# Patient Record
Sex: MALE | Race: WHITE | Employment: OTHER | URBAN - METROPOLITAN AREA
[De-identification: names, ages, dates, MRNs, and addresses within clinical notes are randomized per-mention and may not be internally consistent; named-entity substitution may affect disease eponyms.]

---

## 2017-06-23 ENCOUNTER — HOSPITAL ENCOUNTER (EMERGENCY)
Facility: CLINIC | Age: 77
Discharge: HOME | End: 2017-06-24
Attending: EMERGENCY MEDICINE | Admitting: EMERGENCY MEDICINE
Payer: MEDICARE

## 2017-06-23 DIAGNOSIS — R33.9 URINARY RETENTION: ICD-10-CM

## 2017-06-23 LAB
ALBUMIN UR-MCNC: 30 MG/DL
APPEARANCE UR: ABNORMAL
BACTERIA #/AREA URNS HPF: ABNORMAL /HPF
BILIRUB UR QL STRIP: NEGATIVE
COLOR UR AUTO: YELLOW
GLUCOSE UR STRIP-MCNC: NEGATIVE MG/DL
HGB UR QL STRIP: ABNORMAL
HYALINE CASTS #/AREA URNS LPF: 1 /LPF (ref 0–2)
KETONES UR STRIP-MCNC: NEGATIVE MG/DL
LEUKOCYTE ESTERASE UR QL STRIP: ABNORMAL
MUCOUS THREADS #/AREA URNS LPF: PRESENT /LPF
NITRATE UR QL: POSITIVE
PH UR STRIP: 5 PH (ref 5–7)
RBC #/AREA URNS AUTO: >182 /HPF (ref 0–2)
SP GR UR STRIP: 1.01 (ref 1–1.03)
URN SPEC COLLECT METH UR: ABNORMAL
UROBILINOGEN UR STRIP-MCNC: NORMAL MG/DL (ref 0–2)
WBC #/AREA URNS AUTO: 32 /HPF (ref 0–2)

## 2017-06-23 PROCEDURE — 87086 URINE CULTURE/COLONY COUNT: CPT | Performed by: EMERGENCY MEDICINE

## 2017-06-23 PROCEDURE — 81001 URINALYSIS AUTO W/SCOPE: CPT | Performed by: EMERGENCY MEDICINE

## 2017-06-23 PROCEDURE — 99283 EMERGENCY DEPT VISIT LOW MDM: CPT | Mod: 25

## 2017-06-23 PROCEDURE — 87088 URINE BACTERIA CULTURE: CPT | Performed by: EMERGENCY MEDICINE

## 2017-06-23 PROCEDURE — 51702 INSERT TEMP BLADDER CATH: CPT

## 2017-06-23 PROCEDURE — 87186 SC STD MICRODIL/AGAR DIL: CPT | Performed by: EMERGENCY MEDICINE

## 2017-06-23 NOTE — ED AVS SNAPSHOT
Emergency Department    [address] [facility] [address]    Phone:  [phone]    Fax:  [phone]                                       Chavez Mccain   MRN: [id number]    Department:   Emergency Department   Date of Visit:  6/23/2017           After Visit Summary Signature Page     I have received my discharge instructions, and my questions have been answered. I have discussed any challenges I see with this plan with the nurse or doctor.    ..........................................................................................................................................  Patient/Patient Representative Signature      ..........................................................................................................................................  Patient Representative Print Name and Relationship to Patient    ..................................................               ................................................  Date                                            Time    ..........................................................................................................................................  Reviewed by Signature/Title    ...................................................              ..............................................  Date                                                            Time

## 2017-06-23 NOTE — ED AVS SNAPSHOT
Emergency Department    [address] [facility] [address]    Phone:  [phone]    Fax:  [phone]                                       Chavez Mccain   MRN: [id number]    Department:   Emergency Department   Date of Visit:  6/23/2017           Patient Information     Date Of Birth          1940        Your diagnoses for this visit were:     Urinary retention        You were seen by Carmine Tsai MD.      Follow-up Information     Follow up with Vasile Brooks MD. Schedule an appointment as soon as possible for a visit in 1 week.    Specialty:  Internal Medicine    Contact information:    [facility]  [address] [place] [address] [phone]          Follow up with  Emergency Department.    Specialty:  EMERGENCY MEDICINE    Why:  If symptoms worsen    Contact information:    [address] [facility] [id number] [phone]        Discharge Instructions         Sequeira Catheter Care    A Sequeira catheter is a rubber tube that is placed through the urethra (opening where urine comes out) and into the bladder. This helps drain urine from the bladder. There is a small balloon on the end of the tube that is inflated after insertion. This keeps the catheter from sliding out of the bladder.  A Sequeira catheter is used to treat urinary retention (unable to pass urine). It is also used when there is incontinence (loss of bladder control).  Home care    Finish taking any prescribed antibiotic even if you are feeling better before then.    It is important to keep bacteria from getting into the collection bag. Do not disconnect the catheter from the collection bag.    Use a leg band to secure the drainage tube, so it does not pull on the catheter. Drain the collection bag when it becomes full using the drain spout at the bottom of the bag.    Do not try to pull or remove your catheter. This will injure your urethra. It must be removed by  your healthcare provider or nurse.  Follow-up care  Follow up with your healthcare provider as advised for repeat urine testing and catheter removal or replacement.  When to seek medical advice  Call your healthcare provider right away if any of these occur:    Fever of 100.4 F (38 C) or higher, or as directed by your healthcare provider    Bladder pain or fullness    Abdominal swelling, nausea or vomiting, or back pain    Blood or urine leakage around the catheter    Bloody urine coming from the catheter (if a new symptom)    Catheter falls out    Catheter stops draining for 6 hours    Weakness, dizziness, or fainting  Date Last Reviewed: 10/1/2016    8056-4698 [employer]. [address] [place] [address]. All rights reserved. This information is not intended as a substitute for professional medical care. Always follow your healthcare professional's instructions.          Urinary Retention (Male)  Urinary retention is the medical term for difficulty or inability to pass urine, even though your bladder is full.  Causes  The most common cause of urinary retention in men is the bladder outlet being blocked. This can be due to an enlarged prostate gland or a bladder infection. Certain medicines can also cause this problem. This condition is more likely to occur as men get older.    This condition is treated by insertion of a catheter into the bladder to drain the urine. This provides immediate relief. The catheter may need to remain in place for a few days to prevent a recurrence. The catheter has a balloon on the tip which was inflated after insertion. This prevents the catheter from falling out.  Symptoms  Common symptoms of urinary retention include:    Pain (not experienced by everyone)    Frequent urination    Feeling that the bladder is still full after urinating    Incontinence (not being able to control the release of urine)    Swollen abdomen  Treatment  This condition is treated by  inserting a tube (catheter) into the bladder to drain the urine. This provides immediate relief. The catheter may need to stay in place for a few days. The catheter has a balloon on the tip, which is inflated after insertion. This prevents the catheter from falling out.  Home care    If you were given antibiotics, take them until they are used up, or your healthcare provider tells you to stop. It is important to finish the antibiotics even though you feel better. This is to make sure your infection has cleared.    If a catheter was left in place, it is important to keep bacteria from getting into the collection bag. Do not disconnect the catheter from the collection bag.    Use a leg band to secure the drainage tube, so it does not pull on the catheter. Drain the collection bag when it becomes full using the drain spout at the bottom of the bag.    Do not pull on or try to remove your catheter. This will injure your urethra. The catheter must be removed by a healthcare provider.  Follow-up care  Follow up with your healthcare provider, or as advised.  If a catheter was left in place, it can usually be removed within 3 to 7 days. Some conditions require the catheter to stay in longer. Your healthcare provider will tell you when to return to have the catheter removed.  When to seek medical advice  Call your healthcare provider right away if any of these occur:    Fever of 100.4 F (38 C) or higher, or as directed by your healthcare provider    Bladder or lower-abdominal pain or fullness    Abdominal swelling, nausea, vomiting, or back pain    Blood or urine leakage around the catheter    Bloody urine coming from the catheter (if a new symptom)    Weakness, dizziness, or fainting    Confusion or change in usual level of alertness    If a catheter was left in place, return if:    Catheter falls out    Catheter stops draining for 6 hours  Date Last Reviewed: 7/26/2015 2000-2017 [employer]. [address] [place] [address]  [place] [address]. All rights reserved. This information is not intended as a substitute for professional medical care. Always follow your healthcare professional's instructions.          24 Hour Appointment Hotline       To make an appointment at any [facility], call [phone] ([phone]). If you don't have a family doctor or clinic, we will help you find one. [facility] clinics are conveniently located to serve the needs of you and your family.             Review of your medicines      Our records show that you are taking the medicines listed below. If these are incorrect, please call your family doctor or clinic.        Dose / Directions Last dose taken    alfuzosin 10 MG 24 hr tablet   Commonly known as:  UROXATRAL   Dose:  10 mg        Take 10 mg by mouth daily.   Refills:  0        aspirin 81 MG EC tablet   Dose:  81 mg        Take 81 mg by mouth every evening   Refills:  0        atorvastatin 10 MG tablet   Commonly known as:  LIPITOR   Dose:  10 mg        Take 10 mg by mouth At Bedtime.   Refills:  0        cholecalciferol 1000 UNIT tablet   Commonly known as:  vitamin D   Dose:  1000 Units        Take 1,000 Units by mouth every evening   Refills:  0        cyanocobalamin 1000 MCG Tabs   Dose:  1000 mcg        Take 1,000 mcg by mouth every evening   Refills:  0        DONEPEZIL HCL PO   Dose:  10 mg        Take 10 mg by mouth daily   Refills:  0        GLIPIZIDE XL PO   Dose:  10 mg        Take 10 mg by mouth daily (with breakfast)   Refills:  0        insulin aspart 100 UNITS/ML injection   Commonly known as:  NovoLOG   Quantity:  10 mL        Give before meals and before bed: For Pre-Meal Glucose: 140-189 give 1 unit  190-239 give 2 units  240-289 give 3 units  290-339 give 4 units  = or >340 give 5 units   For Bedtime Glucose 200 - 239 give 1 units  240 - 289 give 1.5 units 290 - 339 give 2 units = or >340 give 2.5 units   Refills:  0        irbesartan 300 MG tablet   Commonly  known as:  AVAPRO   Dose:  300 mg        Take 300 mg by mouth daily.   Refills:  0        loperamide 2 MG capsule   Commonly known as:  IMODIUM   Dose:  2 mg        Take 2 mg by mouth 2 times daily   Refills:  0        metolazone 2.5 MG tablet   Commonly known as:  ZAROXOLYN   Dose:  2.5 mg        Take 2.5 mg by mouth daily.   Refills:  0        potassium chloride 10 MEQ CR tablet   Commonly known as:  KLOR-CON   Dose:  10 mEq        Take 10 mEq by mouth 4 times daily   Refills:  0        salsalate 750 MG tablet   Commonly known as:  DISALCID   Dose:  750 mg        Take 750 mg by mouth 3 times daily.   Refills:  0        traZODone 100 MG tablet   Commonly known as:  DESYREL   Dose:  100 mg        Take 100 mg by mouth At Bedtime.   Refills:  0        WELLBUTRIN XL PO   Dose:  300 mg        Take 300 mg by mouth daily   Refills:  0                Procedures and tests performed during your visit     UA with Microscopic      Orders Needing Specimen Collection     None      Pending Results     No orders found for last 3 day(s).            Pending Culture Results     No orders found for last 3 day(s).            Pending Results Instructions     If you had any lab results that were not finalized at the time of your Discharge, you can call the ED Lab Result RN at [phone]. You will be contacted by this team for any positive Lab results or changes in treatment. The nurses are available 7 days a week from 10A to 6:30P.  You can leave a message 24 hours per day and they will return your call.        Test Results From Your Hospital Stay        6/23/2017 11:41 PM      Component Results     Component Value Ref Range & Units Status    Color Urine Yellow  Final    Appearance Urine Slightly Cloudy  Final    Glucose Urine Negative NEG mg/dL Final    Bilirubin Urine Negative NEG Final    Ketones Urine Negative NEG mg/dL Final    Specific Gravity Urine 1.011 1.003 - 1.035 Final    Blood Urine Large (A) NEG Final    pH Urine 5.0 5.0  - 7.0 pH Final    Protein Albumin Urine 30 (A) NEG mg/dL Final    Urobilinogen mg/dL Normal 0.0 - 2.0 mg/dL Final    Nitrite Urine Positive (A) NEG Final    Leukocyte Esterase Urine Large (A) NEG Final    Source Midstream Urine  Final    WBC Urine 32 (H) 0 - 2 /HPF Final    RBC Urine >182 (H) 0 - 2 /HPF Final    Bacteria Urine Many (A) NEG /HPF Final    Mucous Urine Present (A) NEG /LPF Final    Hyaline Casts 1 0 - 2 /LPF Final                Clinical Quality Measure: Blood Pressure Screening     Your blood pressure was checked while you were in the emergency department today. The last reading we obtained was  BP: 164/81 . Please read the guidelines below about what these numbers mean and what you should do about them.  If your systolic blood pressure (the top number) is less than 120 and your diastolic blood pressure (the bottom number) is less than 80, then your blood pressure is normal. There is nothing more that you need to do about it.  If your systolic blood pressure (the top number) is 120-139 or your diastolic blood pressure (the bottom number) is 80-89, your blood pressure may be higher than it should be. You should have your blood pressure rechecked within a year by a primary care provider.  If your systolic blood pressure (the top number) is 140 or greater or your diastolic blood pressure (the bottom number) is 90 or greater, you may have high blood pressure. High blood pressure is treatable, but if left untreated over time it can put you at risk for heart attack, stroke, or kidney failure. You should have your blood pressure rechecked by a primary care provider within the next 4 weeks.  If your provider in the emergency department today gave you specific instructions to follow-up with your doctor or provider even sooner than that, you should follow that instruction and not wait for up to 4 weeks for your follow-up visit.        Thank you for choosing [facility]       Thank you for choosing [facility] for  "your care. Our goal is always to provide you with excellent care. Hearing back from our patients is one way we can continue to improve our services. Please take a few minutes to complete the written survey that you may receive in the mail after you visit with us. Thank you!        [employer] Information     [employer] lets you send messages to your doctor, view your test results, renew your prescriptions, schedule appointments and more. To sign up, go to [email or web address] . Click on \"Log in\" on the left side of the screen, which will take you to the Welcome page. Then click on \"Sign up Now\" on the right side of the page.     You will be asked to enter the access code listed below, as well as some personal information. Please follow the directions to create your username and password.     Your access code is: [email or web address]  Expires: 2017 12:17 AM     Your access code will  in 90 days. If you need help or a new code, please call your [facility] clinic or [phone].        Care EveryWhere ID     This is your Care EveryWhere ID. This could be used by other organizations to access your [facility] medical records  [id number]        Equal Access to Services     CINDY DURAN AH: Bulmaro Velasco, beth duarte, david solano, elio guerrero. So [facility] [phone].    ATENCIÓN: Si habla español, tiene a cool disposición servicios gratuitos de asistencia lingüística. Llame al [phone].    We comply with applicable federal civil rights laws and [place] laws. We do not discriminate on the basis of race, color, national origin, age, disability sex, sexual orientation or gender identity.            After Visit Summary       This is your record. Keep this with you and show to your community pharmacist(s) and doctor(s) at your next visit.                  "

## 2017-06-24 VITALS
BODY MASS INDEX: 40.18 KG/M2 | HEIGHT: 66 IN | WEIGHT: 250 LBS | RESPIRATION RATE: 18 BRPM | HEART RATE: 101 BPM | DIASTOLIC BLOOD PRESSURE: 75 MMHG | TEMPERATURE: 97.9 F | OXYGEN SATURATION: 98 % | SYSTOLIC BLOOD PRESSURE: 148 MMHG

## 2017-06-24 ASSESSMENT — ENCOUNTER SYMPTOMS
ABDOMINAL PAIN: 0
DIFFICULTY URINATING: 0
DYSURIA: 0

## 2017-06-24 NOTE — DISCHARGE INSTRUCTIONS
Sequeira Catheter Care    A Sequeira catheter is a rubber tube that is placed through the urethra (opening where urine comes out) and into the bladder. This helps drain urine from the bladder. There is a small balloon on the end of the tube that is inflated after insertion. This keeps the catheter from sliding out of the bladder.  A Sequeira catheter is used to treat urinary retention (unable to pass urine). It is also used when there is incontinence (loss of bladder control).  Home care    Finish taking any prescribed antibiotic even if you are feeling better before then.    It is important to keep bacteria from getting into the collection bag. Do not disconnect the catheter from the collection bag.    Use a leg band to secure the drainage tube, so it does not pull on the catheter. Drain the collection bag when it becomes full using the drain spout at the bottom of the bag.    Do not try to pull or remove your catheter. This will injure your urethra. It must be removed by your healthcare provider or nurse.  Follow-up care  Follow up with your healthcare provider as advised for repeat urine testing and catheter removal or replacement.  When to seek medical advice  Call your healthcare provider right away if any of these occur:    Fever of 100.4 F (38 C) or higher, or as directed by your healthcare provider    Bladder pain or fullness    Abdominal swelling, nausea or vomiting, or back pain    Blood or urine leakage around the catheter    Bloody urine coming from the catheter (if a new symptom)    Catheter falls out    Catheter stops draining for 6 hours    Weakness, dizziness, or fainting  Date Last Reviewed: 10/1/2016    0746-6789 [employer]. [address] [place] [address], [place] [address]. All rights reserved. This information is not intended as a substitute for professional medical care. Always follow your healthcare professional's instructions.          Urinary Retention (Male)  Urinary retention is the medical  term for difficulty or inability to pass urine, even though your bladder is full.  Causes  The most common cause of urinary retention in men is the bladder outlet being blocked. This can be due to an enlarged prostate gland or a bladder infection. Certain medicines can also cause this problem. This condition is more likely to occur as men get older.    This condition is treated by insertion of a catheter into the bladder to drain the urine. This provides immediate relief. The catheter may need to remain in place for a few days to prevent a recurrence. The catheter has a balloon on the tip which was inflated after insertion. This prevents the catheter from falling out.  Symptoms  Common symptoms of urinary retention include:    Pain (not experienced by everyone)    Frequent urination    Feeling that the bladder is still full after urinating    Incontinence (not being able to control the release of urine)    Swollen abdomen  Treatment  This condition is treated by inserting a tube (catheter) into the bladder to drain the urine. This provides immediate relief. The catheter may need to stay in place for a few days. The catheter has a balloon on the tip, which is inflated after insertion. This prevents the catheter from falling out.  Home care    If you were given antibiotics, take them until they are used up, or your healthcare provider tells you to stop. It is important to finish the antibiotics even though you feel better. This is to make sure your infection has cleared.    If a catheter was left in place, it is important to keep bacteria from getting into the collection bag. Do not disconnect the catheter from the collection bag.    Use a leg band to secure the drainage tube, so it does not pull on the catheter. Drain the collection bag when it becomes full using the drain spout at the bottom of the bag.    Do not pull on or try to remove your catheter. This will injure your urethra. The catheter must be removed by a  healthcare provider.  Follow-up care  Follow up with your healthcare provider, or as advised.  If a catheter was left in place, it can usually be removed within 3 to 7 days. Some conditions require the catheter to stay in longer. Your healthcare provider will tell you when to return to have the catheter removed.  When to seek medical advice  Call your healthcare provider right away if any of these occur:    Fever of 100.4 F (38 C) or higher, or as directed by your healthcare provider    Bladder or lower-abdominal pain or fullness    Abdominal swelling, nausea, vomiting, or back pain    Blood or urine leakage around the catheter    Bloody urine coming from the catheter (if a new symptom)    Weakness, dizziness, or fainting    Confusion or change in usual level of alertness    If a catheter was left in place, return if:    Catheter falls out    Catheter stops draining for 6 hours  Date Last Reviewed: 7/26/2015 2000-2017 [employer]. [address] [place] [address], [place] [address]. All rights reserved. This information is not intended as a substitute for professional medical care. Always follow your healthcare professional's instructions.

## 2017-06-24 NOTE — ED PROVIDER NOTES
"  History     Chief Complaint:  Catheter Problem    HPI   Chavez Mccain is a 76 year old male with a history of UTI's and Chronic Kidney Disease who presents to the emergency department today for evaluation of a catheter problem. The patient has an indwelling catheter and today when an in home nurse was going to change it she saw blood clots so did not replace it at this time. The patient had his catheter replaced here in the ED and states that he is feeling much better. The patient states that he has had several visits to the ED for problems with his Catheter. Additionally he is on a low dose Antibiotic to prevent infection. The patient denies any urinary symptoms, abdominal pain, or flank pain.     Allergies:  No Known Drug Allergies     Medications:    Novolog  Donepezil  Imodium  Wellbutrin  Glipizide  Asprin  Cyanocobalamin  Alfuzosin  Lipitor  Avapro  Metolazone  Potassium Chloride  Disalcid  Desyrel     Past Medical History:    Chronic Kidney Disease  Diabetes Mellitus    Past Surgical History:    Back Surgery  ENT Surgery  Hemorrhoid   Knee Surgery  Orthopedic Surgery     Family History:    History reviewed.  No significant family history.     Social History:  The patient was accompanied to the ED by his wife.  Smoking Status: former smoker  Alcohol Use: positive    Marital Status:   [2]     Review of Systems   Gastrointestinal: Negative for abdominal pain.   Genitourinary: Negative for difficulty urinating and dysuria.   All other systems reviewed and are negative.      Physical Exam   First Vitals:  BP: 164/81  Pulse: 101  Temp: 97.9  F (36.6  C)  Resp: 18  Height: 167.6 cm (5' 6\")  Weight: 113.4 kg (250 lb)  SpO2: 95 %    Physical Exam  Constitutional:  Alert, answers questions appropriately.   Neck:    Normal range of motion.   HEENT:  Pupils round, equal  Pulmonary/Chest:  Effort normal.   Abdomen:   No CVA tenderness. Protuberant abdomen. Non tender. No masses.  Neurological:   No facial " asymmetry, gait intact. Trace edema both legs.  :   Sexton catheter draining clear yellow blood tinged urine.   Psychiatric:   The patient has a normal mood and affect.     Emergency Department Course   Laboratory:  2341 UA with Microscopic: Protein Albumin 30 (A), Large Blood (A), Positive Nitrite (A), Large Leukocyte Esterase (A), WBC/HPF 23 (H) RBC/HPF > 182, Many Bacteria (A), Mucous Present (A)    Emergency Department Course:  The patient had a sexton catheter placed by nursing.     The patient provided a urine sample here in the emergency department. This was sent for laboratory testing, findings above.     Nursing notes and vitals reviewed. I performed an exam of the patient as documented above.     Findings and plan explained to the Patient. Patient discharged home with instructions regarding supportive care, medications, and reasons to return. The importance of close follow-up was reviewed.      Impression & Plan    Medical Decision Making:  Chavez Mccain is a 76 year old male who presents complaining of no urine output since 5:00 pm. He had a sexton catheter changed today, but because of some blood clots seen after the catheter was removed his nurse did not replace his catheter.     Here in the ED the catheter was replaced by nursing without difficulty. There is blood tinged urine that is positive for WBC and bacteria. The patient is denying any UTI symptoms. He reports that he is chronically on antibiotics to suppress UTI. I sent a culture but will not treat for UTI at this time.    The patient was observed in the ED and tolerated a PO challenge and had continuing urine output from the cathter. He will be discharged home with general sexton catheter care instructions and he will follow up with urology. The patient left in good condition.     Diagnosis:    ICD-10-CM    1. Urinary retention R33.9        Disposition:  Discharged to home.     Trudy MOSES, am serving as a scribe on 6/24/2017 at  12:07 AM to personally document services performed by Carmine Tsai MD based on my observations and the provider's statements to me.     Trudy Canela  6/23/2017    EMERGENCY DEPARTMENT       Carmine Tsai MD  06/25/17 0801

## 2017-06-26 ENCOUNTER — TELEPHONE (OUTPATIENT)
Dept: EMERGENCY MEDICINE | Facility: CLINIC | Age: 77
End: 2017-06-26

## 2017-06-26 LAB
BACTERIA SPEC CULT: ABNORMAL
Lab: ABNORMAL
MICRO REPORT STATUS: ABNORMAL
MICROORGANISM SPEC CULT: ABNORMAL
SPECIMEN SOURCE: ABNORMAL

## 2017-06-26 NOTE — TELEPHONE ENCOUNTER
[facility] Emergency Department Lab result notification [Adult-Male]    [facility] ED lab result protocol used  Urine Culture Protcol    Reason for call  Notify of lab results, assess symptoms,  review ED providers recommendations/discharge instructions (if necessary) and advise per ED lab result f/u protocol    Lab Result (including Rx patient on, if applicable)  Final Urine Culture Report on 06/26/2017  [place] ED discharge antibiotic: None, on Keflex 250 mg po daily as infection prophylaxis  #1. Bacteria, 50,000 to 100,000 colonies/mL Citrobacter freundii complex,  Is RESISTANT to Prophylacitic antibiotic.   Change in treatment as per [facility] ED Lab result protocol.    Information table from ED Provider visit on 06/23/2017  ED diagnosis: Urinary retention   ED provider Carmine Tsai MD   Symptoms reported at ED visit (Chief complaint, HPI) a 76 year old male with a history of UTI's and Chronic Kidney Disease who presents to the emergency department today for evaluation of a catheter problem. The patient has an indwelling catheter and today when an in home nurse was going to change it she saw blood clots so did not replace it at this time. The patient had his catheter replaced here in the ED and states that he is feeling much better. The patient states that he has had several visits to the ED for problems with his Catheter. Additionally he is on a low dose Antibiotic to prevent infection. The patient denies any urinary symptoms, abdominal pain, or flank pain.    ED providers Impression and Plan (applicable information) a 76 year old male who presents complaining of no urine output since 5:00 pm. He had a sexton catheter changed today, but because of some blood clots seen after the catheter was removed his nurse did not replace his catheter.        Here in the ED the catheter was replaced by nursing without difficulty. There is blood tinged urine that is positive for WBC and bacteria. The patient is  denying any UTI symptoms. He reports that he is chronically on antibiotics to suppress UTI. I sent a culture but will not treat for UTI at this time.        The patient was observed in the ED and tolerated a PO challenge and had continuing urine output from the cathter. He will be discharged home with general sexton catheter care instructions and he will follow up with urology. The patient left in good condition   Significant Medical hx, if applicable Reviewed   Coumadin/Warfarin [Yes or No] no   Creatinine Level (mg/dl) Unknown   Creatinine clearance (ml/min), if applicable Unknown   Allergies NKA   Weight, if applicable 113.4 kg      RN Assessment (Patient s current Symptoms), include time called.  [Insert Left message here if message left]  At 1401 Left voicemail message requesting a call back to [phone] between 10 a.m. and 6:30 p.m., 7 days a week for patient's ED/UC lab results.  May leave a message 24/7, if no one available.       [facility] Department Provider Name & Recommendations (included time consulted)  At 1345 consulted Dr. Rowland ,  ER provider, regarding the keflex being used and if pt should continue it while on bactrim DS and he recommended continue Keflex no need to stop.        Florida Franks, RN  [facility] RN  Lung Nodule and ED Lab Result F/u RN  Epic pool (ED late result f/u RN): [id number]  [facility] INCIDENTAL RADIOLOGY F/U NURSES: [id number]  Ph# [phone]       Copy of Lab result   Exam Information   Exam Date Exam Time Accession # Results    6/23/17 11:29 PM [id number]    Component Results   Component Collected Lab   Specimen Description 06/23/2017 11:29 PM [id number]   Midstream Urine   Special Requests 06/23/2017 11:29 PM 75   Specimen received in preservative   Culture Micro (Abnormal) 06/23/2017 11:29    50,000 to 100,000 colonies/mL Citrobacter freundii complex   Micro Report Status 06/23/2017 11:29    FINAL 06/26/2017   Organism: 06/23/2017 11:29     50,000 to 100,000 colonies/mL Citrobacter freundii complex   Culture & Susceptibility   50,000 ,000 COLONIES/ML CITROBACTER FREUNDII COMPLEX (JYOTSNA)   Antibiotic Sensitivity Unit Status   AMPICILLIN >=32 Resistant ug/mL Final   AMPICILLIN/SULBACTAM >=32 Resistant ug/mL Final   CEFAZOLIN >=64 Resistant  Cefazolin JYOTSNA breakpoints are for the treatment of uncomplicated urinary tract   infections.  For the treatment of systemic infections, please contact the   laboratory for additional testing. ug/mL Final   CEFEPIME <=1 Susceptible ug/mL Final   CEFOXITIN >=64 Resistant ug/mL Final   CEFTAZIDIME >=64 Resistant ug/mL Final   CEFTRIAXONE >=64 Resistant ug/mL Final   CIPROFLOXACIN <=0.25 Susceptible ug/mL Final   GENTAMICIN <=1 Susceptible ug/mL Final   LEVOFLOXACIN <=0.12 Susceptible ug/mL Final   NITROFURANTOIN <=16 Susceptible ug/mL Final   Piperacillin/Tazo 64 Intermediate ug/mL Final   TOBRAMYCIN <=1 Susceptible ug/mL Final   Trimethoprim/Sulfa <=1/19 Susceptible ug/mL Final

## 2017-06-27 RX ORDER — CIPROFLOXACIN 500 MG/1
500 TABLET, FILM COATED ORAL 2 TIMES DAILY
Qty: 14 TABLET | Refills: 0 | Status: SHIPPED | OUTPATIENT
Start: 2017-06-27 | End: 2017-07-04

## 2017-06-27 NOTE — TELEPHONE ENCOUNTER
Pt has no catheter issues to note at this time and catheter is working properly.     Previous note mentions Bactrim DS however pt was to be started on Cipro.  Rx for Cipro sent to pharmacy.      Follow up discussed.    Florida Franks, RN  [facility] RN  Lung Nodule and ED Lab Result F/u RN  Epic pool (ED late result f/u RN): [id number]  [facility] INCIDENTAL RADIOLOGY F/U NURSES: P [id number]  Ph# [phone]

## 2017-08-23 ENCOUNTER — HOSPITAL ENCOUNTER (OUTPATIENT)
Facility: CLINIC | Age: 77
Setting detail: OBSERVATION
Discharge: NURSING, REHAB OR LONG-TERM CARE | End: 2017-08-24
Attending: HOSPITALIST | Admitting: HOSPITALIST
Payer: MEDICARE

## 2017-08-23 DIAGNOSIS — E11.9 TYPE 2 DIABETES MELLITUS WITHOUT COMPLICATION, WITHOUT LONG-TERM CURRENT USE OF INSULIN (H): Primary | ICD-10-CM

## 2017-08-23 PROBLEM — R29.6 FREQUENT FALLS: Status: ACTIVE | Noted: 2017-08-23

## 2017-08-23 LAB
ANION GAP SERPL CALCULATED.3IONS-SCNC: 9 MMOL/L (ref 3–14)
BUN SERPL-MCNC: 16 MG/DL (ref 7–30)
CALCIUM SERPL-MCNC: 8.9 MG/DL (ref 8.5–10.1)
CHLORIDE SERPL-SCNC: 98 MMOL/L (ref 94–109)
CO2 SERPL-SCNC: 28 MMOL/L (ref 20–32)
CREAT SERPL-MCNC: 0.91 MG/DL (ref 0.66–1.25)
GFR SERPL CREATININE-BSD FRML MDRD: 81 ML/MIN/1.7M2
GLUCOSE BLDC GLUCOMTR-MCNC: 333 MG/DL (ref 70–99)
GLUCOSE SERPL-MCNC: 258 MG/DL (ref 70–99)
MAGNESIUM SERPL-MCNC: 1.9 MG/DL (ref 1.6–2.3)
POTASSIUM SERPL-SCNC: 2.9 MMOL/L (ref 3.4–5.3)
SODIUM SERPL-SCNC: 135 MMOL/L (ref 133–144)

## 2017-08-23 PROCEDURE — 00000146 ZZHCL STATISTIC GLUCOSE BY METER IP

## 2017-08-23 PROCEDURE — 96372 THER/PROPH/DIAG INJ SC/IM: CPT

## 2017-08-23 PROCEDURE — 80048 BASIC METABOLIC PNL TOTAL CA: CPT | Performed by: PHYSICIAN ASSISTANT

## 2017-08-23 PROCEDURE — G0378 HOSPITAL OBSERVATION PER HR: HCPCS

## 2017-08-23 PROCEDURE — 83735 ASSAY OF MAGNESIUM: CPT | Performed by: PHYSICIAN ASSISTANT

## 2017-08-23 PROCEDURE — 25000132 ZZH RX MED GY IP 250 OP 250 PS 637: Mod: GY | Performed by: PHYSICIAN ASSISTANT

## 2017-08-23 PROCEDURE — 36415 COLL VENOUS BLD VENIPUNCTURE: CPT | Performed by: PHYSICIAN ASSISTANT

## 2017-08-23 PROCEDURE — A9270 NON-COVERED ITEM OR SERVICE: HCPCS | Mod: GY | Performed by: PHYSICIAN ASSISTANT

## 2017-08-23 RX ORDER — DONEPEZIL HYDROCHLORIDE 10 MG/1
10 TABLET, FILM COATED ORAL DAILY
Status: DISCONTINUED | OUTPATIENT
Start: 2017-08-24 | End: 2017-08-24 | Stop reason: HOSPADM

## 2017-08-23 RX ORDER — MICONAZOLE NITRATE 20 MG/G
CREAM TOPICAL 2 TIMES DAILY
COMMUNITY

## 2017-08-23 RX ORDER — NICOTINE POLACRILEX 4 MG
15-30 LOZENGE BUCCAL
Status: DISCONTINUED | OUTPATIENT
Start: 2017-08-23 | End: 2017-08-24 | Stop reason: HOSPADM

## 2017-08-23 RX ORDER — NALOXONE HYDROCHLORIDE 0.4 MG/ML
.1-.4 INJECTION, SOLUTION INTRAMUSCULAR; INTRAVENOUS; SUBCUTANEOUS
Status: DISCONTINUED | OUTPATIENT
Start: 2017-08-23 | End: 2017-08-24 | Stop reason: HOSPADM

## 2017-08-23 RX ORDER — ONDANSETRON 2 MG/ML
4 INJECTION INTRAMUSCULAR; INTRAVENOUS EVERY 6 HOURS PRN
Status: DISCONTINUED | OUTPATIENT
Start: 2017-08-23 | End: 2017-08-24 | Stop reason: HOSPADM

## 2017-08-23 RX ORDER — NYSTATIN 100000 [USP'U]/G
1 POWDER TOPICAL 2 TIMES DAILY PRN
Status: ON HOLD | COMMUNITY
End: 2022-01-17

## 2017-08-23 RX ORDER — POTASSIUM CHLORIDE 1.5 G/1.58G
20-40 POWDER, FOR SOLUTION ORAL
Status: DISCONTINUED | OUTPATIENT
Start: 2017-08-23 | End: 2017-08-24 | Stop reason: HOSPADM

## 2017-08-23 RX ORDER — POTASSIUM CHLORIDE 29.8 MG/ML
20 INJECTION INTRAVENOUS
Status: DISCONTINUED | OUTPATIENT
Start: 2017-08-23 | End: 2017-08-24 | Stop reason: HOSPADM

## 2017-08-23 RX ORDER — PROCHLORPERAZINE MALEATE 5 MG
5 TABLET ORAL EVERY 6 HOURS PRN
Status: DISCONTINUED | OUTPATIENT
Start: 2017-08-23 | End: 2017-08-24 | Stop reason: HOSPADM

## 2017-08-23 RX ORDER — POLYETHYLENE GLYCOL 3350 17 G/17G
17 POWDER, FOR SOLUTION ORAL DAILY PRN
Status: DISCONTINUED | OUTPATIENT
Start: 2017-08-23 | End: 2017-08-24 | Stop reason: HOSPADM

## 2017-08-23 RX ORDER — AMOXICILLIN 250 MG
1-2 CAPSULE ORAL 2 TIMES DAILY PRN
Status: DISCONTINUED | OUTPATIENT
Start: 2017-08-23 | End: 2017-08-24 | Stop reason: HOSPADM

## 2017-08-23 RX ORDER — CITALOPRAM HYDROBROMIDE 20 MG/1
20 TABLET ORAL DAILY
Status: DISCONTINUED | OUTPATIENT
Start: 2017-08-24 | End: 2017-08-24 | Stop reason: HOSPADM

## 2017-08-23 RX ORDER — GLIPIZIDE 5 MG/1
5 TABLET, FILM COATED, EXTENDED RELEASE ORAL
Status: DISCONTINUED | OUTPATIENT
Start: 2017-08-24 | End: 2017-08-24

## 2017-08-23 RX ORDER — PROCHLORPERAZINE 25 MG
12.5 SUPPOSITORY, RECTAL RECTAL EVERY 12 HOURS PRN
Status: DISCONTINUED | OUTPATIENT
Start: 2017-08-23 | End: 2017-08-24 | Stop reason: HOSPADM

## 2017-08-23 RX ORDER — ACETAMINOPHEN 650 MG/1
650 SUPPOSITORY RECTAL EVERY 4 HOURS PRN
Status: DISCONTINUED | OUTPATIENT
Start: 2017-08-23 | End: 2017-08-24 | Stop reason: HOSPADM

## 2017-08-23 RX ORDER — IRBESARTAN 150 MG/1
300 TABLET ORAL DAILY
Status: DISCONTINUED | OUTPATIENT
Start: 2017-08-24 | End: 2017-08-24 | Stop reason: HOSPADM

## 2017-08-23 RX ORDER — TRAZODONE HYDROCHLORIDE 50 MG/1
100 TABLET, FILM COATED ORAL
Status: DISCONTINUED | OUTPATIENT
Start: 2017-08-23 | End: 2017-08-24 | Stop reason: HOSPADM

## 2017-08-23 RX ORDER — CITALOPRAM HYDROBROMIDE 20 MG/1
20 TABLET ORAL DAILY
Status: ON HOLD | COMMUNITY
End: 2022-01-17

## 2017-08-23 RX ORDER — ONDANSETRON 4 MG/1
4 TABLET, ORALLY DISINTEGRATING ORAL EVERY 6 HOURS PRN
Status: DISCONTINUED | OUTPATIENT
Start: 2017-08-23 | End: 2017-08-24 | Stop reason: HOSPADM

## 2017-08-23 RX ORDER — FINASTERIDE 5 MG/1
5 TABLET, FILM COATED ORAL DAILY
Status: DISCONTINUED | OUTPATIENT
Start: 2017-08-24 | End: 2017-08-24 | Stop reason: HOSPADM

## 2017-08-23 RX ORDER — POTASSIUM CL/LIDO/0.9 % NACL 10MEQ/0.1L
10 INTRAVENOUS SOLUTION, PIGGYBACK (ML) INTRAVENOUS
Status: DISCONTINUED | OUTPATIENT
Start: 2017-08-23 | End: 2017-08-24 | Stop reason: HOSPADM

## 2017-08-23 RX ORDER — LIDOCAINE 40 MG/G
CREAM TOPICAL
Status: DISCONTINUED | OUTPATIENT
Start: 2017-08-23 | End: 2017-08-24 | Stop reason: HOSPADM

## 2017-08-23 RX ORDER — DEXTROSE MONOHYDRATE 25 G/50ML
25-50 INJECTION, SOLUTION INTRAVENOUS
Status: DISCONTINUED | OUTPATIENT
Start: 2017-08-23 | End: 2017-08-24 | Stop reason: HOSPADM

## 2017-08-23 RX ORDER — POTASSIUM CHLORIDE 750 MG/1
10 TABLET, EXTENDED RELEASE ORAL 3 TIMES DAILY
Status: DISCONTINUED | OUTPATIENT
Start: 2017-08-23 | End: 2017-08-24

## 2017-08-23 RX ORDER — POTASSIUM CHLORIDE 7.45 MG/ML
10 INJECTION INTRAVENOUS
Status: DISCONTINUED | OUTPATIENT
Start: 2017-08-23 | End: 2017-08-24 | Stop reason: HOSPADM

## 2017-08-23 RX ORDER — ACETAMINOPHEN 325 MG/1
650 TABLET ORAL EVERY 4 HOURS PRN
Status: DISCONTINUED | OUTPATIENT
Start: 2017-08-23 | End: 2017-08-24 | Stop reason: HOSPADM

## 2017-08-23 RX ORDER — POTASSIUM CHLORIDE 1500 MG/1
20-40 TABLET, EXTENDED RELEASE ORAL
Status: DISCONTINUED | OUTPATIENT
Start: 2017-08-23 | End: 2017-08-24 | Stop reason: HOSPADM

## 2017-08-23 RX ADMIN — POTASSIUM CHLORIDE 40 MEQ: 1500 TABLET, EXTENDED RELEASE ORAL at 22:08

## 2017-08-23 RX ADMIN — POTASSIUM CHLORIDE 10 MEQ: 750 TABLET, EXTENDED RELEASE ORAL at 22:07

## 2017-08-23 NOTE — IP AVS SNAPSHOT
` `     [place] [address] [facility]: [phone]            Medication Administration Report for Chavez Mccain as of 08/24/17 9718   Legend:    Given Hold Not Given Due Canceled Entry Other Actions    Time Time (Time) Time  Time-Action       Inactive    Active    Linked        Medications 08/18/17 08/19/17 08/20/17 08/21/17 08/22/17 08/23/17 08/24/17    acetaminophen (TYLENOL) Suppository 650 mg  Dose: 650 mg Freq: EVERY 4 HOURS PRN Route: RE  PRN Reason: mild pain  Start: 08/23/17 1832   Admin Instructions: Alternate ibuprofen (if ordered) with acetaminophen.  Maximum acetaminophen dose from all sources = 75 mg/kg/day not to exceed 4 grams/day.               acetaminophen (TYLENOL) tablet 650 mg  Dose: 650 mg Freq: EVERY 4 HOURS PRN Route: PO  PRN Reason: mild pain  Start: 08/23/17 1832   Admin Instructions: Alternate ibuprofen (if ordered) with acetaminophen.  Maximum acetaminophen dose from all sources = 75 mg/kg/day not to exceed 4 grams/day.               cephalexin (KEFLEX) capsule 250 mg  Dose: 250 mg Freq: DAILY Route: PO  Indications Comment: UTI Prophylaxis  Start: 08/24/17 0900          0916 (250 mg)-Given           citalopram (celeXA) tablet 20 mg  Dose: 20 mg Freq: DAILY Route: PO  Start: 08/24/17 0900          0916 (20 mg)-Given           donepezil (ARICEPT) tablet 10 mg  Dose: 10 mg Freq: DAILY Route: PO  Start: 08/24/17 0900          0916 (10 mg)-Given           finasteride (PROSCAR) tablet 5 mg  Dose: 5 mg Freq: DAILY Route: PO  Start: 08/24/17 0900   Admin Instructions: *Do not handle tablets if you are pregnant*           0916 (5 mg)-Given           glucose 40 % gel 15-30 g  Dose: 15-30 g Freq: EVERY 15 MIN PRN Route: PO  PRN Reason: low blood sugar  Start: 08/23/17 1832   Admin Instructions: Give 15 g for BG 51 to 69 mg/dL IF patient is conscious and able to swallow. Give 30 g for BG less than or equal to 50 mg/dL IF patient is conscious and able to swallow. Do NOT give  glucose gel via enteral tube.  IF patient has enteral tube: give apple juice 120 mL (4 oz or 15 g of CHO) via enteral tube for BG 51 to 69 mg/dL.  Give apple juice 240 mL (8 oz or 30 g of CHO) via enteral tube for BG less than or equal to 50 mg/dL.    ~Oral gel is preferable for conscious and able to swallow patient.   ~IF gel unavailable or patient refuses may provide apple juice 120 mL (4 oz or 15 g of CHO). Document juice on I and O flowsheet.              Or  dextrose 50 % injection 25-50 mL  Dose: 25-50 mL Freq: EVERY 15 MIN PRN Route: IV  PRN Reason: low blood sugar  Start: 08/23/17 1832   Admin Instructions: Use if have IV access, BG less than 70 mg/dL and meet dose criteria below:  Dose if conscious and alert (or disorientated) and NPO = 25 mL  Dose if unconscious / not alert = 50 mL  Vesicant.              Or  glucagon injection 1 mg  Dose: 1 mg Freq: EVERY 15 MIN PRN Route: SC  PRN Reason: low blood sugar  PRN Comment: May repeat x 1 only  Start: 08/23/17 1832   Admin Instructions: May give SQ or IM. ONLY use glucagon IF patient has NO IV access AND is UNABLE to swallow AND blood glucose is LESS than or EQUAL to 50 mg/dL.               insulin aspart (NovoLOG) inj (RAPID ACTING)  Dose: 1-10 Units Freq: 3 TIMES DAILY BEFORE MEALS Route: SC  Start: 08/24/17 1215   Admin Instructions: Correction Scale - HIGH INSULIN RESISTANCE DOSING     Do Not give Correction Insulin if Pre-Meal BG less than 140.   For Pre-Meal  - 164 give 1 unit.   For Pre-Meal  - 189 give 2 units.   For Pre-Meal  - 214 give 3 units.   For Pre-Meal  - 239 give 4 units.   For Pre-Meal  - 264 give 5 units.   For Pre-Meal  - 289 give 6 units.   For Pre-Meal  - 314 give 7 units.   For Pre-Meal  - 339 give 8 units.   For Pre-Meal  - 364 give 9 units.   For Pre-Meal BG greater than or equal to 365 give 10 units  To be given with prandial insulin, and based on pre-meal blood glucose.   Notify  "provider if glucose greater than or equal to 350 mg/dL after administration of correction dose.  If given at mealtime, must be administered 5 min before meal or immediately after.           1223 (8 Units)-Given       [ ] 1700           irbesartan (AVAPRO) tablet 300 mg  Dose: 300 mg Freq: DAILY Route: PO  Start: 08/24/17 0900          0916 (300 mg)-Given           lidocaine (LMX4) cream  Freq: EVERY 1 HOUR PRN Route: Top  PRN Reason: pain  PRN Comment: with VAD insertion or accessing implanted port.  Start: 08/23/17 1831   Admin Instructions: Do NOT give if patient has a history of allergy to any local anesthetic or any \"jenny\" product.  Apply 30 minutes prior to VAD insertion or port access. MAX Dose: 2.5 g (  of 5 g tube).               lidocaine 1 % 1 mL  Dose: 1 mL Freq: EVERY 1 HOUR PRN Route: OTHER  PRN Comment: mild pain with VAD insertion or accessing implanted port  Start: 08/23/17 1831   Admin Instructions: Do NOT give if patient has a history of allergy to any local anesthetic or any \"jenny\" product. MAX dose 1 mL subcutaneous OR intradermal in divided doses.               miconazole (MICATIN; MICRO GUARD) 2 % powder  Freq: EVERY 1 HOUR PRN Route: Top  PRN Reason: other  PRN Comment: topical candidiasis  Start: 08/23/17 2137   Admin Instructions: Apply to affected area.                 naloxone (NARCAN) injection 0.1-0.4 mg  Dose: 0.1-0.4 mg Freq: EVERY 2 MIN PRN Route: IV  PRN Reason: opioid reversal  Start: 08/23/17 1829   Admin Instructions: For respiratory rate LESS than or EQUAL to 8.  Partial reversal dose:  0.1 mg titrated q 2 minutes for Analgesia Side Effects Monitoring Sedation Level of 3 (frequently drowsy, arousable, drifts to sleep during conversation).Full reversal dose:  0.4 mg bolus for Analgesia Side Effects Monitoring Sedation Level of 4 (somnolent, minimal or no response to stimulation).               ondansetron (ZOFRAN-ODT) ODT tab 4 mg  Dose: 4 mg Freq: EVERY 6 HOURS PRN Route: " PO  PRN Reasons: nausea,vomiting  Start: 08/23/17 1832   Admin Instructions: This is Step 1 of nausea and vomiting management.  If nausea not resolved in 15 minutes, go to Step 2 prochlorperazine (COMPAZINE). Do not push through foil backing. Peel back foil and gently remove. Place on tongue immediately. Administration with liquid unnecessary              Or  ondansetron (ZOFRAN) injection 4 mg  Dose: 4 mg Freq: EVERY 6 HOURS PRN Route: IV  PRN Reasons: nausea,vomiting  Start: 08/23/17 1832   Admin Instructions: This is Step 1 of nausea and vomiting management.  If nausea not resolved in 15 minutes, go to Step 2 prochlorperazine (COMPAZINE).  Irritant.               polyethylene glycol (MIRALAX/GLYCOLAX) Packet 17 g  Dose: 17 g Freq: DAILY PRN Route: PO  PRN Reason: constipation  Start: 08/23/17 1832   Admin Instructions: Give in 8oz of  water, juice, or soda. Hold for loose stools.  This is the second step of a three step constipation treatment protocol.  1 Packet = 17 grams. Mixed prescribed dose in 8 ounces of water. Follow with 8 oz. of water.               potassium chloride (KLOR-CON) Packet 20-40 mEq  Dose: 20-40 mEq Freq: EVERY 2 HOURS PRN Route: ORAL OR FEED  PRN Reason: potassium supplementation  Start: 08/23/17 2027   Admin Instructions: Use if unable to tolerate tablets.  If Serum K+ 3.0-3.3, dose = 60 mEq po total dose (40 mEq x1 followed in 2 hours by 20 mEq x1). Recheck K+ level 4 hours after dose and the next AM.  If Serum K+ 2.5-2.9, dose = 80 mEq po total dose (40 mEq Q2H x2). Recheck K+ level 4 hours after dose and the next AM.  If Serum K+ less than 2.5, See IV order.  Dissolve packet contents in 4-8 ounces of cold water or juice.               potassium chloride 10 mEq in 100 mL intermittent infusion  Dose: 10 mEq Freq: EVERY 1 HOUR PRN Route: IV  PRN Reason: potassium supplementation  Start: 08/23/17 2027   Admin Instructions: Infuse via PERIPHERAL LINE or CENTRAL LINE. Use for central line  replacement if patient weight less than 65 kg, if patient is on TPN with high potassium content or if unit does not stock 20 mEq bags.   If Serum K+ 3.0-3.3, dose = 10 mEq/hr x4 doses (40 mEq IV total dose). Recheck K+ level 2 hours after dose and the next AM.   If Serum K+ less than 3.0, dose = 10 mEq/hr x6 doses (60 mEq IV total dose). Recheck K+ level 2 hours after dose and the next AM.               potassium chloride 10 mEq in 100 mL intermittent infusion with 10 mg lidocaine  Dose: 10 mEq Freq: EVERY 1 HOUR PRN Route: IV  PRN Reason: potassium supplementation  Start: 08/23/17 2027   Admin Instructions: Infuse via PERIPHERAL LINE. Use potassium with lidocaine for pain with peripheral administration.  If Serum K+ 3.0-3.3, dose = 10 mEq/hr x4 doses (40 mEq IV total dose). Recheck K+ level 2 hours after dose and the next AM.  If Serum K+ less than 3.0, dose = 10 mEq/hr x6 doses (60 mEq IV total dose). Recheck K+ level 2 hours after dose and the next AM.               potassium chloride 20 mEq in 50 mL intermittent infusion  Dose: 20 mEq Freq: EVERY 1 HOUR PRN Route: IV  PRN Reason: potassium supplementation  Start: 08/23/17 2027   Admin Instructions: Infuse via CENTRAL LINE Only. May need EKG if less than 65 kg or on TPN - Max rate is 0.3 mEq/kg/hr for patients not on EKG monitoring.   If Serum K+ 3.0-3.3, dose = 20 mEq/hr x2 doses (40 mEq IV total dose). Recheck K+ level 2 hours after dose and the next AM.  If Serum K+ less than 3.0, dose = 20 mEq/hr x3 doses (60 mEq IV total dose). Recheck K+ level 2 hours after dose and the next AM.               potassium chloride SA (K-DUR/KLOR-CON M) CR tablet 20-40 mEq  Dose: 20-40 mEq Freq: EVERY 2 HOURS PRN Route: PO  PRN Reason: potassium supplementation  Start: 08/23/17 2027   Admin Instructions: Use if able to take PO.   If Serum K+ 3.0-3.3, dose = 60 mEq po total dose (40 mEq x1 followed in 2 hours by 20 mEq x1). Recheck K+ level 4 hours after dose and the next  AM.  If Serum K+ 2.5-2.9, dose = 80 mEq po total dose (40 mEq Q2H x2). Recheck K+ level 4 hours after dose and the next AM.  If Serum K+ less than 2.5, See IV order.  DO NOT CRUSH          2208 (40 mEq)-Given        0054 (40 mEq)-Given       0642 (40 mEq)-Given       0815 (20 mEq)-Given           prochlorperazine (COMPAZINE) injection 5 mg  Dose: 5 mg Freq: EVERY 6 HOURS PRN Route: IV  PRN Reasons: nausea,vomiting  Start: 08/23/17 1832   Admin Instructions: This is Step 2 of nausea and vomiting management.   If nausea not resolved in 15 minutes, give metoclopramide (REGLAN) if ordered (step 3 of nausea and vomiting management)              Or  prochlorperazine (COMPAZINE) tablet 5 mg  Dose: 5 mg Freq: EVERY 6 HOURS PRN Route: PO  PRN Reason: vomiting  Start: 08/23/17 1832   Admin Instructions: This is Step 2 of nausea and vomiting management.   If nausea not resolved in 15 minutes, give metoclopramide (REGLAN) if ordered (step 3 of nausea and vomiting management)              Or  prochlorperazine (COMPAZINE) Suppository 12.5 mg  Dose: 12.5 mg Freq: EVERY 12 HOURS PRN Route: RE  PRN Reasons: nausea,vomiting  Start: 08/23/17 1832   Admin Instructions: This is Step 2 of nausea and vomiting management.   If nausea not resolved in 15 minutes, give metoclopramide (REGLAN) if ordered (step 3 of nausea and vomiting management)               senna-docusate (SENOKOT-S;PERICOLACE) 8.6-50 MG per tablet 1-2 tablet  Dose: 1-2 tablet Freq: 2 TIMES DAILY PRN Route: PO  PRN Comment: constipation   Start: 08/23/17 1832   Admin Instructions: If no bowel movement in 24 hours, increase to 2 tablets PO BID.  Hold for loose stools.   This is the first step of a three step constipation treatment protocol.               sodium chloride (PF) 0.9% PF flush 3 mL  Dose: 3 mL Freq: EVERY 8 HOURS Route: IK  Start: 08/23/17 1845   Admin Instructions: And Q1H PRN, to lock peripheral IV dormant line.          (2019)-Not Given        0247 (3  mL)-Given       1225 (3 mL)-Given       [ ] 1845           sodium chloride (PF) 0.9% PF flush 3 mL  Dose: 3 mL Freq: EVERY 1 HOUR PRN Route: IK  PRN Reason: line flush  Start: 08/23/17 1831   Admin Instructions: for peripheral IV flush post IV meds               traZODone (DESYREL) tablet 100 mg  Dose: 100 mg Freq: AT BEDTIME PRN Route: PO  PRN Reason: sleep  Start: 08/23/17 2046          0255 (100 mg)-Given          Future Medications  Medications 08/18/17 08/19/17 08/20/17 08/21/17 08/22/17 08/23/17 08/24/17       glipiZIDE (GLUCOTROL XL) 24 hr tablet 10 mg  Dose: 10 mg Freq: DAILY WITH BREAKFAST Route: PO  Start: 08/25/17 0900              insulin aspart (NovoLOG) inj (RAPID ACTING)  Dose: 1-7 Units Freq: AT BEDTIME Route: SC  Start: 08/24/17 2200   Admin Instructions: HIGH INSULIN RESISTANCE DOSING    Do Not give Bedtime Correction Insulin if BG less than 200.   For  - 224 give 1 units.   For  - 249 give 2 units.   For  - 274 give 3 units.   For  - 299 give 4 units.   For  - 324 give 5 units.   For  - 349 give 6 units.   For BG greater than or equal to 350 give 7 units.   Notify provider if glucose greater than or equal to 350 mg/dL after administration of correction dose.  If given at mealtime, must be administered 5 min before meal or immediately after.           [ ] 2200          Discontinued Medications  Medications 08/18/17 08/19/17 08/20/17 08/21/17 08/22/17 08/23/17 08/24/17         Dose: 5 mg Freq: DAILY WITH BREAKFAST Route: PO  Start: 08/24/17 0900   End: 08/24/17 1302          0916 (5 mg)-Given       1302-Med Discontinued         Dose: 1-5 Units Freq: AT BEDTIME Route: SC  Start: 08/23/17 2200   End: 08/24/17 1213   Admin Instructions: MEDIUM INSULIN RESISTANCE DOSING    Do Not give Bedtime Correction Insulin if BG less than  200.   For  - 249 give 1 units.   For  - 299 give 2 units.   For  - 349 give 3 units.   For  -399 give 4 units.   For  BG greater than or equal to 400 give 5 units.  Notify provider if glucose greater than or equal to 350 mg/dL after administration of correction dose.  If given at mealtime, must be administered 5 min before meal or immediately after.          2208 (3 Units)-Given [C]        1213-Med Discontinued         Dose: 1-7 Units Freq: 3 TIMES DAILY BEFORE MEALS Route: SC  Start: 08/23/17 1845   End: 08/24/17 1213   Admin Instructions: Correction Scale - MEDIUM INSULIN RESISTANCE DOSING     Do Not give Correction Insulin if Pre-Meal BG less than 140.   For Pre-Meal  - 189 give 1 unit.   For Pre-Meal  - 239 give 2 units.   For Pre-Meal  - 289 give 3 units.   For Pre-Meal  - 339 give 4 units.   For Pre-Meal - 399 give 5 units.   For Pre-Meal -449 give 6 units  For Pre-Meal BG greater than or equal to 450 give 7 units.   To be given with prandial insulin, and based on pre-meal blood glucose.    Notify provider if glucose greater than or equal to 350 mg/dL after administration of correction dose.  If given at mealtime, must be administered 5 min before meal or immediately after.          (2017)-Not Given [C]        0922 (4 Units)-Given       1213-Med Discontinued               Dose: 10 mEq Freq: 3 TIMES DAILY Route: PO  Start: 08/23/17 2200   End: 08/24/17 1023   Admin Instructions: DO NOT CRUSH.          2207 (10 mEq)-Given        0916 (10 mEq)-Given       1023-Med Discontinued

## 2017-08-23 NOTE — IP AVS SNAPSHOT
` ` Patient Information     Patient Name Sex     Chavez Mccain ([id number]) Male 1940       Room Bed    6604 6604-02      Patient Demographics     Address Phone    [address] [place] [address] [phone] (Home)  [phone] (Mobile)      Patient Ethnicity & Race     Ethnic Group Patient Race    American White      Emergency Contact(s)     Name Relation Home Work Mobile    Maria Dolores Mccain Spouse [phone]    Charmaine Mccain Grandchild [phone]      Documents on File        Status Date Received Description       Documents for the Patient    Face Sheet Received () 10/19/09     Privacy Notice - [place] Received 11     Insurance Card Received 02/26/15     External Medication Information Consent       Patient ID Received 02/26/15     Consent for Services - Hospital/Clinic Received () 12     CMS IM for Patient Signature       Consent for EHR Access  13 Copied from existing Consent for services - IP/ED collected on 2012    [facility] Specified Other       Consent for Services - Hospital/Clinic Received () 02/26/15     HIM MICHELE Authorization  03/03/15     Advance Directives and Living Will Received 03/05/15 Health Care Directive 07    Advance Directives and Living Will Not Received  Validation of AD 07    HIM MICHELE Authorization  () 04/06/15 [facility]    Consent for Services/Privacy Notice - Hospital/Clinic Received () 07/10/16     Patient ID Received 07/10/16     Insurance Card Received 07/10/16     Insurance Card Received 17     Patient ID Received 17     Consent for Services/Privacy Notice - Hospital/Clinic Received 17     Advance Directives and Living Will  (Deleted)         Documents for the Encounter    CMS IM for Patient Signature       Observation Notice Received 17       Admission Information     Attending Provider Admitting Provider Admission Type  Admission Date/Time    Morgan Banegas MD Rauniyar, Amit Kumar, MD Urgent 08/23/17  1819    Discharge Date Hospital Service Auth/Cert Status Service Area     Internal Medicine Incomplete [facility] SERVICES    Unit Room/Bed Admission Status        [facility] UNIT 6604/6604-02 Admission (Confirmed)       Admission     Complaint    Generalized weakness, Frequent falls      Hospital Account     Name Acct ID Class Status Primary Coverage    Chavez Mccain [id number] Observation Open MEDICARE - MEDICARE FOR HB SUPPLEMENT            Guarantor Account (for Hospital Account #[id number])     Name Relation to Pt Service Area Active? Acct Type    Chavez Mccain Self FCS Yes Personal/Family    Address Phone          [address] [place] [address] [phone](H)  none(O)              Coverage Information (for Hospital Account #[id number])     1. MEDICARE/MEDICARE FOR HB SUPPLEMENT     F/O Payor/Plan Precert #    MEDICARE/MEDICARE FOR HB SUPPLEMENT     Subscriber Subscriber #    Chavez Mccain [id number]    Address Phone    [employer]  [address]  [place] [address] [phone]          2. [employer]     F/O Payor/Plan Precert #    [employer]     Subscriber Subscriber #    Chavez Mccain [id number]    Address Phone    [address]  [place] [address] [phone]

## 2017-08-23 NOTE — IP AVS SNAPSHOT
[place] [address] [facility]    [address] [place] [address]    Phone:  [phone]                                       After Visit Summary   8/23/2017    Chavez Mccain    MRN: [id number]           After Visit Summary Signature Page     I have received my discharge instructions, and my questions have been answered. I have discussed any challenges I see with this plan with the nurse or doctor.    ..........................................................................................................................................  Patient/Patient Representative Signature      ..........................................................................................................................................  Patient Representative Print Name and Relationship to Patient    ..................................................               ................................................  Date                                            Time    ..........................................................................................................................................  Reviewed by Signature/Title    ...................................................              ..............................................  Date                                                            Time

## 2017-08-23 NOTE — IP AVS SNAPSHOT
"` `           [place] [address] [facility]: [phone]                                              INTERAGENCY TRANSFER FORM - NURSING   2017                    Hospital Admission Date: 2017  HIPOLITO THOMPSON   : 1940  Sex: Male        Attending Provider: Morgan Banegas MD     Allergies:  No Known Allergies    Infection:  None   Service:  INTERNAL MED    Ht:  1.626 m (5' 4\")   Wt:  115.8 kg (255 lb 6.4 oz)   Admission Wt:  115.8 kg (255 lb 6.4 oz)    BMI:  43.84 kg/m 2   BSA:  2.29 m 2            Patient PCP Information     Provider PCP Type    Vasile Brooks MD General      Current Code Status     Date Active Code Status Order ID Comments User Context       2017  6:33 PM DNR/DNI [id number]  Vance Treadwell PA-C Inpatient       Code Status History     Date Active Date Inactive Code Status Order ID Comments User Context    3/3/2015  9:00 AM 2017  6:33 PM DNR/DNI [id number]  Lorie Stoll MD Outpatient    3/2/2015 12:24 PM 3/3/2015  9:00 AM DNR/DNI [id number]  Lyn Infante MD Inpatient    2015  4:24 PM 3/1/2015  3:06 PM DNR/DNI [id number]  Efrem Cunningham MD Inpatient    2015  2:44 PM 2015  4:24 PM Full Code [id number]  Efrem Cunningham MD Inpatient    2011 12:55 AM 2011  6:10 PM Full Code [id number]  Erlinda Ortiz, NANCY Inpatient      Advance Directives        Does patient have a scanned Advance Directive/ACP document in EPIC?           Yes        Hospital Problems as of 2017              Priority Class Noted POA    Frequent falls Medium  2017 Yes      Non-Hospital Problems as of 2017              Priority Class Noted    Complicated UTI (urinary tract infection) Medium  2015    Urinary retention Medium  3/2/2015      Immunizations     None         END      ASSESSMENT     Discharge Profile Flowsheet     DISCHARGE NEEDS ASSESSMENT     Patient's communication style  spoken language (English or " "Bilingual) 06/23/17 2112    Concerns To Be Addressed  discharge planning concerns;cognitive/perceptual concerns 02/27/15 1617   FINAL RESOURCES      Transportation Available  family or friend will provide 03/01/15 1352   Existing Resources/Services  None 02/27/15 1617    Equipment Used at Home  bath bench;grab bar;raised toilet;walker, rolling 02/27/15 1617   SKIN      GASTROINTESTINAL (ADULT,PEDIATRIC,OB)     Inspection of bony prominences  Full 08/24/17 1043    GI WDL  ex 08/24/17 1043   Skin WDL  ex 08/24/17 1043    Abdominal Appearance  obese 08/24/17 1043   Skin Integrity  bruise(s);rash(es) 08/24/17 1043    Last Bowel Movement  08/24/17 08/24/17 1043   SAFETY      Passing flatus  yes 08/24/17 1043   Safety WDL  WDL 08/24/17 1003    COMMUNICATION ASSESSMENT                        Assessment WDL (Within Defined Limits) Definitions           Safety WDL     Effective: 09/28/15    Row Information: <b>WDL Definition:</b> Bed in low position, wheels locked; call light in reach; upper side rails up x 2; ID band on<br> <font color=\"gray\"><i>Item=AS safety wdl>>List=AS safety wdl>>Version=F14</i></font>      Skin WDL     Effective: 09/28/15    Row Information: <b>WDL Definition:</b> Warm; dry; intact; elastic; without discoloration; pressure points without redness<br> <font color=\"gray\"><i>Item=AS skin wdl>>List=AS skin wdl>>Version=F14</i></font>      Vitals     Vital Signs Flowsheet     VITAL SIGNS     Height  1.626 m (5' 4\") 08/23/17 1849    Temp  98  F (36.7  C) 08/24/17 0756   Weight  115.8 kg (255 lb 6.4 oz) 08/23/17 1849    Temp src  Oral 08/24/17 0756   Weight Method  Bed scale 08/23/17 1849    Resp  18 08/24/17 0756   BSA (Calculated - sq m)  2.29 08/23/17 1849    Pulse  81 08/24/17 0756   BMI (Calculated)  43.93 08/23/17 1849    Pulse/Heart Rate Source  Monitor 08/24/17 0756   POSITIONING      BP  151/77 08/24/17 0756   Body Position  supine, head elevated 08/24/17 1043    BP Location  Right arm 08/24/17 0756  "  Head of Bed (HOB)  HOB at 30-45 degrees 08/24/17 0103    OXYGEN THERAPY     Positioning/Transfer Devices  pillows;in use 08/24/17 1003    SpO2  96 % 08/24/17 0756   Chair  Upright in chair 08/24/17 1003    O2 Device  None (Room air) 08/24/17 0756   DAILY CARE      PAIN/COMFORT     Activity Type  ambulated to bathroom 08/24/17 1116    Patient Currently in Pain  denies 08/24/17 0103   Activity Level of Assistance  assistance, 1 person 08/24/17 1116    HEIGHT AND WEIGHT     Activity Assistive Device  gait belt;walker 08/24/17 1116            Patient Lines/Drains/Airways Status    Active LINES/DRAINS/AIRWAYS     Name: Placement date: Placement time: Site: Days: Last dressing change:    Urethral Catheter Triple-lumen 20 fr 03/02/15   0800   Triple-lumen   906     Urethral Catheter Coude 14 fr 07/10/16   1053   Coude   410     Urethral Catheter Coude 18 fr 06/23/17   2329   Coude   61     Urethral Catheter 08/23/17 2031      less than 1     Peripheral IV 08/23/17 08/23/17 2029      less than 1     Wound Left Heel dry patch       Heel   85427     Rash Left lower leg        08154     Rash 02/26/15 Bilateral 02/26/15       910     Rash 08/23/17 2135 groin 08/23/17 2135    less than 1             Patient Lines/Drains/Airways Status    Active PICC/CVC     None            Intake/Output Detail Report     Date Intake     Output Net    Shift P.O. I.V. IV Piggyback Total Urine Total       Day 08/23/17 0700 - 08/23/17 1459 -- -- -- -- -- -- 0    Bhargavi 08/23/17 1500 - 08/23/17 2259 240 -- -- 240 500 500 -260    Noc 08/23/17 2300 - 08/24/17 0659 -- -- -- -- 300 300 -300    Day 08/24/17 0700 - 08/24/17 1459 360 -- -- 360 400 400 -40    Bhargavi 08/24/17 1500 - 08/24/17 2259 -- -- -- -- -- -- 0      Last Void/BM       Most Recent Value    Urine Occurrence     Stool Occurrence 1 at 08/24/2017 1116      Case Management/Discharge Planning     Case Management/Discharge Planning Flowsheet     REFERRAL INFORMATION     Transportation Available   family or friend will provide 03/01/15 1352    Arrived From  emergency department (came from [facility]) 03/03/15 0940   Skilled Nursing Facility  [facility] 12/12/11 1204    LIVING ENVIRONMENT     Skilled Nursing Facility Phone Number  [phone] 12/12/11 1204    Lives With  spouse 08/23/17 1927   Equipment Used at Home  bath bench;grab bar;raised toilet;walker, rolling 02/27/15 1617    Living Arrangements  house 02/27/15 1617   FINAL RESOURCES      Provides Primary Care For  no one 12/08/11 1211   Existing Resources/Services  None 02/27/15 1617    COPING/STRESS     ABUSE RISK SCREEN      Major Change/Loss/Stressor  hospitalization 08/23/17 2009   QUESTION TO PATIENT:  Has a member of your family or a partner(now or in the past) intimidated, hurt, manipulated, or controlled you in any way?  no 08/23/17 2009    Patient Personal Strengths  expressive of needs 12/08/11 1211   QUESTION TO PATIENT: Do you feel safe going back to the place where you are living?  yes 08/23/17 2009    ASSESSMENT/CONCERNS TO BE ADDRESSED     OBSERVATION: Is there reason to believe there has been maltreatment of a vulnerable adult (ie. Physical/Sexual/Emotional abuse, self neglect, lack of adequate food, shelter, medical care, or financial exploitation)?  no 08/23/17 2009    Concerns To Be Addressed  discharge planning concerns;cognitive/perceptual concerns 02/27/15 1617   (R) MENTAL HEALTH SUICIDE RISK      DISCHARGE PLANNING     Are you depressed or being treated for depression?  No 08/23/17 2009

## 2017-08-23 NOTE — IP AVS SNAPSHOT
"[place] [address] [facility]: [phone]                                              INTERAGENCY TRANSFER FORM - PHYSICIAN ORDERS   2017                    Hospital Admission Date: 2017  HIPOLITO THOMPSON   : 1940  Sex: Male        Attending Provider: (none)    Allergies:  No Known Allergies    Infection:  None   Service:  INTERNAL MED    Ht:  1.626 m (5' 4\")   Wt:  115.8 kg (255 lb 6.4 oz)   Admission Wt:  115.8 kg (255 lb 6.4 oz)    BMI:  43.84 kg/m 2   BSA:  2.29 m 2            Patient PCP Information     Provider PCP Type    Vasile Brooks MD General      ED Clinical Impression     Diagnosis Description Comment Added By Time Added    Type 2 diabetes mellitus without complication, without long-term current use of insulin (H) [E11.9] Type 2 diabetes mellitus without complication, without long-term current use of insulin (H) [E11.9]  Shefali Barajas PA-C 2017  1:03 PM      Hospital Problems as of 2017              Priority Class Noted POA    Frequent falls Medium  2017 Yes      Non-Hospital Problems as of 2017              Priority Class Noted    Complicated UTI (urinary tract infection) Medium  2015    Urinary retention Medium  3/2/2015      Code Status History     Date Active Date Inactive Code Status Order ID Comments User Context    3/3/2015  9:00 AM 2017  6:33 PM DNR/DNI [id number]  Lorie Stoll MD Outpatient    3/2/2015 12:24 PM 3/3/2015  9:00 AM DNR/DNI [id number]  Lyn Infante MD Inpatient    2015  4:24 PM 3/1/2015  3:06 PM DNR/DNI [id number]  Efrem Cunningham MD Inpatient    2015  2:44 PM 2015  4:24 PM Full Code [id number]  Efrem Cunningham MD Inpatient    2011 12:55 AM 2011  6:10 PM Full Code [id number]  Erlinda Ortiz, NANCY Inpatient         Medication Review      CONTINUE these medications which may have CHANGED, or have new prescriptions. If we are uncertain of the size of " tablets/capsules you have at home, strength may be listed as something that might have changed.        Dose / Directions Comments    glipiZIDE 10 MG 24 hr tablet   Commonly known as:  GLUCOTROL XL   This may have changed:    - medication strength  - how much to take   Used for:  Type 2 diabetes mellitus without complication, without long-term current use of insulin (H)        Dose:  10 mg   Start taking on:  8/25/2017   Take 1 tablet (10 mg) by mouth daily (with breakfast)   Quantity:  30 tablet   Refills:  0          CONTINUE these medications which have NOT CHANGED        Dose / Directions Comments    aspirin 81 MG EC tablet        Dose:  81 mg   Take 81 mg by mouth every evening   Refills:  0        atorvastatin 10 MG tablet   Commonly known as:  LIPITOR        Dose:  10 mg   Take 10 mg by mouth every morning   Refills:  0        cholecalciferol 1000 UNIT tablet   Commonly known as:  vitamin D        Dose:  1000 Units   Take 1,000 Units by mouth every evening   Refills:  0        CITALOPRAM HYDROBROMIDE PO        Dose:  20 mg   Take 20 mg by mouth daily   Refills:  0        cyanocobalamin 1000 MCG Tabs        Dose:  1000 mcg   Take 1,000 mcg by mouth every evening   Refills:  0        DONEPEZIL HCL PO        Dose:  10 mg   Take 10 mg by mouth daily   Refills:  0        FINASTERIDE PO        Dose:  5 mg   Take 5 mg by mouth daily   Refills:  0        irbesartan 300 MG tablet   Commonly known as:  AVAPRO        Dose:  300 mg   Take 300 mg by mouth daily.   Refills:  0        KEFLEX PO   Indication:  UTI Prophylaxis        Dose:  250 mg   Take 250 mg by mouth daily   Refills:  0        loperamide 2 MG capsule   Commonly known as:  IMODIUM        Dose:  2 mg   Take 2 mg by mouth 2 times daily   Refills:  0        MAGNESIUM OXIDE PO   Indication:  Disorder with Low Magnesium Levels        Dose:  400 mg   Take 400 mg by mouth daily   Refills:  0        metolazone 2.5 MG tablet   Commonly known as:  ZAROXOLYN         Dose:  2.5 mg   Take 2.5 mg by mouth daily.   Refills:  0        miconazole 2 % cream   Commonly known as:  MICATIN   Indication:  Ringworm of the Body, Ringworm of Groin Area        Apply topically 2 times daily   Refills:  0        nystatin 338889 UNIT/GM Powd   Commonly known as:  MYCOSTATIN   Indication:  Skin Infection due to Candida Yeast        Dose:  1 g   Apply 1 g topically 2 times daily as needed   Refills:  0        potassium chloride 10 MEQ CR tablet   Commonly known as:  KLOR-CON        Dose:  10 mEq   Take 10 mEq by mouth 3 times daily   Refills:  0        traZODone 100 MG tablet   Commonly known as:  DESYREL        Dose:  100 mg   Take 100 mg by mouth nightly as needed for sleep   Refills:  0                Summary of Visit     Reason for your hospital stay       You were hospitalized for further evaluation and treatment of generalized weakness and fall.             After Care     Activity - Up with nursing assistance           Additional Discharge Instructions       1. Follow up with PCP in a week to discuss hospitalization, discuss need to further uptitrate diabetes meds, and repeat BMP   2. Glipizide increased to 10 mg by mouth daily, if blood glucose remains elevated, consider initiation of metformin  3. Adequate oral fluid intake encouraged       Advance Diet as Tolerated       Follow this diet upon discharge: Moderate Consistent CHO Diet       Daily weights       Call Provider for weight gain of more than 2 pounds per day or 5 pounds per week.       Encourage PO fluids           Fall precautions           Sequeira catheter       To straight gravity drainage. Change catheter every 2 weeks and PRN for leaking or decreased uring output with signs of bladder distention. DO NOT change catheter without a specific MD order IF diagnosis of benign prostatic hypertrophy (BPH), neurogenic bladder, or other urological conditions       General info for SNF       Length of Stay Estimate: Short Term Care:  Estimated # of Days <30  Condition at Discharge: Stable  Level of care:skilled   Rehabilitation Potential: Fair  Admission H&P remains valid and up-to-date: Yes  Recent Chemotherapy: N/A  Use Nursing Home Standing Orders: Yes       Glucose monitor nursing POCT       Before meals and at bedtime       Hip precautions           Intake and output       Every shift       Mantoux instructions       Give two-step Mantoux (PPD) Per Facility Policy Yes             Referrals     Nutrition Services Adult IP Consult       Reason:  Poor oral fluid intake. Diabetic.       Occupational Therapy Adult Consult       Evaluate and treat as clinically indicated.    Reason:  Physical deconditioning       Physical Therapy Adult Consult       Evaluate and treat as clinically indicated.    Reason:  Physical deconditioning             Follow-Up Appointment Instructions     Future Labs/Procedures    Follow Up and recommended labs and tests     Comments:    Follow up with jail physician.  The following labs/tests are recommended: glucose, BMP.      Follow-Up Appointment Instructions     Follow Up and recommended labs and tests       Follow up with jail physician.  The following labs/tests are recommended: glucose, BMP.             Statement of Approval     Ordered          08/24/17 1458  I have reviewed and agree with all the recommendations and orders detailed in this document.  EFFECTIVE NOW     Approved and electronically signed by:  Shefali Barajas PA-C

## 2017-08-23 NOTE — IP AVS SNAPSHOT
"    [place] [address] [facility]: [phone]                                              INTERAGENCY TRANSFER FORM - LAB / IMAGING / EKG / EMG RESULTS   2017                    Hospital Admission Date: 2017  HIPOLITO THOMPSON   : 1940  Sex: Male        Attending Provider: Morgan Banegas MD     Allergies:  No Known Allergies    Infection:  None   Service:  INTERNAL MED    Ht:  1.626 m (5' 4\")   Wt:  115.8 kg (255 lb 6.4 oz)   Admission Wt:  115.8 kg (255 lb 6.4 oz)    BMI:  43.84 kg/m 2   BSA:  2.29 m 2            Patient PCP Information     Provider PCP Type    Vasile Brooks MD General         Lab Results - 3 Days      Potassium [[id number]]  Resulted: 17 1257, Result status: Final result    Ordering provider: Shefali Barajas PA-C  17 0825 Resulting lab: [facility]    Specimen Information    Type Source Collected On   Blood  17 1235          Components       Value Reference Range Flag Lab   Potassium 3.7 3.4 - 5.3 mmol/L  FrStHsLb            Glucose by meter [[id number]] (Abnormal)  Resulted: 17 1211, Result status: Final result    Ordering provider: Morgan Banegas MD  17 1207 Resulting lab: POINT OF CARE TEST, GLUCOSE    Specimen Information    Type Source Collected On     17 1207          Components       Value Reference Range Flag Lab   Glucose 335 70 - 99 mg/dL H 170            Glucose by meter [[id number]] (Abnormal)  Resulted: 17 0806, Result status: Final result    Ordering provider: Morgan Banegas MD  17 0758 Resulting lab: POINT OF CARE TEST, GLUCOSE    Specimen Information    Type Source Collected On     17 0758          Components       Value Reference Range Flag Lab   Glucose 299 70 - 99 mg/dL H 170            Hemoglobin A1c [[id number]] (Abnormal)  Resulted: 17 0555, Result status: Final result    Ordering provider: Vance rTeadwell PA-C  17 0001 " Resulting lab: [facility]    Specimen Information    Type Source Collected On   Blood  08/24/17 0512          Components       Value Reference Range Flag Lab   Hemoglobin A1C 8.3 4.3 - 6.0 % H FrStHsLb            Basic metabolic panel [[id number]] (Abnormal)  Resulted: 08/24/17 0545, Result status: Final result    Ordering provider: Vance Treadwell PA-C  08/24/17 0001 Resulting lab: [facility]    Specimen Information    Type Source Collected On   Blood  08/24/17 0512          Components       Value Reference Range Flag Lab   Sodium 137 133 - 144 mmol/L  FrStHsLb   Potassium 3.2 3.4 - 5.3 mmol/L L FrStHsLb   Chloride 101 94 - 109 mmol/L  FrStHsLb   Carbon Dioxide 28 20 - 32 mmol/L  FrStHsLb   Anion Gap 8 3 - 14 mmol/L  FrStHsLb   Glucose 257 70 - 99 mg/dL H FrStHsLb   Urea Nitrogen 15 7 - 30 mg/dL  FrStHsLb   Creatinine 0.99 0.66 - 1.25 mg/dL  FrStHsLb   GFR Estimate 73 >60 mL/min/1.7m2  FrStHsLb   Comment:  Non  GFR Calc   GFR Estimate If Black 89 >60 mL/min/1.7m2  FrStHsLb   Comment:  African American GFR Calc   Calcium 8.7 8.5 - 10.1 mg/dL  FrStHsLb            Glucose by meter [[id number]] (Abnormal)  Resulted: 08/24/17 0545, Result status: Final result    Ordering provider: Morgan Banegas MD  08/24/17 0245 Resulting lab: POINT OF CARE TEST, GLUCOSE    Specimen Information    Type Source Collected On     08/24/17 0245          Components       Value Reference Range Flag Lab   Glucose 248 70 - 99 mg/dL H 170            Glucose by meter [[id number]] (Abnormal)  Resulted: 08/23/17 2206, Result status: Final result    Ordering provider: Morgan Banegas MD  08/23/17 2202 Resulting lab: POINT OF CARE TEST, GLUCOSE    Specimen Information    Type Source Collected On     08/23/17 2202          Components       Value Reference Range Flag Lab   Glucose 333 70 - 99 mg/dL H 170            Magnesium [[id number]]  Resulted: 08/23/17 1947, Result status: Final result     "Ordering provider: Vance Treadwell PA-C  08/23/17 1833 Resulting lab: [facility]    Specimen Information    Type Source Collected On   Blood  08/23/17 1920          Components       Value Reference Range Flag Lab   Magnesium 1.9 1.6 - 2.3 mg/dL  FrStHsLb            Basic metabolic panel [[id number]] (Abnormal)  Resulted: 08/23/17 1947, Result status: Final result    Ordering provider: Vance Treadwell PA-C  08/23/17 1833 Resulting lab: [facility]    Specimen Information    Type Source Collected On   Blood  08/23/17 1920          Components       Value Reference Range Flag Lab   Sodium 135 133 - 144 mmol/L  FrStHsLb   Potassium 2.9 3.4 - 5.3 mmol/L L FrStHsLb   Chloride 98 94 - 109 mmol/L  FrStHsLb   Carbon Dioxide 28 20 - 32 mmol/L  FrStHsLb   Anion Gap 9 3 - 14 mmol/L  FrStHsLb   Glucose 258 70 - 99 mg/dL H FrStHsLb   Urea Nitrogen 16 7 - 30 mg/dL  FrStHsLb   Creatinine 0.91 0.66 - 1.25 mg/dL  FrStHsLb   GFR Estimate 81 >60 mL/min/1.7m2  FrStHsLb   Comment:  Non  GFR Calc   GFR Estimate If Black >90 >60 mL/min/1.7m2  FrStHsLb   Comment:  African American GFR Calc   Calcium 8.9 8.5 - 10.1 mg/dL  FrStHsLb            Testing Performed By     Lab - Abbreviation Name Director Address Valid Date Range    14 - FrStHsLb [facility] Unknown [address] [place] [address] 05/08/15 1057 - Present    170 - Unknown POINT OF CARE TEST, GLUCOSE Unknown Unknown 10/31/11 1114 - Present            Unresulted Labs (24h ago through future)    Start       Ordered    Unscheduled  Potassium  (Potassium Replacement - \"Standard\" - For K levels less than 3.4 mmol/L - UU,UR,UA,RH,SH,PH,WY )  CONDITIONAL (SPECIFY),   Routine     Comments:  Obtain Potassium Level for these conditions:  *IF no potassium result within 24 hours before initiation of order set, draw potassium level with next lab collect.    *2 HOURS AFTER last IV potassium replacement dose and 4 hours after an oral " replacement dose.  *Next morning after potassium dose.     Repeat Potassium Replacement if necessary.    08/23/17 2027      Encounter-Level Documents:     There are no encounter-level documents.      Order-Level Documents:     There are no order-level documents.

## 2017-08-23 NOTE — IP AVS SNAPSHOT
Chavez Mccain #[id number] (CSN: [id number])  (76 year old M)  (Adm: 17)     [id number]               [place] [address] MEDICAL SPECIALTY UNIT: [phone]            Patient Demographics     Patient Name Sex          Age SSN Address Phone    Chavez Mccain Male 1940 (76 year old) [id number] [facility] [address] [phone] (Home)  [phone] (Mobile)      Emergency Contact(s)     Name Relation Home Work Mobile    Maria Dolores Mccain Spouse [phone]    Charmaine Mccain Grandchild [phone]      Admission Information     Attending Provider Admitting Provider Admission Type Admission Date/Time    Morgan Banegas MD Rauniyar, Amit Kumar, MD Urgent 17  1819    Discharge Date Hospital Service Auth/Cert Status Service Area     Internal Medicine Incomplete [facility] SERVICES    Unit Room/Bed Admission Status       [facility] MED SPEC UNIT 6604/6604-02 Admission (Confirmed)       Admission     Complaint    Generalized weakness, Frequent falls      Hospital Account     Name Acct ID Class Status Primary Coverage    Chavez Mccain [id number] Observation Open MEDICARE - MEDICARE FOR HB SUPPLEMENT            Guarantor Account (for Hospital Account #[id number])     Name Relation to Pt Service Area Active? Acct Type    Chavez Mccain Self FCS Yes Personal/Family    Address Phone          [address] [place] [address] [phone](H)  none(O)              Coverage Information (for Hospital Account #[id number])     1. MEDICARE/MEDICARE FOR HB SUPPLEMENT     F/O Payor/Plan Precert #    MEDICARE/MEDICARE FOR HB SUPPLEMENT     Subscriber Subscriber #    Chavez Mccain [id number]    Address Phone    ATTN CLAIMS  [address]  [place] [address] [phone]          2. [employer]ERS/[employer] FREEDOM PLAN     F/O Payor/Plan Precert #    HE[employer]NERS/HE[employer]ERS  "FREEDOM PLAN     Subscriber Subscriber #    Chavez Mccain [id number]    Address Phone    [address]  [place] [address] [phone]                                                      INTERAGENCY TRANSFER FORM - PHYSICIAN ORDERS   8/23/2017                       [place] [address] MEDICAL SPECIALTY UNIT: [phone]            Attending Provider: Morgan Banegas MD     Allergies:  No Known Allergies    Infection:  None   Service:  INTERNAL MED    Ht:  1.626 m (5' 4\")   Wt:  115.8 kg (255 lb 6.4 oz)   Admission Wt:  115.8 kg (255 lb 6.4 oz)    BMI:  43.84 kg/m 2   BSA:  2.29 m 2            ED Clinical Impression     Diagnosis Description Comment Added By Time Added    Type 2 diabetes mellitus without complication, without long-term current use of insulin (H) [E11.9] Type 2 diabetes mellitus without complication, without long-term current use of insulin (H) [E11.9]  Shefali Barajas PA-C 8/24/2017  1:03 PM      Hospital Problems as of 8/24/2017              Priority Class Noted POA    Frequent falls Medium  8/23/2017 Yes      Non-Hospital Problems as of 8/24/2017              Priority Class Noted    Complicated UTI (urinary tract infection) Medium  2/26/2015    Urinary retention Medium  3/2/2015      Code Status History     Date Active Date Inactive Code Status Order ID Comments User Context    3/3/2015  9:00 AM 8/23/2017  6:33 PM DNR/DNI [id number]  Lorie Stoll MD Outpatient    3/2/2015 12:24 PM 3/3/2015  9:00 AM DNR/DNI [id number]  Lyn Infante MD Inpatient    2/26/2015  4:24 PM 3/1/2015  3:06 PM DNR/DNI [id number]  Efrem Cunningham MD Inpatient    2/26/2015  2:44 PM 2/26/2015  4:24 PM Full Code [id number]  Efrem Cunningham MD Inpatient    12/7/2011 12:55 AM 12/12/2011  6:10 PM Full Code [id number]  Erlinda Ortiz RN Inpatient      Current Code Status     Date Active Code Status Order ID Comments User Context       8/23/2017  6:33 PM DNR/DNI [id number]  Eben, " MELISA Woodard Inpatient       Summary of Visit     Reason for your hospital stay       You were hospitalized for further evaluation and treatment of generalized weakness and fall.                Medication Review      CONTINUE these medications which may have CHANGED, or have new prescriptions. If we are uncertain of the size of tablets/capsules you have at home, strength may be listed as something that might have changed.        Dose / Directions Comments    glipiZIDE 10 MG 24 hr tablet   Commonly known as:  GLUCOTROL XL   This may have changed:    - medication strength  - how much to take   Used for:  Type 2 diabetes mellitus without complication, without long-term current use of insulin (H)        Dose:  10 mg   Start taking on:  8/25/2017   Take 1 tablet (10 mg) by mouth daily (with breakfast)   Quantity:  30 tablet   Refills:  0          CONTINUE these medications which have NOT CHANGED        Dose / Directions Comments    aspirin 81 MG EC tablet        Dose:  81 mg   Take 81 mg by mouth every evening   Refills:  0        atorvastatin 10 MG tablet   Commonly known as:  LIPITOR        Dose:  10 mg   Take 10 mg by mouth every morning   Refills:  0        cholecalciferol 1000 UNIT tablet   Commonly known as:  vitamin D        Dose:  1000 Units   Take 1,000 Units by mouth every evening   Refills:  0        CITALOPRAM HYDROBROMIDE PO        Dose:  20 mg   Take 20 mg by mouth daily   Refills:  0        cyanocobalamin 1000 MCG Tabs        Dose:  1000 mcg   Take 1,000 mcg by mouth every evening   Refills:  0        DONEPEZIL HCL PO        Dose:  10 mg   Take 10 mg by mouth daily   Refills:  0        FINASTERIDE PO        Dose:  5 mg   Take 5 mg by mouth daily   Refills:  0        irbesartan 300 MG tablet   Commonly known as:  AVAPRO        Dose:  300 mg   Take 300 mg by mouth daily.   Refills:  0        KEFLEX PO   Indication:  UTI Prophylaxis        Dose:  250 mg   Take 250 mg by mouth daily   Refills:  0         loperamide 2 MG capsule   Commonly known as:  IMODIUM        Dose:  2 mg   Take 2 mg by mouth 2 times daily   Refills:  0        MAGNESIUM OXIDE PO   Indication:  Disorder with Low Magnesium Levels        Dose:  400 mg   Take 400 mg by mouth daily   Refills:  0        metolazone 2.5 MG tablet   Commonly known as:  ZAROXOLYN        Dose:  2.5 mg   Take 2.5 mg by mouth daily.   Refills:  0        miconazole 2 % cream   Commonly known as:  MICATIN   Indication:  Ringworm of the Body, Ringworm of Groin Area        Apply topically 2 times daily   Refills:  0        nystatin 493921 UNIT/GM Powd   Commonly known as:  MYCOSTATIN   Indication:  Skin Infection due to Candida Yeast        Dose:  1 g   Apply 1 g topically 2 times daily as needed   Refills:  0        potassium chloride 10 MEQ CR tablet   Commonly known as:  KLOR-CON        Dose:  10 mEq   Take 10 mEq by mouth 3 times daily   Refills:  0        traZODone 100 MG tablet   Commonly known as:  DESYREL        Dose:  100 mg   Take 100 mg by mouth nightly as needed for sleep   Refills:  0                After Care     Activity - Up with nursing assistance           Additional Discharge Instructions       1. Follow up with PCP in a week to discuss hospitalization, discuss need to further uptitrate diabetes meds, and repeat BMP   2. Glipizide increased to 10 mg by mouth daily, if blood glucose remains elevated, consider initiation of metformin  3. Adequate oral fluid intake encouraged       Advance Diet as Tolerated       Follow this diet upon discharge: Moderate Consistent CHO Diet       Daily weights       Call Provider for weight gain of more than 2 pounds per day or 5 pounds per week.       Encourage PO fluids           Fall precautions           Sequeira catheter       To straight gravity drainage. Change catheter every 2 weeks and PRN for leaking or decreased uring output with signs of bladder distention. DO NOT change catheter without a specific MD order IF diagnosis  "of benign prostatic hypertrophy (BPH), neurogenic bladder, or other urological conditions       General info for SNF       Length of Stay Estimate: Short Term Care: Estimated # of Days <30  Condition at Discharge: Stable  Level of care:skilled   Rehabilitation Potential: Fair  Admission H&P remains valid and up-to-date: Yes  Recent Chemotherapy: N/A  Use Nursing Home Standing Orders: Yes       Glucose monitor nursing POCT       Before meals and at bedtime       Hip precautions           Intake and output       Every shift       Mantoux instructions       Give two-step Mantoux (PPD) Per Facility Policy Yes             Referrals     Nutrition Services Adult IP Consult       Reason:  Poor oral fluid intake. Diabetic.       Occupational Therapy Adult Consult       Evaluate and treat as clinically indicated.    Reason:  Physical deconditioning       Physical Therapy Adult Consult       Evaluate and treat as clinically indicated.    Reason:  Physical deconditioning             Follow-Up Appointment Instructions     Follow Up and recommended labs and tests       Follow up with intermediate physician.  The following labs/tests are recommended: glucose, BMP.             Statement of Approval     Ordered          08/24/17 1458  I have reviewed and agree with all the recommendations and orders detailed in this document.  EFFECTIVE NOW     Approved and electronically signed by:  Shefali Barajas PA-C                                                 INTERAGENCY TRANSFER FORM - NURSING   8/23/2017                       [place] [address] MEDICAL SPECIALTY UNIT: [phone]            Attending Provider: Morgan Banegas MD     Allergies:  No Known Allergies    Infection:  None   Service:  INTERNAL MED    Ht:  1.626 m (5' 4\")   Wt:  115.8 kg (255 lb 6.4 oz)   Admission Wt:  115.8 kg (255 lb 6.4 oz)    BMI:  43.84 kg/m 2   BSA:  2.29 m 2            Advance Directives        Does patient have a scanned Advance " "Directive/ACP document in EPIC?           Yes        Immunizations     None      ASSESSMENT     Discharge Profile Flowsheet     DISCHARGE NEEDS ASSESSMENT     Patient's communication style  spoken language (English or Bilingual) 06/23/17 2112    Concerns To Be Addressed  discharge planning concerns;cognitive/perceptual concerns 02/27/15 1617   FINAL RESOURCES      Transportation Available  family or friend will provide 03/01/15 1352   Existing Resources/Services  None 02/27/15 1617    Equipment Used at Home  bath bench;grab bar;raised toilet;walker, rolling 02/27/15 1617   SKIN      GASTROINTESTINAL (ADULT,PEDIATRIC,OB)     Inspection of bony prominences  Full 08/24/17 1043    GI WDL  ex 08/24/17 1043   Skin WDL  ex 08/24/17 1043    Abdominal Appearance  obese 08/24/17 1043   Skin Integrity  bruise(s);rash(es) 08/24/17 1043    Last Bowel Movement  08/24/17 08/24/17 1043   SAFETY      Passing flatus  yes 08/24/17 1043   Safety WDL  WDL 08/24/17 1003    COMMUNICATION ASSESSMENT                        Assessment WDL (Within Defined Limits) Definitions           Safety WDL     Effective: 09/28/15    Row Information: <b>WDL Definition:</b> Bed in low position, wheels locked; call light in reach; upper side rails up x 2; ID band on<br> <font color=\"gray\"><i>Item=AS safety wdl>>List=AS safety wdl>>Version=F14</i></font>      Skin WDL     Effective: 09/28/15    Row Information: <b>WDL Definition:</b> Warm; dry; intact; elastic; without discoloration; pressure points without redness<br> <font color=\"gray\"><i>Item=AS skin wdl>>List=AS skin wdl>>Version=F14</i></font>      Vitals     Vital Signs Flowsheet     VITAL SIGNS     Height  1.626 m (5' 4\") 08/23/17 1849    Temp  98  F (36.7  C) 08/24/17 0756   Weight  115.8 kg (255 lb 6.4 oz) 08/23/17 1849    Temp src  Oral 08/24/17 0756   Weight Method  Bed scale 08/23/17 1849    Resp  18 08/24/17 0756   BSA (Calculated - sq m)  2.29 08/23/17 1849    Pulse  81 08/24/17 0756   BMI " (Calculated)  43.93 08/23/17 1849    Pulse/Heart Rate Source  Monitor 08/24/17 0756   POSITIONING      BP  151/77 08/24/17 0756   Body Position  supine, head elevated 08/24/17 1043    BP Location  Right arm 08/24/17 0756   Head of Bed (HOB)  HOB at 30-45 degrees 08/24/17 0103    OXYGEN THERAPY     Positioning/Transfer Devices  pillows;in use 08/24/17 1003    SpO2  96 % 08/24/17 0756   Chair  Upright in chair 08/24/17 1003    O2 Device  None (Room air) 08/24/17 0756   DAILY CARE      PAIN/COMFORT     Activity Type  ambulated to bathroom 08/24/17 1116    Patient Currently in Pain  denies 08/24/17 0103   Activity Level of Assistance  assistance, 1 person 08/24/17 1116    HEIGHT AND WEIGHT     Activity Assistive Device  gait belt;walker 08/24/17 1116            Patient Lines/Drains/Airways Status    Active LINES/DRAINS/AIRWAYS     Name: Placement date: Placement time: Site: Days: Last dressing change:    Urethral Catheter Triple-lumen 20 fr 03/02/15   0800   Triple-lumen   906     Urethral Catheter Coude 14 fr 07/10/16   1053   Coude   410     Urethral Catheter Coude 18 fr 06/23/17   2329   Coude   61     Urethral Catheter 08/23/17 2031      less than 1     Peripheral IV 08/23/17 08/23/17 2029      less than 1     Wound Left Heel dry patch       Heel   92995     Rash Left lower leg        42512     Rash 02/26/15 Bilateral 02/26/15       910     Rash 08/23/17 2135 groin 08/23/17 2135    less than 1             Patient Lines/Drains/Airways Status    Active PICC/CVC     None            Intake/Output Detail Report     Date Intake     Output Net    Shift P.O. I.V. IV Piggyback Total Urine Total       Day 08/23/17 0700 - 08/23/17 1459 -- -- -- -- -- -- 0    Bhargavi 08/23/17 1500 - 08/23/17 2259 240 -- -- 240 500 500 -260    Noc 08/23/17 2300 - 08/24/17 0659 -- -- -- -- 300 300 -300    Day 08/24/17 0700 - 08/24/17 1459 360 -- -- 360 400 400 -40    Bhargavi 08/24/17 1500 - 08/24/17 2259 -- -- -- -- -- -- 0      Last Void/BM        Most Recent Value    Urine Occurrence     Stool Occurrence 1 at 08/24/2017 1116      Case Management/Discharge Planning     Case Management/Discharge Planning Flowsheet     REFERRAL INFORMATION     Transportation Available  family or friend will provide 03/01/15 1352    Arrived From  emergency department (came from [facility]) 03/03/15 0940   Skilled Nursing Facility  [facility] 12/12/11 1204    LIVING ENVIRONMENT     Skilled Nursing Facility Phone Number  [phone] 12/12/11 1204    Lives With  spouse 08/23/17 1927   Equipment Used at Home  bath bench;grab bar;raised toilet;walker, rolling 02/27/15 1617    Living Arrangements  house 02/27/15 1617   FINAL RESOURCES      Provides Primary Care For  no one 12/08/11 1211   Existing Resources/Services  None 02/27/15 1617    COPING/STRESS     ABUSE RISK SCREEN      Major Change/Loss/Stressor  hospitalization 08/23/17 2009   QUESTION TO PATIENT:  Has a member of your family or a partner(now or in the past) intimidated, hurt, manipulated, or controlled you in any way?  no 08/23/17 2009    Patient Personal Strengths  expressive of needs 12/08/11 1211   QUESTION TO PATIENT: Do you feel safe going back to the place where you are living?  yes 08/23/17 2009    ASSESSMENT/CONCERNS TO BE ADDRESSED     OBSERVATION: Is there reason to believe there has been maltreatment of a vulnerable adult (ie. Physical/Sexual/Emotional abuse, self neglect, lack of adequate food, shelter, medical care, or financial exploitation)?  no 08/23/17 2009    Concerns To Be Addressed  discharge planning concerns;cognitive/perceptual concerns 02/27/15 1617   (R) MENTAL HEALTH SUICIDE RISK      DISCHARGE PLANNING     Are you depressed or being treated for depression?  No 08/23/17 2009                  [place] [address] MEDICAL SPECIALTY UNIT: [phone]            Medication Administration Report for Jose Luiszandra Chavez Royer as of 08/24/17 1508   Legend:    Given Hold Not Given Due Canceled Entry Other  Actions    Time Time (Time) Time  Time-Action       Inactive    Active    Linked        Medications 08/18/17 08/19/17 08/20/17 08/21/17 08/22/17 08/23/17 08/24/17    acetaminophen (TYLENOL) Suppository 650 mg  Dose: 650 mg Freq: EVERY 4 HOURS PRN Route: RE  PRN Reason: mild pain  Start: 08/23/17 1832   Admin Instructions: Alternate ibuprofen (if ordered) with acetaminophen.  Maximum acetaminophen dose from all sources = 75 mg/kg/day not to exceed 4 grams/day.               acetaminophen (TYLENOL) tablet 650 mg  Dose: 650 mg Freq: EVERY 4 HOURS PRN Route: PO  PRN Reason: mild pain  Start: 08/23/17 1832   Admin Instructions: Alternate ibuprofen (if ordered) with acetaminophen.  Maximum acetaminophen dose from all sources = 75 mg/kg/day not to exceed 4 grams/day.               cephalexin (KEFLEX) capsule 250 mg  Dose: 250 mg Freq: DAILY Route: PO  Indications Comment: UTI Prophylaxis  Start: 08/24/17 0900          0916 (250 mg)-Given           citalopram (celeXA) tablet 20 mg  Dose: 20 mg Freq: DAILY Route: PO  Start: 08/24/17 0900          0916 (20 mg)-Given           donepezil (ARICEPT) tablet 10 mg  Dose: 10 mg Freq: DAILY Route: PO  Start: 08/24/17 0900          0916 (10 mg)-Given           finasteride (PROSCAR) tablet 5 mg  Dose: 5 mg Freq: DAILY Route: PO  Start: 08/24/17 0900   Admin Instructions: *Do not handle tablets if you are pregnant*           0916 (5 mg)-Given           glucose 40 % gel 15-30 g  Dose: 15-30 g Freq: EVERY 15 MIN PRN Route: PO  PRN Reason: low blood sugar  Start: 08/23/17 1832   Admin Instructions: Give 15 g for BG 51 to 69 mg/dL IF patient is conscious and able to swallow. Give 30 g for BG less than or equal to 50 mg/dL IF patient is conscious and able to swallow. Do NOT give glucose gel via enteral tube.  IF patient has enteral tube: give apple juice 120 mL (4 oz or 15 g of CHO) via enteral tube for BG 51 to 69 mg/dL.  Give apple juice 240 mL (8 oz or 30 g of CHO) via enteral tube for BG  less than or equal to 50 mg/dL.    ~Oral gel is preferable for conscious and able to swallow patient.   ~IF gel unavailable or patient refuses may provide apple juice 120 mL (4 oz or 15 g of CHO). Document juice on I and O flowsheet.              Or  dextrose 50 % injection 25-50 mL  Dose: 25-50 mL Freq: EVERY 15 MIN PRN Route: IV  PRN Reason: low blood sugar  Start: 08/23/17 1832   Admin Instructions: Use if have IV access, BG less than 70 mg/dL and meet dose criteria below:  Dose if conscious and alert (or disorientated) and NPO = 25 mL  Dose if unconscious / not alert = 50 mL  Vesicant.              Or  glucagon injection 1 mg  Dose: 1 mg Freq: EVERY 15 MIN PRN Route: SC  PRN Reason: low blood sugar  PRN Comment: May repeat x 1 only  Start: 08/23/17 1832   Admin Instructions: May give SQ or IM. ONLY use glucagon IF patient has NO IV access AND is UNABLE to swallow AND blood glucose is LESS than or EQUAL to 50 mg/dL.               insulin aspart (NovoLOG) inj (RAPID ACTING)  Dose: 1-10 Units Freq: 3 TIMES DAILY BEFORE MEALS Route: SC  Start: 08/24/17 1215   Admin Instructions: Correction Scale - HIGH INSULIN RESISTANCE DOSING     Do Not give Correction Insulin if Pre-Meal BG less than 140.   For Pre-Meal  - 164 give 1 unit.   For Pre-Meal  - 189 give 2 units.   For Pre-Meal  - 214 give 3 units.   For Pre-Meal  - 239 give 4 units.   For Pre-Meal  - 264 give 5 units.   For Pre-Meal  - 289 give 6 units.   For Pre-Meal  - 314 give 7 units.   For Pre-Meal  - 339 give 8 units.   For Pre-Meal  - 364 give 9 units.   For Pre-Meal BG greater than or equal to 365 give 10 units  To be given with prandial insulin, and based on pre-meal blood glucose.   Notify provider if glucose greater than or equal to 350 mg/dL after administration of correction dose.  If given at mealtime, must be administered 5 min before meal or immediately after.           1223 (8 Units)-Given       [  "] 1700           irbesartan (AVAPRO) tablet 300 mg  Dose: 300 mg Freq: DAILY Route: PO  Start: 08/24/17 0900          0916 (300 mg)-Given           lidocaine (LMX4) cream  Freq: EVERY 1 HOUR PRN Route: Top  PRN Reason: pain  PRN Comment: with VAD insertion or accessing implanted port.  Start: 08/23/17 1831   Admin Instructions: Do NOT give if patient has a history of allergy to any local anesthetic or any \"jenny\" product.  Apply 30 minutes prior to VAD insertion or port access. MAX Dose: 2.5 g (  of 5 g tube).               lidocaine 1 % 1 mL  Dose: 1 mL Freq: EVERY 1 HOUR PRN Route: OTHER  PRN Comment: mild pain with VAD insertion or accessing implanted port  Start: 08/23/17 1831   Admin Instructions: Do NOT give if patient has a history of allergy to any local anesthetic or any \"jenny\" product. MAX dose 1 mL subcutaneous OR intradermal in divided doses.               miconazole (MICATIN; MICRO GUARD) 2 % powder  Freq: EVERY 1 HOUR PRN Route: Top  PRN Reason: other  PRN Comment: topical candidiasis  Start: 08/23/17 2137   Admin Instructions: Apply to affected area.                 naloxone (NARCAN) injection 0.1-0.4 mg  Dose: 0.1-0.4 mg Freq: EVERY 2 MIN PRN Route: IV  PRN Reason: opioid reversal  Start: 08/23/17 1829   Admin Instructions: For respiratory rate LESS than or EQUAL to 8.  Partial reversal dose:  0.1 mg titrated q 2 minutes for Analgesia Side Effects Monitoring Sedation Level of 3 (frequently drowsy, arousable, drifts to sleep during conversation).Full reversal dose:  0.4 mg bolus for Analgesia Side Effects Monitoring Sedation Level of 4 (somnolent, minimal or no response to stimulation).               ondansetron (ZOFRAN-ODT) ODT tab 4 mg  Dose: 4 mg Freq: EVERY 6 HOURS PRN Route: PO  PRN Reasons: nausea,vomiting  Start: 08/23/17 1832   Admin Instructions: This is Step 1 of nausea and vomiting management.  If nausea not resolved in 15 minutes, go to Step 2 prochlorperazine (COMPAZINE). Do not push " through foil backing. Peel back foil and gently remove. Place on tongue immediately. Administration with liquid unnecessary              Or  ondansetron (ZOFRAN) injection 4 mg  Dose: 4 mg Freq: EVERY 6 HOURS PRN Route: IV  PRN Reasons: nausea,vomiting  Start: 08/23/17 1832   Admin Instructions: This is Step 1 of nausea and vomiting management.  If nausea not resolved in 15 minutes, go to Step 2 prochlorperazine (COMPAZINE).  Irritant.               polyethylene glycol (MIRALAX/GLYCOLAX) Packet 17 g  Dose: 17 g Freq: DAILY PRN Route: PO  PRN Reason: constipation  Start: 08/23/17 1832   Admin Instructions: Give in 8oz of  water, juice, or soda. Hold for loose stools.  This is the second step of a three step constipation treatment protocol.  1 Packet = 17 grams. Mixed prescribed dose in 8 ounces of water. Follow with 8 oz. of water.               potassium chloride (KLOR-CON) Packet 20-40 mEq  Dose: 20-40 mEq Freq: EVERY 2 HOURS PRN Route: ORAL OR FEED  PRN Reason: potassium supplementation  Start: 08/23/17 2027   Admin Instructions: Use if unable to tolerate tablets.  If Serum K+ 3.0-3.3, dose = 60 mEq po total dose (40 mEq x1 followed in 2 hours by 20 mEq x1). Recheck K+ level 4 hours after dose and the next AM.  If Serum K+ 2.5-2.9, dose = 80 mEq po total dose (40 mEq Q2H x2). Recheck K+ level 4 hours after dose and the next AM.  If Serum K+ less than 2.5, See IV order.  Dissolve packet contents in 4-8 ounces of cold water or juice.               potassium chloride 10 mEq in 100 mL intermittent infusion  Dose: 10 mEq Freq: EVERY 1 HOUR PRN Route: IV  PRN Reason: potassium supplementation  Start: 08/23/17 2027   Admin Instructions: Infuse via PERIPHERAL LINE or CENTRAL LINE. Use for central line replacement if patient weight less than 65 kg, if patient is on TPN with high potassium content or if unit does not stock 20 mEq bags.   If Serum K+ 3.0-3.3, dose = 10 mEq/hr x4 doses (40 mEq IV total dose). Recheck K+ level  2 hours after dose and the next AM.   If Serum K+ less than 3.0, dose = 10 mEq/hr x6 doses (60 mEq IV total dose). Recheck K+ level 2 hours after dose and the next AM.               potassium chloride 10 mEq in 100 mL intermittent infusion with 10 mg lidocaine  Dose: 10 mEq Freq: EVERY 1 HOUR PRN Route: IV  PRN Reason: potassium supplementation  Start: 08/23/17 2027   Admin Instructions: Infuse via PERIPHERAL LINE. Use potassium with lidocaine for pain with peripheral administration.  If Serum K+ 3.0-3.3, dose = 10 mEq/hr x4 doses (40 mEq IV total dose). Recheck K+ level 2 hours after dose and the next AM.  If Serum K+ less than 3.0, dose = 10 mEq/hr x6 doses (60 mEq IV total dose). Recheck K+ level 2 hours after dose and the next AM.               potassium chloride 20 mEq in 50 mL intermittent infusion  Dose: 20 mEq Freq: EVERY 1 HOUR PRN Route: IV  PRN Reason: potassium supplementation  Start: 08/23/17 2027   Admin Instructions: Infuse via CENTRAL LINE Only. May need EKG if less than 65 kg or on TPN - Max rate is 0.3 mEq/kg/hr for patients not on EKG monitoring.   If Serum K+ 3.0-3.3, dose = 20 mEq/hr x2 doses (40 mEq IV total dose). Recheck K+ level 2 hours after dose and the next AM.  If Serum K+ less than 3.0, dose = 20 mEq/hr x3 doses (60 mEq IV total dose). Recheck K+ level 2 hours after dose and the next AM.               potassium chloride SA (K-DUR/KLOR-CON M) CR tablet 20-40 mEq  Dose: 20-40 mEq Freq: EVERY 2 HOURS PRN Route: PO  PRN Reason: potassium supplementation  Start: 08/23/17 2027   Admin Instructions: Use if able to take PO.   If Serum K+ 3.0-3.3, dose = 60 mEq po total dose (40 mEq x1 followed in 2 hours by 20 mEq x1). Recheck K+ level 4 hours after dose and the next AM.  If Serum K+ 2.5-2.9, dose = 80 mEq po total dose (40 mEq Q2H x2). Recheck K+ level 4 hours after dose and the next AM.  If Serum K+ less than 2.5, See IV order.  DO NOT CRUSH          2208 (40 mEq)-Given        0054 (40  mEq)-Given       0642 (40 mEq)-Given       0815 (20 mEq)-Given           prochlorperazine (COMPAZINE) injection 5 mg  Dose: 5 mg Freq: EVERY 6 HOURS PRN Route: IV  PRN Reasons: nausea,vomiting  Start: 08/23/17 1832   Admin Instructions: This is Step 2 of nausea and vomiting management.   If nausea not resolved in 15 minutes, give metoclopramide (REGLAN) if ordered (step 3 of nausea and vomiting management)              Or  prochlorperazine (COMPAZINE) tablet 5 mg  Dose: 5 mg Freq: EVERY 6 HOURS PRN Route: PO  PRN Reason: vomiting  Start: 08/23/17 1832   Admin Instructions: This is Step 2 of nausea and vomiting management.   If nausea not resolved in 15 minutes, give metoclopramide (REGLAN) if ordered (step 3 of nausea and vomiting management)              Or  prochlorperazine (COMPAZINE) Suppository 12.5 mg  Dose: 12.5 mg Freq: EVERY 12 HOURS PRN Route: RE  PRN Reasons: nausea,vomiting  Start: 08/23/17 1832   Admin Instructions: This is Step 2 of nausea and vomiting management.   If nausea not resolved in 15 minutes, give metoclopramide (REGLAN) if ordered (step 3 of nausea and vomiting management)               senna-docusate (SENOKOT-S;PERICOLACE) 8.6-50 MG per tablet 1-2 tablet  Dose: 1-2 tablet Freq: 2 TIMES DAILY PRN Route: PO  PRN Comment: constipation   Start: 08/23/17 1832   Admin Instructions: If no bowel movement in 24 hours, increase to 2 tablets PO BID.  Hold for loose stools.   This is the first step of a three step constipation treatment protocol.               sodium chloride (PF) 0.9% PF flush 3 mL  Dose: 3 mL Freq: EVERY 8 HOURS Route: IK  Start: 08/23/17 1845   Admin Instructions: And Q1H PRN, to lock peripheral IV dormant line.          (2019)-Not Given        0247 (3 mL)-Given       1225 (3 mL)-Given       [ ] 1845           sodium chloride (PF) 0.9% PF flush 3 mL  Dose: 3 mL Freq: EVERY 1 HOUR PRN Route: IK  PRN Reason: line flush  Start: 08/23/17 1831   Admin Instructions: for peripheral IV  flush post IV meds               traZODone (DESYREL) tablet 100 mg  Dose: 100 mg Freq: AT BEDTIME PRN Route: PO  PRN Reason: sleep  Start: 08/23/17 2046          0255 (100 mg)-Given          Future Medications  Medications 08/18/17 08/19/17 08/20/17 08/21/17 08/22/17 08/23/17 08/24/17       glipiZIDE (GLUCOTROL XL) 24 hr tablet 10 mg  Dose: 10 mg Freq: DAILY WITH BREAKFAST Route: PO  Start: 08/25/17 0900              insulin aspart (NovoLOG) inj (RAPID ACTING)  Dose: 1-7 Units Freq: AT BEDTIME Route: SC  Start: 08/24/17 2200   Admin Instructions: HIGH INSULIN RESISTANCE DOSING    Do Not give Bedtime Correction Insulin if BG less than 200.   For  - 224 give 1 units.   For  - 249 give 2 units.   For  - 274 give 3 units.   For  - 299 give 4 units.   For  - 324 give 5 units.   For  - 349 give 6 units.   For BG greater than or equal to 350 give 7 units.   Notify provider if glucose greater than or equal to 350 mg/dL after administration of correction dose.  If given at mealtime, must be administered 5 min before meal or immediately after.           [ ] 2200          Discontinued Medications  Medications 08/18/17 08/19/17 08/20/17 08/21/17 08/22/17 08/23/17 08/24/17         Dose: 5 mg Freq: DAILY WITH BREAKFAST Route: PO  Start: 08/24/17 0900   End: 08/24/17 1302          0916 (5 mg)-Given       1302-Med Discontinued         Dose: 1-5 Units Freq: AT BEDTIME Route: SC  Start: 08/23/17 2200   End: 08/24/17 1213   Admin Instructions: MEDIUM INSULIN RESISTANCE DOSING    Do Not give Bedtime Correction Insulin if BG less than  200.   For  - 249 give 1 units.   For  - 299 give 2 units.   For  - 349 give 3 units.   For  -399 give 4 units.   For BG greater than or equal to 400 give 5 units.  Notify provider if glucose greater than or equal to 350 mg/dL after administration of correction dose.  If given at mealtime, must be administered 5 min before meal or immediately  after.          2208 (3 Units)-Given [C]        1213-Med Discontinued         Dose: 1-7 Units Freq: 3 TIMES DAILY BEFORE MEALS Route: SC  Start: 08/23/17 1845   End: 08/24/17 1213   Admin Instructions: Correction Scale - MEDIUM INSULIN RESISTANCE DOSING     Do Not give Correction Insulin if Pre-Meal BG less than 140.   For Pre-Meal  - 189 give 1 unit.   For Pre-Meal  - 239 give 2 units.   For Pre-Meal  - 289 give 3 units.   For Pre-Meal  - 339 give 4 units.   For Pre-Meal - 399 give 5 units.   For Pre-Meal -449 give 6 units  For Pre-Meal BG greater than or equal to 450 give 7 units.   To be given with prandial insulin, and based on pre-meal blood glucose.    Notify provider if glucose greater than or equal to 350 mg/dL after administration of correction dose.  If given at mealtime, must be administered 5 min before meal or immediately after.          (2017)-Not Given [C]        0922 (4 Units)-Given       1213-Med Discontinued               Dose: 10 mEq Freq: 3 TIMES DAILY Route: PO  Start: 08/23/17 2200   End: 08/24/17 1023   Admin Instructions: DO NOT CRUSH.          2207 (10 mEq)-Given        0916 (10 mEq)-Given       1023-Med Discontinued                INTERAGENCY TRANSFER FORM - NOTES (H&P, Discharge Summary, Consults, Procedures, Therapies)   8/23/2017                       [place] [address] [facility]: [phone]            History & Physicals     No notes of this type exist for this encounter.         Discharge Summaries      Discharge Summaries by Shefali Barajas PA-C at 8/24/2017  1:08 PM     Author:  Shefali Barajas PA-C Service:  Hospitalist Author Type:  Physician Assistant - C    Filed:  8/24/2017  3:07 PM Date of Service:  8/24/2017  1:08 PM Creation Time:  8/24/2017  1:04 PM    Status:  Cosign Needed :  Shefali Barajas PA-C (Physician Assistant - C)    Cosign Required:  Yes             [facility]    Discharge Summary  Hospitalist    Date of Admission:  8/23/2017  Date of Discharge:  8/24/2017  Discharging Provider: Shefali Barajas PA-C  Date of Service (when I saw the patient): 08/24/17    Discharge Diagnoses   Generalized weakness, recurrent falls[KE1.1]   Hypokalemia, resolved   Hypomagnesemia, resolved   T2DM, non-insulin dependent, uncontrolled[KE1.2]     History of Present Illness   Chavez Mccain is a 76-year-old male with past medical history of benign prostatic hypertrophy with chronic urinary retention with Sequeira placement, obstructive sleep apnea using CPAP, diabetes mellitus type 2, hypertension, dyslipidemia as well as dementia, who presented as a direct admission from [facility] due to recent hx of increased generalized weakness, frequent falls, and wife's inability to care for him at home.  Admitted under observation status. Vitals currently WNL. Pt currently stable. See H&P by Vance Treadwell PA-C from 8/23/17 for complete details.     Hospital Course   Chavez Mccain was admitted on 8/23/2017.  The following problems were addressed during his hospitalization:    Generalized weakness with frequent falls:  The patient recently spent 2 weeks at an assisted living facility where he was nonambulatory and therefore declined in terms of his functional status.  Secondary to this, having more profound generalized weakness and frequent falls subsequent to this. Workup at outlying facility including CMP, CBC, UA, CT head, and CXR was unrevealing aside from hypokalemia and hypomagnesemia which is being repleted.   -- XR left ankle given acute left-sided lateral malleolus pain subsequent to fall[KE1.1]; no acute findings per Radiology[KE1.2]; likely sprain--rest, ice, elevation, compression[KE1.3]   -- PT consulted, recommending TCU at discharge   -- SW to assist with discharge planning; goal to discharge to [facility][KE1.1]; plan for discharge at  1600[KE1.2]     Hyopkalemia, resolved  Hypomagnesemia, resolved: Potassium 2.9>3.2>3.7. Magnesium 1.1>1.9.  -- Pt does take 10 mEq TID PTA and Magnesium supplementation as well; this should be continued at discharge   -- PCP to recheck BMP in the next week      Benign prostatic hypertrophy with chronic urinary retention and indwelling Sequeira catheter:  Urinalysis at [facility] was without pyuria or indication of infection.    -- Continue indwelling Sequeira as per home regimen.    -- Continue prior to admission finasteride   -- Continue PTA Keflex for which he takes for UTI prophylaxis       Obstructive sleep apnea:  Continue CPAP      Diabetes mellitus type 2, non-insulin dependent, uncontrolled:  A1C 8.3. Maintained on Glipizide XL 5 mg po qd with breakfast.   -- Glipizide dosing increased from 5 mg to 10 mg po qd; follow glucose closely, if remains elevated, could consider initiation of metformin for tighter BP control   -- Mod CHO diet[KE1.1]       Recent Labs  Lab 08/24/17  1207 08/24/17  0758 08/24/17  0512 08/24/17  0245 08/23/17  2202 08/23/17  1920   GLC  --   --  257*  --   --  258*   * 299*  --  248* 333*  --[KE1.4]      Hypertension:  Continue prior to admission irbesartan. Resume Metolazone at discharge.      Dementia:  Continue prior to admission donepezil.      Hyperlipidemia:  Resume Lipitor at discharge      Depression: Resume PTA Celexa at discharge     Shefali Barajas PA-C    This patient was discussed with Dr. Iglesias of the Hospitalist Service who agrees with current plans as outlined above.     Significant Results and Procedures   See below     Pending Results   These results will be followed up by PCP  Unresulted Labs Ordered in the Past 30 Days of this Admission     No orders found for last 61 day(s).          Code Status   Full Code       Primary Care Physician   Vasile Brooks        Discharge Disposition   Discharged to short-term care facility  Condition at discharge:  Stable    Consultations This Hospital Stay   SOCIAL WORK IP CONSULT  CARE COORDINATOR IP CONSULT  PHYSICAL THERAPY ADULT IP CONSULT  NUTRITION SERVICES ADULT IP CONSULT  PHYSICAL THERAPY ADULT IP CONSULT  OCCUPATIONAL THERAPY ADULT IP CONSULT    Time Spent on this Encounter   I, Shefali Barajas, personally saw the patient today and spent greater than 30 minutes discharging this patient.    Discharge Orders     General info for SNF   Length of Stay Estimate: Short Term Care: Estimated # of Days <30  Condition at Discharge: Stable  Level of care:skilled   Rehabilitation Potential: Fair  Admission H&P remains valid and up-to-date: Yes  Recent Chemotherapy: N/A  Use Nursing Home Standing Orders: Yes     Mantoux instructions   Give two-step Mantoux (PPD) Per Facility Policy Yes     Reason for your hospital stay   You were hospitalized for further evaluation and treatment of generalized weakness and fall.     Glucose monitor nursing POCT   Before meals and at bedtime     Intake and output   Every shift     Daily weights   Call Provider for weight gain of more than 2 pounds per day or 5 pounds per week.     Sequeira catheter   To straight gravity drainage. Change catheter every 2 weeks and PRN for leaking or decreased uring output with signs of bladder distention. DO NOT change catheter without a specific MD order IF diagnosis of benign prostatic hypertrophy (BPH), neurogenic bladder, or other urological conditions     Follow Up and recommended labs and tests   Follow up with alf physician.  The following labs/tests are recommended: glucose, BMP.     Activity - Up with nursing assistance     Encourage PO fluids     Additional Discharge Instructions   1. Follow up with PCP in a week to discuss hospitalization, discuss need to further uptitrate diabetes meds, and repeat BMP   2. Glipizide increased to 10 mg by mouth daily, if blood glucose remains elevated, consider initiation of metformin  3. Adequate oral  fluid intake encouraged     Nutrition Services Adult IP Consult   Reason:  Poor oral fluid intake. Diabetic.     Physical Therapy Adult Consult   Evaluate and treat as clinically indicated.    Reason:  Physical deconditioning     Occupational Therapy Adult Consult   Evaluate and treat as clinically indicated.    Reason:  Physical deconditioning     Fall precautions     Hip precautions     Advance Diet as Tolerated   Follow this diet upon discharge: Moderate Consistent CHO Diet       Discharge Medications   Current Discharge Medication List      CONTINUE these medications which have CHANGED    Details   glipiZIDE (GLUCOTROL XL) 10 MG 24 hr tablet Take 1 tablet (10 mg) by mouth daily (with breakfast)  Qty: 30 tablet    Associated Diagnoses: Type 2 diabetes mellitus without complication, without long-term current use of insulin (H)         CONTINUE these medications which have NOT CHANGED    Details   miconazole (MICATIN) 2 % cream Apply topically 2 times daily      CITALOPRAM HYDROBROMIDE PO Take 20 mg by mouth daily      FINASTERIDE PO Take 5 mg by mouth daily      Cephalexin (KEFLEX PO) Take 250 mg by mouth daily      MAGNESIUM OXIDE PO Take 400 mg by mouth daily      nystatin (MYCOSTATIN) 678866 UNIT/GM POWD Apply 1 g topically 2 times daily as needed      DONEPEZIL HCL PO Take 10 mg by mouth daily      loperamide (IMODIUM) 2 MG capsule Take 2 mg by mouth 2 times daily      aspirin 81 MG EC tablet Take 81 mg by mouth every evening       Cholecalciferol (VITAMIN D3) 1000 UNIT TABS Take 1,000 Units by mouth every evening       Cyanocobalamin 1000 MCG TABS Take 1,000 mcg by mouth every evening       atorvastatin (LIPITOR) 10 MG tablet Take 10 mg by mouth every morning       irbesartan (AVAPRO) 300 MG tablet Take 300 mg by mouth daily.      MetOLAZONE (ZAROXOLYN) 2.5 MG tablet Take 2.5 mg by mouth daily.      potassium chloride (KLOR-CON) 10 MEQ CR tablet Take 10 mEq by mouth 3 times daily       TRAZodone (DESYREL) 100  MG tablet Take 100 mg by mouth nightly as needed for sleep            Allergies   No Known Allergies  Data   Most Recent 3 CBC's:  Recent Labs   Lab Test  07/10/16   1123  03/03/15   0803  03/02/15   0800   WBC  9.3  9.2  12.9*   HGB  12.9*  14.2  14.9   MCV  93  94  92   PLT  199  211  248      Most Recent 3 BMP's:  Recent Labs   Lab Test  08/24/17   1235  08/24/17   0512  08/23/17   1920  07/10/16   1123   NA   --   137  135  139   POTASSIUM  3.7  3.2*  2.9*  4.3   CHLORIDE   --   101  98  107   CO2   --   28  28  20   BUN   --   15  16  29   CR   --   0.99  0.91  1.14   ANIONGAP   --   8  9  12   CLAUDIA   --   8.7  8.9  9.2   GLC   --   257*  258*  262*     Most Recent 2 LFT's:  Recent Labs   Lab Test  02/27/15   0635  12/06/11 2010   AST  25  18   ALT  52  27   ALKPHOS  71  111   BILITOTAL  0.4  0.7     Most Recent INR's and Anticoagulation Dosing History:  Anticoagulation Dose History     Recent Dosing and Labs Latest Ref Rng & Units 11/29/2005 11/30/2005 12/1/2005 12/2/2005 12/6/2011 3/2/2015    INR 0.86 - 1.14 1.00 1.31(H) 1.52(H) 1.96(H) 1.17(H) 0.97        Most Recent 3 Troponin's:  Recent Labs   Lab Test  12/07/11   0715  12/06/11 2010   TROPI  0.058*  0.071*     Most Recent Cholesterol Panel:No lab results found.  Most Recent 6 Bacteria Isolates From Any Culture (See EPIC Reports for Culture Details):  Recent Labs   Lab Test  06/23/17   2329  07/10/16   1055  09/02/15   1658  03/02/15   1145  02/26/15   1127  01/14/12   0140   CULT  50,000 to 100,000 colonies/mL Citrobacter freundii complex*  No growth  >100,000 colonies/mL Klebsiella pneumoniae*  No growth  >100,000 colonies/mL Klebsiella pneumoniae  >100,000 colonies/mL Staphylococcus aureus  10,000 to 50,000 colonies/mL Strain 2 Klebsiella pneumoniae  *  >100,000 colonies/mL Proteus mirabilis faxed to [phone] so 1.16.2012 1039     Most Recent TSH, T4 and A1c Labs:  Recent Labs   Lab Test  08/24/17   0512   A1C  8.3*     Results for orders placed  or performed during the hospital encounter of 02/26/15   CT Head w/o Contrast    Narrative    CT OF THE HEAD WITHOUT CONTRAST February 26, 2015 11:55 AM     HISTORY: Weakness right leg greater than left leg, upper extremity  weakness.    TECHNIQUE: 5 mm thick axial CT images of the head were acquired  without IV contrast material.    COMPARISON: None.    FINDINGS: There is moderate diffuse cerebral volume loss. There are  subtle patchy areas of decreased density in the cerebral white matter  bilaterally that are consistent with sequela of chronic small vessel  ischemic disease.     The ventricles and basal cisterns are within normal limits in  configuration given the degree of cerebral volume loss.  There is no  midline shift. There are no extra-axial fluid collections.     No intracranial hemorrhage, mass or recent infarct.    The visualized paranasal sinuses are well-aerated. Left mastoidectomy  changes are noted. The mastoid air cells on the right are well  aerated. There are no fractures of the visualized bones.       Impression    IMPRESSION: Diffuse cerebral volume loss and cerebral white matter  changes consistent with chronic small vessel ischemic disease. No  evidence for acute intracranial pathology.      MOLLY COLVIN MD[KE1.1]          Revision History        User Key Date/Time User Provider Type Action    > [N/A] 8/24/2017  3:07 PM Shefali Barajas PA-C Physician Assistant - [place]ICK[place] Sign     KE1.3 8/24/2017  3:06 PM Shefali Barajas PA-C Physician Assistant - [place] Sign     KE1.2 8/24/2017  3:05 PM Shefali Barajas PA-C Physician Assistant - C      KE1.4 8/24/2017  1:07 PM Shefali Barajas PA-C Physician Assistant - C      KE1.1 8/24/2017  1:04 PM Shefali Barajas PA-C Physician Assistant - C                      Consult Notes      Consults by Tung Emerson RN at 8/24/2017 11:52 AM     Author:  Tung Emerson RN Service:  Care Coordinator Author Type:   "    Filed:  8/24/2017 11:52 AM Date of Service:  8/24/2017 11:52 AM Creation Time:  8/24/2017 11:26 AM    Status:  Signed :  Tung Emerson RN ()     Consult Orders:    1. Care Coordinator IP Consult [[id number]] ordered by Vance Treadwell PA-C at 08/23/17 1833                Met with patient and spouse to discuss observation status and plan of care. Patient lives with spouse . Spouse stating  Patient has been falling at home and it is getting increasingly difficult for spouse to care for him. Patient receives home care through  [facility]. Spouse requested  [facility] till patient improves his strength. Patient and spouse is aware of  private pay  for TCU. They feel this is the best plan for patient. SW working on TCU  for poss dc today.[BV1.1]     Revision History        User Key Date/Time User Provider Type Action    > BV1.1 8/24/2017 11:52 AM Tung Emerson RN Case Manager Sign                  Progress Notes - Physician (Notes for yesterday and today)     No notes of this type exist for this encounter.      Procedure Notes     No notes of this type exist for this encounter.      Progress Notes - Therapies (Notes from 08/21/17 through 08/24/17)     No notes of this type exist for this encounter.                                          INTERAGENCY TRANSFER FORM - LAB / IMAGING / EKG / EMG RESULTS   8/23/2017                       [place] [address] [facility]: [phone]            Unresulted Labs (24h ago through future)    Start       Ordered    Unscheduled  Potassium  (Potassium Replacement - \"Standard\" - For K levels less than 3.4 mmol/L - UU,UR,UA,RH,SH,PH,WY )  CONDITIONAL (SPECIFY),   Routine     Comments:  Obtain Potassium Level for these conditions:  *IF no potassium result within 24 hours before initiation of order set, draw potassium level with next lab collect.    *2 HOURS AFTER last IV potassium replacement dose and 4 hours after an " oral replacement dose.  *Next morning after potassium dose.     Repeat Potassium Replacement if necessary.    08/23/17 2027         Lab Results - 3 Days      Potassium [[id number]]  Resulted: 08/24/17 1257, Result status: Final result    Ordering provider: Shefali Barajas PA-C  08/24/17 0825 Resulting lab: [facility]    Specimen Information    Type Source Collected On   Blood  08/24/17 1235          Components       Value Reference Range Flag Lab   Potassium 3.7 3.4 - 5.3 mmol/L  FrStHsLb            Glucose by meter [[id number]] (Abnormal)  Resulted: 08/24/17 1211, Result status: Final result    Ordering provider: Morgan Banegas MD  08/24/17 1207 Resulting lab: POINT OF CARE TEST, GLUCOSE    Specimen Information    Type Source Collected On     08/24/17 1207          Components       Value Reference Range Flag Lab   Glucose 335 70 - 99 mg/dL H 170            Glucose by meter [[id number]] (Abnormal)  Resulted: 08/24/17 0806, Result status: Final result    Ordering provider: Morgan Banegas MD  08/24/17 0758 Resulting lab: POINT OF CARE TEST, GLUCOSE    Specimen Information    Type Source Collected On     08/24/17 0758          Components       Value Reference Range Flag Lab   Glucose 299 70 - 99 mg/dL H 170            Hemoglobin A1c [[id number]] (Abnormal)  Resulted: 08/24/17 0555, Result status: Final result    Ordering provider: Vance Treadwell PA-C  08/24/17 0001 Resulting lab: [facility]    Specimen Information    Type Source Collected On   Blood  08/24/17 0512          Components       Value Reference Range Flag Lab   Hemoglobin A1C 8.3 4.3 - 6.0 % H FrStHsLb            Basic metabolic panel [[id number]] (Abnormal)  Resulted: 08/24/17 0545, Result status: Final result    Ordering provider: Vance Treadwell PA-C  08/24/17 0001 Resulting lab: [facility]    Specimen Information    Type Source Collected On   Blood  08/24/17 0512          Components        Value Reference Range Flag Lab   Sodium 137 133 - 144 mmol/L  FrStHsLb   Potassium 3.2 3.4 - 5.3 mmol/L L FrStHsLb   Chloride 101 94 - 109 mmol/L  FrStHsLb   Carbon Dioxide 28 20 - 32 mmol/L  FrStHsLb   Anion Gap 8 3 - 14 mmol/L  FrStHsLb   Glucose 257 70 - 99 mg/dL H FrStHsLb   Urea Nitrogen 15 7 - 30 mg/dL  FrStHsLb   Creatinine 0.99 0.66 - 1.25 mg/dL  FrStHsLb   GFR Estimate 73 >60 mL/min/1.7m2  FrStHsLb   Comment:  Non  GFR Calc   GFR Estimate If Black 89 >60 mL/min/1.7m2  FrStHsLb   Comment:  African American GFR Calc   Calcium 8.7 8.5 - 10.1 mg/dL  FrStHsLb            Glucose by meter [[id number]] (Abnormal)  Resulted: 08/24/17 0545, Result status: Final result    Ordering provider: Morgan Banegas MD  08/24/17 0245 Resulting lab: POINT OF CARE TEST, GLUCOSE    Specimen Information    Type Source Collected On     08/24/17 0245          Components       Value Reference Range Flag Lab   Glucose 248 70 - 99 mg/dL H 170            Glucose by meter [[id number]] (Abnormal)  Resulted: 08/23/17 2206, Result status: Final result    Ordering provider: Morgan Banegas MD  08/23/17 2202 Resulting lab: POINT OF CARE TEST, GLUCOSE    Specimen Information    Type Source Collected On     08/23/17 2202          Components       Value Reference Range Flag Lab   Glucose 333 70 - 99 mg/dL H 170            Magnesium [[id number]]  Resulted: 08/23/17 1947, Result status: Final result    Ordering provider: Vance Treadwell PA-C  08/23/17 1833 Resulting lab: [facility]    Specimen Information    Type Source Collected On   Blood  08/23/17 1920          Components       Value Reference Range Flag Lab   Magnesium 1.9 1.6 - 2.3 mg/dL  FrStHsLb            Basic metabolic panel [[id number]] (Abnormal)  Resulted: 08/23/17 1947, Result status: Final result    Ordering provider: Vance Treadwell PA-C  08/23/17 1833 Resulting lab: [facility]    Specimen Information    Type Source Collected On    Blood  08/23/17 1920          Components       Value Reference Range Flag Lab   Sodium 135 133 - 144 mmol/L  FrStHsLb   Potassium 2.9 3.4 - 5.3 mmol/L L FrStHsLb   Chloride 98 94 - 109 mmol/L  FrStHsLb   Carbon Dioxide 28 20 - 32 mmol/L  FrStHsLb   Anion Gap 9 3 - 14 mmol/L  FrStHsLb   Glucose 258 70 - 99 mg/dL H FrStHsLb   Urea Nitrogen 16 7 - 30 mg/dL  FrStHsLb   Creatinine 0.91 0.66 - 1.25 mg/dL  FrStHsLb   GFR Estimate 81 >60 mL/min/1.7m2  FrStHsLb   Comment:  Non  GFR Calc   GFR Estimate If Black >90 >60 mL/min/1.7m2  FrStHsLb   Comment:  African American GFR Calc   Calcium 8.9 8.5 - 10.1 mg/dL  FrStHsLb            Testing Performed By     Lab - Abbreviation Name Director Address Valid Date Range    14 - FrStHsLb [facility] Unknown [address] [place] [address] 05/08/15 1057 - Present    170 - Unknown POINT OF CARE TEST, GLUCOSE Unknown Unknown 10/31/11 1114 - Present            Encounter-Level Documents:     There are no encounter-level documents.      Order-Level Documents:     There are no order-level documents.

## 2017-08-23 NOTE — IP AVS SNAPSHOT
` `           [place] [address] [facility]: [phone]                 INTERAGENCY TRANSFER FORM - NOTES (H&P, Discharge Summary, Consults, Procedures, Therapies)   2017                    Hospital Admission Date: 2017  CHAVEZ MCCAIN   : 1940  Sex: Male        Patient PCP Information     Provider PCP Type    Vasile Brooks MD General      History & Physicals     No notes of this type exist for this encounter.         Discharge Summaries      Discharge Summaries by Shefali Barajas PA-C at 2017  1:08 PM     Author:  Shefali Barajas PA-C Service:  Hospitalist Author Type:  Physician Assistant Alivia CACERES    Filed:  2017  3:07 PM Date of Service:  2017  1:08 PM Creation Time:  2017  1:04 PM    Status:  Cosign Needed :  Shefali Barajas PA-C (Physician Assistant Alivia CACERES)    Cosign Required:  Yes             [facility]    Discharge Summary  Hospitalist    Date of Admission:  2017  Date of Discharge:  2017  Discharging Provider: Shefali Barajas PA-C  Date of Service (when I saw the patient): 17    Discharge Diagnoses   Generalized weakness, recurrent falls[KE1.1]   Hypokalemia, resolved   Hypomagnesemia, resolved   T2DM, non-insulin dependent, uncontrolled[KE1.2]     History of Present Illness   Chavez Mccain is a 76-year-old male with past medical history of benign prostatic hypertrophy with chronic urinary retention with Sequeira placement, obstructive sleep apnea using CPAP, diabetes mellitus type 2, hypertension, dyslipidemia as well as dementia, who presented as a direct admission from [facility] due to recent hx of increased generalized weakness, frequent falls, and wife's inability to care for him at home.  Admitted under observation status. Vitals currently WNL. Pt currently stable. See H&P by Vance Treadwell PA-C from 17 for complete details.     Hospital  Course   Chavez Mccain was admitted on 8/23/2017.  The following problems were addressed during his hospitalization:    Generalized weakness with frequent falls:  The patient recently spent 2 weeks at an assisted living facility where he was nonambulatory and therefore declined in terms of his functional status.  Secondary to this, having more profound generalized weakness and frequent falls subsequent to this. Workup at outlying facility including CMP, CBC, UA, CT head, and CXR was unrevealing aside from hypokalemia and hypomagnesemia which is being repleted.   -- XR left ankle given acute left-sided lateral malleolus pain subsequent to fall[KE1.1]; no acute findings per Radiology[KE1.2]; likely sprain--rest, ice, elevation, compression[KE1.3]   -- PT consulted, recommending TCU at discharge   -- SW to assist with discharge planning; goal to discharge to [facility][KE1.1]; plan for discharge at 1600[KE1.2]     Hyopkalemia, resolved  Hypomagnesemia, resolved: Potassium 2.9>3.2>3.7. Magnesium 1.1>1.9.  -- Pt does take 10 mEq TID PTA and Magnesium supplementation as well; this should be continued at discharge   -- PCP to recheck BMP in the next week      Benign prostatic hypertrophy with chronic urinary retention and indwelling Sequeira catheter:  Urinalysis at [facility] was without pyuria or indication of infection.    -- Continue indwelling Sequeira as per home regimen.    -- Continue prior to admission finasteride   -- Continue PTA Keflex for which he takes for UTI prophylaxis       Obstructive sleep apnea:  Continue CPAP      Diabetes mellitus type 2, non-insulin dependent, uncontrolled:  A1C 8.3. Maintained on Glipizide XL 5 mg po qd with breakfast.   -- Glipizide dosing increased from 5 mg to 10 mg po qd; follow glucose closely, if remains elevated, could consider initiation of metformin for tighter BP control   -- Mod CHO diet[KE1.1]       Recent Labs  Lab 08/24/17  1207 08/24/17  0758 08/24/17  0512  08/24/17  0245 08/23/17  2202 08/23/17  1920   GLC  --   --  257*  --   --  258*   * 299*  --  248* 333*  --[KE1.4]      Hypertension:  Continue prior to admission irbesartan. Resume Metolazone at discharge.      Dementia:  Continue prior to admission donepezil.      Hyperlipidemia:  Resume Lipitor at discharge      Depression: Resume PTA Celexa at discharge     Shefali Barajas PA-C    This patient was discussed with Dr. Iglesias of the Hospitalist Service who agrees with current plans as outlined above.     Significant Results and Procedures   See below     Pending Results   These results will be followed up by PCP  Unresulted Labs Ordered in the Past 30 Days of this Admission     No orders found for last 61 day(s).          Code Status   Full Code       Primary Care Physician   Vasile Brooks        Discharge Disposition   Discharged to short-term care facility  Condition at discharge: Stable    Consultations This Hospital Stay   SOCIAL WORK IP CONSULT  CARE COORDINATOR IP CONSULT  PHYSICAL THERAPY ADULT IP CONSULT  NUTRITION SERVICES ADULT IP CONSULT  PHYSICAL THERAPY ADULT IP CONSULT  OCCUPATIONAL THERAPY ADULT IP CONSULT    Time Spent on this Encounter   I, Shefali Barajas, personally saw the patient today and spent greater than 30 minutes discharging this patient.    Discharge Orders     General info for SNF   Length of Stay Estimate: Short Term Care: Estimated # of Days <30  Condition at Discharge: Stable  Level of care:skilled   Rehabilitation Potential: Fair  Admission H&P remains valid and up-to-date: Yes  Recent Chemotherapy: N/A  Use Nursing Home Standing Orders: Yes     Mantoux instructions   Give two-step Mantoux (PPD) Per Facility Policy Yes     Reason for your hospital stay   You were hospitalized for further evaluation and treatment of generalized weakness and fall.     Glucose monitor nursing POCT   Before meals and at bedtime     Intake and output   Every shift     Daily  weights   Call Provider for weight gain of more than 2 pounds per day or 5 pounds per week.     Sequeira catheter   To straight gravity drainage. Change catheter every 2 weeks and PRN for leaking or decreased uring output with signs of bladder distention. DO NOT change catheter without a specific MD order IF diagnosis of benign prostatic hypertrophy (BPH), neurogenic bladder, or other urological conditions     Follow Up and recommended labs and tests   Follow up with shelter physician.  The following labs/tests are recommended: glucose, BMP.     Activity - Up with nursing assistance     Encourage PO fluids     Additional Discharge Instructions   1. Follow up with PCP in a week to discuss hospitalization, discuss need to further uptitrate diabetes meds, and repeat BMP   2. Glipizide increased to 10 mg by mouth daily, if blood glucose remains elevated, consider initiation of metformin  3. Adequate oral fluid intake encouraged     Nutrition Services Adult IP Consult   Reason:  Poor oral fluid intake. Diabetic.     Physical Therapy Adult Consult   Evaluate and treat as clinically indicated.    Reason:  Physical deconditioning     Occupational Therapy Adult Consult   Evaluate and treat as clinically indicated.    Reason:  Physical deconditioning     Fall precautions     Hip precautions     Advance Diet as Tolerated   Follow this diet upon discharge: Moderate Consistent CHO Diet       Discharge Medications   Current Discharge Medication List      CONTINUE these medications which have CHANGED    Details   glipiZIDE (GLUCOTROL XL) 10 MG 24 hr tablet Take 1 tablet (10 mg) by mouth daily (with breakfast)  Qty: 30 tablet    Associated Diagnoses: Type 2 diabetes mellitus without complication, without long-term current use of insulin (H)         CONTINUE these medications which have NOT CHANGED    Details   miconazole (MICATIN) 2 % cream Apply topically 2 times daily      CITALOPRAM HYDROBROMIDE PO Take 20 mg by mouth daily       FINASTERIDE PO Take 5 mg by mouth daily      Cephalexin (KEFLEX PO) Take 250 mg by mouth daily      MAGNESIUM OXIDE PO Take 400 mg by mouth daily      nystatin (MYCOSTATIN) 269095 UNIT/GM POWD Apply 1 g topically 2 times daily as needed      DONEPEZIL HCL PO Take 10 mg by mouth daily      loperamide (IMODIUM) 2 MG capsule Take 2 mg by mouth 2 times daily      aspirin 81 MG EC tablet Take 81 mg by mouth every evening       Cholecalciferol (VITAMIN D3) 1000 UNIT TABS Take 1,000 Units by mouth every evening       Cyanocobalamin 1000 MCG TABS Take 1,000 mcg by mouth every evening       atorvastatin (LIPITOR) 10 MG tablet Take 10 mg by mouth every morning       irbesartan (AVAPRO) 300 MG tablet Take 300 mg by mouth daily.      MetOLAZONE (ZAROXOLYN) 2.5 MG tablet Take 2.5 mg by mouth daily.      potassium chloride (KLOR-CON) 10 MEQ CR tablet Take 10 mEq by mouth 3 times daily       TRAZodone (DESYREL) 100 MG tablet Take 100 mg by mouth nightly as needed for sleep            Allergies   No Known Allergies  Data   Most Recent 3 CBC's:  Recent Labs   Lab Test  07/10/16   1123  03/03/15   0803  03/02/15   0800   WBC  9.3  9.2  12.9*   HGB  12.9*  14.2  14.9   MCV  93  94  92   PLT  199  211  248      Most Recent 3 BMP's:  Recent Labs   Lab Test  08/24/17   1235  08/24/17   0512  08/23/17   1920  07/10/16   1123   NA   --   137  135  139   POTASSIUM  3.7  3.2*  2.9*  4.3   CHLORIDE   --   101  98  107   CO2   --   28  28  20   BUN   --   15  16  29   CR   --   0.99  0.91  1.14   ANIONGAP   --   8  9  12   CLAUDIA   --   8.7  8.9  9.2   GLC   --   257*  258*  262*     Most Recent 2 LFT's:  Recent Labs   Lab Test  02/27/15   0635  12/06/11 2010   AST  25  18   ALT  52  27   ALKPHOS  71  111   BILITOTAL  0.4  0.7     Most Recent INR's and Anticoagulation Dosing History:  Anticoagulation Dose History     Recent Dosing and Labs Latest Ref Rng & Units 11/29/2005 11/30/2005 12/1/2005 12/2/2005 12/6/2011 3/2/2015    INR 0.86 - 1.14  1.00 1.31(H) 1.52(H) 1.96(H) 1.17(H) 0.97        Most Recent 3 Troponin's:  Recent Labs   Lab Test  12/07/11   0715  12/06/11 2010   TROPI  0.058*  0.071*     Most Recent Cholesterol Panel:No lab results found.  Most Recent 6 Bacteria Isolates From Any Culture (See EPIC Reports for Culture Details):  Recent Labs   Lab Test  06/23/17   2329  07/10/16   1055  09/02/15   1658  03/02/15   1145  02/26/15   1127  01/14/12   0140   CULT  50,000 to 100,000 colonies/mL Citrobacter freundii complex*  No growth  >100,000 colonies/mL Klebsiella pneumoniae*  No growth  >100,000 colonies/mL Klebsiella pneumoniae  >100,000 colonies/mL Staphylococcus aureus  10,000 to 50,000 colonies/mL Strain 2 Klebsiella pneumoniae  *  >100,000 colonies/mL Proteus mirabilis faxed to [phone] so 1.16.2012 1039     Most Recent TSH, T4 and A1c Labs:  Recent Labs   Lab Test  08/24/17   0512   A1C  8.3*     Results for orders placed or performed during the hospital encounter of 02/26/15   CT Head w/o Contrast    Narrative    CT OF THE HEAD WITHOUT CONTRAST February 26, 2015 11:55 AM     HISTORY: Weakness right leg greater than left leg, upper extremity  weakness.    TECHNIQUE: 5 mm thick axial CT images of the head were acquired  without IV contrast material.    COMPARISON: None.    FINDINGS: There is moderate diffuse cerebral volume loss. There are  subtle patchy areas of decreased density in the cerebral white matter  bilaterally that are consistent with sequela of chronic small vessel  ischemic disease.     The ventricles and basal cisterns are within normal limits in  configuration given the degree of cerebral volume loss.  There is no  midline shift. There are no extra-axial fluid collections.     No intracranial hemorrhage, mass or recent infarct.    The visualized paranasal sinuses are well-aerated. Left mastoidectomy  changes are noted. The mastoid air cells on the right are well  aerated. There are no fractures of the visualized bones.        Impression    IMPRESSION: Diffuse cerebral volume loss and cerebral white matter  changes consistent with chronic small vessel ischemic disease. No  evidence for acute intracranial pathology.      MOLLY COLVIN MD[KE1.1]          Revision History        User Key Date/Time User Provider Type Action    > [N/A] 8/24/2017  3:07 PM Shefali Barajas PA-C Physician Assistant - NICKI Sign     KE1.3 8/24/2017  3:06 PM Shefali Barajas PA-C Physician Assistant - NICKI Sign     KE1.2 8/24/2017  3:05 PM Shefali Barajas PA-C Physician Assistant - C      KE1.4 8/24/2017  1:07 PM Shefali Barajas PA-C Physician Assistant - C      KE1.1 8/24/2017  1:04 PM Shefali Barajas PA-C Physician Assistant - C                      Consult Notes      Consults by Tung Emerson RN at 8/24/2017 11:52 AM     Author:  Tung Emerson RN Service:  Care Coordinator Author Type:      Filed:  8/24/2017 11:52 AM Date of Service:  8/24/2017 11:52 AM Creation Time:  8/24/2017 11:26 AM    Status:  Signed :  Tung Emerson RN ()     Consult Orders:    1. Care Coordinator IP Consult [[id number]] ordered by Vance Treadwell PA-C at 08/23/17 1833                Met with patient and spouse to discuss observation status and plan of care. Patient lives with spouse . Spouse stating  Patient has been falling at home and it is getting increasingly difficult for spouse to care for him. Patient receives home care through  [facility]. Spouse requested  [facility] till patient improves his strength. Patient and spouse is aware of  private pay  for TCU. They feel this is the best plan for patient. SW working on TCU  for poss dc today.[BV1.1]     Revision History        User Key Date/Time User Provider Type Action    > BV1.1 8/24/2017 11:52 AM Tung Emerson RN Case Manager Sign                     Progress Notes - Physician (Notes from 08/21/17 through 08/24/17)       Progress Notes by Airam Flanagan at 8/24/2017  1:45 PM     Author:  Airam Flanagan Service:  (none) Author Type:      Filed:  8/24/2017  2:06 PM Date of Service:  8/24/2017  1:45 PM Creation Time:  8/24/2017  1:45 PM    Status:  Addendum :  Airam Flanagan ()         SWS  D:  SWS following pt for TCU placement.  Earlier today, unit SW had faxed referral to [facility] for TCU admission today.  Per Kristan in admissions they can accept pt for TCU admission today.  Cost of private room is $45 per day and $3257.38 required by wife at time of admission.  Wife and pt agreed with this plan.  Wife will transport pt to [facility]CU at 1600 today  I:  SW informed Kristan in admissions of 1600 transport time.  P:  Pt to discharge to [facility]CU at 1600 today.[LL1.1]    PAS-RR    D: Per DHS regulation, SW completed and submitted PAS-RR to [place] [employer] Direct Connect via the Senior LinkAge Line.  PAS-RR confirmation # is : [id number]    I: SW spoke with pt's wife Maria Dolores and they are aware a PAS-RR has been submitted.  SW reviewed with Maria Dolores that they may be contacted for a follow up appointment within 10 days of hospital discharge if their SNF stay is < 30 days.  Contact information for [facility] was also provided.    A: Maria Dolores verbalized understanding.    P: Further questions may be directed to [facility] LinkAge Line at #[phone], option #4 for PAS-RR staff.[LL1.2]       Revision History        User Key Date/Time User Provider Type Action    > LL1.2 8/24/2017  2:06 PM Airam Flanagan  Addend     LL1.1 8/24/2017  1:49 PM Airam Flanagan  Sign                  Procedure Notes     No notes of this type exist for this encounter.      Progress Notes - Therapies (Notes from 08/21/17 through 08/24/17)     No notes of this type exist for this encounter.

## 2017-08-24 ENCOUNTER — APPOINTMENT (OUTPATIENT)
Dept: GENERAL RADIOLOGY | Facility: CLINIC | Age: 77
End: 2017-08-24
Attending: PHYSICIAN ASSISTANT
Payer: MEDICARE

## 2017-08-24 VITALS
BODY MASS INDEX: 43.6 KG/M2 | WEIGHT: 255.4 LBS | RESPIRATION RATE: 18 BRPM | DIASTOLIC BLOOD PRESSURE: 67 MMHG | OXYGEN SATURATION: 96 % | SYSTOLIC BLOOD PRESSURE: 136 MMHG | HEART RATE: 87 BPM | TEMPERATURE: 97.7 F | HEIGHT: 64 IN

## 2017-08-24 LAB
ANION GAP SERPL CALCULATED.3IONS-SCNC: 8 MMOL/L (ref 3–14)
BUN SERPL-MCNC: 15 MG/DL (ref 7–30)
CALCIUM SERPL-MCNC: 8.7 MG/DL (ref 8.5–10.1)
CHLORIDE SERPL-SCNC: 101 MMOL/L (ref 94–109)
CO2 SERPL-SCNC: 28 MMOL/L (ref 20–32)
CREAT SERPL-MCNC: 0.99 MG/DL (ref 0.66–1.25)
GFR SERPL CREATININE-BSD FRML MDRD: 73 ML/MIN/1.7M2
GLUCOSE BLDC GLUCOMTR-MCNC: 248 MG/DL (ref 70–99)
GLUCOSE BLDC GLUCOMTR-MCNC: 299 MG/DL (ref 70–99)
GLUCOSE BLDC GLUCOMTR-MCNC: 335 MG/DL (ref 70–99)
GLUCOSE SERPL-MCNC: 257 MG/DL (ref 70–99)
HBA1C MFR BLD: 8.3 % (ref 4.3–6)
POTASSIUM SERPL-SCNC: 3.2 MMOL/L (ref 3.4–5.3)
POTASSIUM SERPL-SCNC: 3.7 MMOL/L (ref 3.4–5.3)
SODIUM SERPL-SCNC: 137 MMOL/L (ref 133–144)

## 2017-08-24 PROCEDURE — 40000893 ZZH STATISTIC PT IP EVAL DEFER

## 2017-08-24 PROCEDURE — A9270 NON-COVERED ITEM OR SERVICE: HCPCS | Mod: GY | Performed by: PHYSICIAN ASSISTANT

## 2017-08-24 PROCEDURE — 83036 HEMOGLOBIN GLYCOSYLATED A1C: CPT | Performed by: PHYSICIAN ASSISTANT

## 2017-08-24 PROCEDURE — 73610 X-RAY EXAM OF ANKLE: CPT | Mod: LT

## 2017-08-24 PROCEDURE — 96372 THER/PROPH/DIAG INJ SC/IM: CPT

## 2017-08-24 PROCEDURE — G0378 HOSPITAL OBSERVATION PER HR: HCPCS

## 2017-08-24 PROCEDURE — 84132 ASSAY OF SERUM POTASSIUM: CPT | Mod: 91 | Performed by: PHYSICIAN ASSISTANT

## 2017-08-24 PROCEDURE — 25000132 ZZH RX MED GY IP 250 OP 250 PS 637: Mod: GY | Performed by: PHYSICIAN ASSISTANT

## 2017-08-24 PROCEDURE — 99217 ZZC OBSERVATION CARE DISCHARGE: CPT | Performed by: PHYSICIAN ASSISTANT

## 2017-08-24 PROCEDURE — 80048 BASIC METABOLIC PNL TOTAL CA: CPT | Performed by: PHYSICIAN ASSISTANT

## 2017-08-24 PROCEDURE — 36415 COLL VENOUS BLD VENIPUNCTURE: CPT | Performed by: PHYSICIAN ASSISTANT

## 2017-08-24 PROCEDURE — 00000146 ZZHCL STATISTIC GLUCOSE BY METER IP

## 2017-08-24 PROCEDURE — 25000131 ZZH RX MED GY IP 250 OP 636 PS 637: Mod: GY | Performed by: PHYSICIAN ASSISTANT

## 2017-08-24 RX ORDER — GLIPIZIDE 5 MG/1
10 TABLET, FILM COATED, EXTENDED RELEASE ORAL
Status: DISCONTINUED | OUTPATIENT
Start: 2017-08-25 | End: 2017-08-24 | Stop reason: HOSPADM

## 2017-08-24 RX ORDER — GLIPIZIDE 10 MG/1
10 TABLET, FILM COATED, EXTENDED RELEASE ORAL
Qty: 30 TABLET | DISCHARGE
Start: 2017-08-25 | End: 2021-07-11

## 2017-08-24 RX ADMIN — DONEPEZIL HYDROCHLORIDE 10 MG: 10 TABLET, FILM COATED ORAL at 09:16

## 2017-08-24 RX ADMIN — GLIPIZIDE 5 MG: 5 TABLET, FILM COATED, EXTENDED RELEASE ORAL at 09:16

## 2017-08-24 RX ADMIN — TRAZODONE HYDROCHLORIDE 100 MG: 50 TABLET ORAL at 02:55

## 2017-08-24 RX ADMIN — IRBESARTAN 300 MG: 150 TABLET ORAL at 09:16

## 2017-08-24 RX ADMIN — ACETAMINOPHEN 650 MG: 325 TABLET, FILM COATED ORAL at 15:44

## 2017-08-24 RX ADMIN — POTASSIUM CHLORIDE 40 MEQ: 1500 TABLET, EXTENDED RELEASE ORAL at 06:42

## 2017-08-24 RX ADMIN — POTASSIUM CHLORIDE 20 MEQ: 1500 TABLET, EXTENDED RELEASE ORAL at 08:15

## 2017-08-24 RX ADMIN — CITALOPRAM HYDROBROMIDE 20 MG: 20 TABLET ORAL at 09:16

## 2017-08-24 RX ADMIN — POTASSIUM CHLORIDE 10 MEQ: 750 TABLET, EXTENDED RELEASE ORAL at 09:16

## 2017-08-24 RX ADMIN — INSULIN ASPART 4 UNITS: 100 INJECTION, SOLUTION INTRAVENOUS; SUBCUTANEOUS at 09:22

## 2017-08-24 RX ADMIN — FINASTERIDE 5 MG: 5 TABLET, FILM COATED ORAL at 09:16

## 2017-08-24 RX ADMIN — CEPHALEXIN 250 MG: 250 CAPSULE ORAL at 09:16

## 2017-08-24 RX ADMIN — POTASSIUM CHLORIDE 40 MEQ: 1500 TABLET, EXTENDED RELEASE ORAL at 00:54

## 2017-08-24 NOTE — CONSULTS
Met with patient and spouse to discuss observation status and plan of care. Patient lives with spouse . Spouse stating  Patient has been falling at home and it is getting increasingly difficult for spouse to care for him. Patient receives home care through  [facility]. Spouse requested  [facility] till patient improves his strength. Patient and spouse is aware of  private pay  for TCU. They feel this is the best plan for patient. CLARIBEL working on TCU  for poss dc today.

## 2017-08-24 NOTE — PLAN OF CARE
Problem: Goal Outcome Summary  Goal: Goal Outcome Summary  Outcome: Adequate for Discharge Date Met:  08/24/17  A&O X 2-3, forgetful.  Wife at bedside most of day.  Up with 1-2 assist and ambulates with walker to bathroom.  Sat in chair for meals.  Excellent appetite.  Blood sugars high and diabetic meds and insulin adjusted by hospitalist.  Complained of pain in back and right leg pain and later in day denied those pains and has c/o of pain in left ankle.  Xray of ankle done today.  Plan is to discharge to [place] this afternoon.

## 2017-08-24 NOTE — DISCHARGE SUMMARY
[facility]    Discharge Summary  Hospitalist    Date of Admission:  8/23/2017  Date of Discharge:  8/24/2017  Discharging Provider: Shefali Barajas PA-C  Date of Service (when I saw the patient): 08/24/17    Discharge Diagnoses   Generalized weakness, recurrent falls   Hypokalemia, resolved   Hypomagnesemia, resolved   T2DM, non-insulin dependent, uncontrolled     History of Present Illness   Chavez Mccain is a 76-year-old male with past medical history of benign prostatic hypertrophy with chronic urinary retention with Sequeira placement, obstructive sleep apnea using CPAP, diabetes mellitus type 2, hypertension, dyslipidemia as well as dementia, who presented as a direct admission from [facility] due to recent hx of increased generalized weakness, frequent falls, and wife's inability to care for him at home.  Admitted under observation status. Vitals currently WNL. Pt currently stable. See H&P by Vance Treadwell PA-C from 8/23/17 for complete details.     Hospital Course   Chavez Mccain was admitted on 8/23/2017.  The following problems were addressed during his hospitalization:    Generalized weakness with frequent falls:  The patient recently spent 2 weeks at an assisted living facility where he was nonambulatory and therefore declined in terms of his functional status.  Secondary to this, having more profound generalized weakness and frequent falls subsequent to this. Workup at outlying facility including CMP, CBC, UA, CT head, and CXR was unrevealing aside from hypokalemia and hypomagnesemia which is being repleted.   -- XR left ankle given acute left-sided lateral malleolus pain subsequent to fall; no acute findings per Radiology; likely sprain--rest, ice, elevation, compression   -- PT consulted, recommending TCU at discharge   -- SW to assist with discharge planning; goal to discharge to [facility]; plan for discharge at 1600     [facility],  resolved  Hypomagnesemia, resolved: Potassium 2.9>3.2>3.7. Magnesium 1.1>1.9.  -- Pt does take 10 mEq TID PTA and Magnesium supplementation as well; this should be continued at discharge   -- PCP to recheck BMP in the next week      Benign prostatic hypertrophy with chronic urinary retention and indwelling Sequeira catheter:  Urinalysis at [facility] was without pyuria or indication of infection.    -- Continue indwelling Sequeira as per home regimen.    -- Continue prior to admission finasteride   -- Continue PTA Keflex for which he takes for UTI prophylaxis       Obstructive sleep apnea:  Continue CPAP      Diabetes mellitus type 2, non-insulin dependent, uncontrolled:  A1C 8.3. Maintained on Glipizide XL 5 mg po qd with breakfast.   -- Glipizide dosing increased from 5 mg to 10 mg po qd; follow glucose closely, if remains elevated, could consider initiation of metformin for tighter BP control   -- Mod CHO diet       Recent Labs  Lab 08/24/17  1207 08/24/17  0758 08/24/17  0512 08/24/17  0245 08/23/17  2202 08/23/17  1920   GLC  --   --  257*  --   --  258*   * 299*  --  248* 333*  --      Hypertension:  Continue prior to admission irbesartan. Resume Metolazone at discharge.      Dementia:  Continue prior to admission donepezil.      Hyperlipidemia:  Resume Lipitor at discharge      Depression: Resume PTA Celexa at discharge     Shefali Barajas PA-C    This patient was discussed with Dr. Iglesias of the Hospitalist Service who agrees with current plans as outlined above.     Significant Results and Procedures   See below     Pending Results   These results will be followed up by PCP  Unresulted Labs Ordered in the Past 30 Days of this Admission     No orders found for last 61 day(s).          Code Status   Full Code       Primary Care Physician   Vasile Brooks        Discharge Disposition   Discharged to short-term care facility  Condition at discharge: Stable    Consultations This Hospital Stay    SOCIAL WORK IP CONSULT  CARE COORDINATOR IP CONSULT  PHYSICAL THERAPY ADULT IP CONSULT  NUTRITION SERVICES ADULT IP CONSULT  PHYSICAL THERAPY ADULT IP CONSULT  OCCUPATIONAL THERAPY ADULT IP CONSULT    Time Spent on this Encounter   I, Shefali Barajas, personally saw the patient today and spent greater than 30 minutes discharging this patient.    Discharge Orders     General info for SNF   Length of Stay Estimate: Short Term Care: Estimated # of Days <30  Condition at Discharge: Stable  Level of care:skilled   Rehabilitation Potential: Fair  Admission H&P remains valid and up-to-date: Yes  Recent Chemotherapy: N/A  Use Nursing Home Standing Orders: Yes     Mantoux instructions   Give two-step Mantoux (PPD) Per Facility Policy Yes     Reason for your hospital stay   You were hospitalized for further evaluation and treatment of generalized weakness and fall.     Glucose monitor nursing POCT   Before meals and at bedtime     Intake and output   Every shift     Daily weights   Call Provider for weight gain of more than 2 pounds per day or 5 pounds per week.     Sequeira catheter   To straight gravity drainage. Change catheter every 2 weeks and PRN for leaking or decreased uring output with signs of bladder distention. DO NOT change catheter without a specific MD order IF diagnosis of benign prostatic hypertrophy (BPH), neurogenic bladder, or other urological conditions     Follow Up and recommended labs and tests   Follow up with retirement physician.  The following labs/tests are recommended: glucose, BMP.     Activity - Up with nursing assistance     Encourage PO fluids     Additional Discharge Instructions   1. Follow up with PCP in a week to discuss hospitalization, discuss need to further uptitrate diabetes meds, and repeat BMP   2. Glipizide increased to 10 mg by mouth daily, if blood glucose remains elevated, consider initiation of metformin  3. Adequate oral fluid intake encouraged     Nutrition  Services Adult IP Consult   Reason:  Poor oral fluid intake. Diabetic.     Physical Therapy Adult Consult   Evaluate and treat as clinically indicated.    Reason:  Physical deconditioning     Occupational Therapy Adult Consult   Evaluate and treat as clinically indicated.    Reason:  Physical deconditioning     Fall precautions     Hip precautions     Advance Diet as Tolerated   Follow this diet upon discharge: Moderate Consistent CHO Diet       Discharge Medications   Current Discharge Medication List      CONTINUE these medications which have CHANGED    Details   glipiZIDE (GLUCOTROL XL) 10 MG 24 hr tablet Take 1 tablet (10 mg) by mouth daily (with breakfast)  Qty: 30 tablet    Associated Diagnoses: Type 2 diabetes mellitus without complication, without long-term current use of insulin (H)         CONTINUE these medications which have NOT CHANGED    Details   miconazole (MICATIN) 2 % cream Apply topically 2 times daily      CITALOPRAM HYDROBROMIDE PO Take 20 mg by mouth daily      FINASTERIDE PO Take 5 mg by mouth daily      Cephalexin (KEFLEX PO) Take 250 mg by mouth daily      MAGNESIUM OXIDE PO Take 400 mg by mouth daily      nystatin (MYCOSTATIN) 556256 UNIT/GM POWD Apply 1 g topically 2 times daily as needed      DONEPEZIL HCL PO Take 10 mg by mouth daily      loperamide (IMODIUM) 2 MG capsule Take 2 mg by mouth 2 times daily      aspirin 81 MG EC tablet Take 81 mg by mouth every evening       Cholecalciferol (VITAMIN D3) 1000 UNIT TABS Take 1,000 Units by mouth every evening       Cyanocobalamin 1000 MCG TABS Take 1,000 mcg by mouth every evening       atorvastatin (LIPITOR) 10 MG tablet Take 10 mg by mouth every morning       irbesartan (AVAPRO) 300 MG tablet Take 300 mg by mouth daily.      MetOLAZONE (ZAROXOLYN) 2.5 MG tablet Take 2.5 mg by mouth daily.      potassium chloride (KLOR-CON) 10 MEQ CR tablet Take 10 mEq by mouth 3 times daily       TRAZodone (DESYREL) 100 MG tablet Take 100 mg by mouth  nightly as needed for sleep            Allergies   No Known Allergies  Data   Most Recent 3 CBC's:  Recent Labs   Lab Test  07/10/16   1123  03/03/15   0803  03/02/15   0800   WBC  9.3  9.2  12.9*   HGB  12.9*  14.2  14.9   MCV  93  94  92   PLT  199  211  248      Most Recent 3 BMP's:  Recent Labs   Lab Test  08/24/17   1235  08/24/17   0512  08/23/17   1920  07/10/16   1123   NA   --   137  135  139   POTASSIUM  3.7  3.2*  2.9*  4.3   CHLORIDE   --   101  98  107   CO2   --   28  28  20   BUN   --   15  16  29   CR   --   0.99  0.91  1.14   ANIONGAP   --   8  9  12   CLAUDIA   --   8.7  8.9  9.2   GLC   --   257*  258*  262*     Most Recent 2 LFT's:  Recent Labs   Lab Test  02/27/15   0635  12/06/11 2010   AST  25  18   ALT  52  27   ALKPHOS  71  111   BILITOTAL  0.4  0.7     Most Recent INR's and Anticoagulation Dosing History:  Anticoagulation Dose History     Recent Dosing and Labs Latest Ref Rng & Units 11/29/2005 11/30/2005 12/1/2005 12/2/2005 12/6/2011 3/2/2015    INR 0.86 - 1.14 1.00 1.31(H) 1.52(H) 1.96(H) 1.17(H) 0.97        Most Recent 3 Troponin's:  Recent Labs   Lab Test  12/07/11   0715  12/06/11 2010   TROPI  0.058*  0.071*     Most Recent Cholesterol Panel:No lab results found.  Most Recent 6 Bacteria Isolates From Any Culture (See EPIC Reports for Culture Details):  Recent Labs   Lab Test  06/23/17   2329  07/10/16   1055  09/02/15   1658  03/02/15   1145  02/26/15   1127  01/14/12   0140   CULT  50,000 to 100,000 colonies/mL Citrobacter freundii complex*  No growth  >100,000 colonies/mL Klebsiella pneumoniae*  No growth  >100,000 colonies/mL Klebsiella pneumoniae  >100,000 colonies/mL Staphylococcus aureus  10,000 to 50,000 colonies/mL Strain 2 Klebsiella pneumoniae  *  >100,000 colonies/mL Proteus mirabilis faxed to [phone] so 1.16.2012 1039     Most Recent TSH, T4 and A1c Labs:  Recent Labs   Lab Test  08/24/17   0512   A1C  8.3*     Results for orders placed or performed during the  hospital encounter of 02/26/15   CT Head w/o Contrast    Narrative    CT OF THE HEAD WITHOUT CONTRAST February 26, 2015 11:55 AM     HISTORY: Weakness right leg greater than left leg, upper extremity  weakness.    TECHNIQUE: 5 mm thick axial CT images of the head were acquired  without IV contrast material.    COMPARISON: None.    FINDINGS: There is moderate diffuse cerebral volume loss. There are  subtle patchy areas of decreased density in the cerebral white matter  bilaterally that are consistent with sequela of chronic small vessel  ischemic disease.     The ventricles and basal cisterns are within normal limits in  configuration given the degree of cerebral volume loss.  There is no  midline shift. There are no extra-axial fluid collections.     No intracranial hemorrhage, mass or recent infarct.    The visualized paranasal sinuses are well-aerated. Left mastoidectomy  changes are noted. The mastoid air cells on the right are well  aerated. There are no fractures of the visualized bones.       Impression    IMPRESSION: Diffuse cerebral volume loss and cerebral white matter  changes consistent with chronic small vessel ischemic disease. No  evidence for acute intracranial pathology.      MOLLY COLVIN MD

## 2017-08-24 NOTE — PLAN OF CARE
Problem: Goal Outcome Summary  Goal: Goal Outcome Summary  PT: Orders received, chart reviewed, discussed with care team. Pt admitted with frequent falls. Prior to admit pt was living with spouse, uses FWW and ambulates independently. Currently requires heavy assistance with all mobility with nursing. Pt demonstrates dec strength, balance, activity tolerance and difficulty ambulating and transferring and would benefit from skilled PT services in order to improve this. Recommend discharge to TCU. Per care coordinator note, pt and family aware of private pay and are in agreement with this plan. Defer PT to TCU setting. Pt has no skilled inpatient PT needs at this time.     .

## 2017-08-24 NOTE — PLAN OF CARE
Problem: Goal Outcome Summary  Goal: Goal Outcome Summary  Outcome: Adequate for Discharge Date Met:  08/24/17  Patient discharged to [facility] at 3:55 PM via wife. IV removed, tip intact. No new medications. Discharge AVS reviewed with patient and wife, they verbalized understanding and had no further questions. VSS on RA. Given tylenol before discharge for left ankle pain. XR left ankle neg. All patient belongings accounted for and sent home with patient.

## 2017-08-24 NOTE — PLAN OF CARE
Problem: Goal Outcome Summary  Goal: Goal Outcome Summary  Outcome: No Change  [id number] Direct admit arrived shortly prior to my shift. Patient alert and disoriented to time/date. Patient has baseline dementia according to wife. VSS on RA. Infrequent non-productive cough. Patient is [place], has hearing aids at home but refuses to wear them. Rt ear he can hear the best from. Family requests patient to wear his tennis shoes at all times when out of bed. Patient has groin rash which his wife states they have been treating for a while. Ani-fungel powder applied. Potassium 2.9, given one dose per protocol. Patient had eaten dinner before blood sugar check so didn't give novolog.  at dinner. BG bedtime 333, coverage given. LT arm PIV: SL. Tolerating mod carb diet. Up with strong assist 2, gait belt and walker. Walker is baseline at home per wife.  Possible d/c to [facility] tomorrow, wife hoping  can go home instead. Will continue to monitor

## 2017-08-24 NOTE — PROGRESS NOTES
[facility]    Hospitalist Progress Note    Date of Service (when I saw the patient): 08/24/2017    Assessment & Plan   Chavez Mccain is a 76-year-old male with past medical history of benign prostatic hypertrophy with chronic urinary retention with Sequeira placement, obstructive sleep apnea using CPAP, diabetes mellitus type 2, hypertension, dyslipidemia as well as dementia, who presented as a direct admission from [facility] due to recent hx of increased generalized weakness, frequent falls, and wife's inability to care for him at home.  Admitted under observation status. Vitals currently WNL. Pt currently stable.     Generalized weakness with frequent falls:  The patient recently spent 2 weeks at an assisted living facility where he was nonambulatory and therefore declined in terms of his functional status.  Secondary to this, having more profound generalized weakness and frequent falls subsequent to this. Workup at outlying facility including CMP, CBC, UA, CT head, and CXR was unrevealing aside from hypokalemia and hypomagnesemia which is being repleted.   -- XR left ankle given acute left-sided lateral malleolus pain subsequent to fall  -- Treat hypokalemia as below   -- PT consulted to assist with disposition recommendations given recent decline in functional status.   -- SW to assist with discharge planning; goal to discharge to [facility]    Hyopkalemia  Hypomagnesemia, resolved: Potassium 2.9>3.2. Magnesium 1.1>1.9.  -- Replete per protocol, potassium recheck at 1230   -- Pt does take 10 mEq TID PTA and Magnesium supplementation as well     Benign prostatic hypertrophy with chronic urinary retention and indwelling Sequeira catheter:  Urinalysis at [facility] was without pyuria or indication of infection.    -- Continue indwelling Sequeira as per home regimen.    -- Continue prior to admission finasteride   -- Continue PTA Keflex for which he takes for UTI prophylaxis       Obstructive sleep apnea:  Continue CPAP with home settings.     Diabetes mellitus type 2, non-insulin dependent, uncontrolled:  A1C 8.3. Maintained on Glipizide XL 5 mg po qd with breakfast.   -- Continue PTA Glipizide; could titrate dosage up to 10 pending blood sugar trend  -- Medium dose SSI   -- BS per protocol   -- Mod CHO diet     Recent Labs  Lab 08/24/17  0758 08/24/17  0512 08/24/17  0245 08/23/17  2202 08/23/17  1920   GLC  --  257*  --   --  258*   *  --  248* 333*  --       Hypertension:  Continue prior to admission irbesartan. Resume Metolazone at discharge.     Dementia:  Continue prior to admission donepezil.     Hyperlipidemia:  Holding prior to admission Lipitor given observation status.     Depression: Resume PTA Celexa at discharge     DVT Prophylaxis: Ambulate every shift  Code Status: DNR/DNI    Disposition: Expected discharge likely later today.      Shefali Barajas PA-C    This patient was discussed with Dr. Iglesias of the Hospitalist Service who agrees with current plans as outlined above.     Interval History   Slept ok overnight. Potassium replacement protocol in place. No acute events reported overnight. Afebrile. Complains of some left ankle pain subsequent to recent falls. First fall occurred 8/21 at which time his right leg collapsed and patient fell to the floor. Yesterday AM, the patient fell out of bed onto his right side which warranted the assistance of [place] [employer].     -Data reviewed today: Awaiting XR left ankle.     Physical Exam   Temp: 98  F (36.7  C) Temp src: Oral BP: 151/77 Pulse: 81   Resp: 18 SpO2: 96 % O2 Device: None (Room air)    Vitals:    08/23/17 1846   Weight: 115.8 kg (255 lb 6.4 oz)     Vital Signs with Ranges  Temp:  [97.7  F (36.5  C)-98  F (36.7  C)] 98  F (36.7  C)  Pulse:  [78-83] 81  Resp:  [18] 18  BP: (140-154)/(70-77) 151/77  SpO2:  [96 %] 96 %  I/O last 3 completed shifts:  In: 240 [P.O.:240]  Out: 800  [Urine:800]    CONSTITUTIONAL: Pt sitting upright in chair, dressed in hospital garb. Appears comfortable. Cooperative with interview. Accompanied by wife at bedside.   HEENT: Normocephalic, atraumatic. Negative for conjunctival redness or scleral icterus.    CARDIOVASCULAR: RRR, no murmurs, rubs, or extra heart sounds appreciated. Pulses +2/4 and regular in upper and lower extremities, bilaterally.   RESPIRATORY: No increased work of breathing.  CTA, bilat; no wheezes, rales, or rhonchi appreciated.  GASTROINTESTINAL:  Abdomen soft, non-distended. BS auscultated in all four quadrants. Negative for tenderness to palpation.  No masses or organomegaly noted.  MUSCULOSKELETAL: Some tenderness upon palpation of left lateral malleolus. No gross deformities noted. Normal muscle tone.   HEMATOLOGIC/LYMPHATIC/IMMUNOLOGIC:  Negative for lower extremity edema, bilaterally.  NEUROLOGIC: Forgetful, disoriented to time.  strength intact. No focal neuro deficits appreciated.   SKIN: Warm, dry, intact. No jaundice noted. Negative for suspicious lesions, rashes, bruising, open sores or abrasions.     Medications        sodium chloride (PF)  3 mL Intracatheter Q8H     insulin aspart  1-7 Units Subcutaneous TID AC     insulin aspart  1-5 Units Subcutaneous At Bedtime     cephalexin (KEFLEX) capsule 250 mg  250 mg Oral Daily     citalopram (celeXA) tablet 20 mg  20 mg Oral Daily     donepezil (ARICEPT) tablet 10 mg  10 mg Oral Daily     glipiZIDE (GLUCOTROL XL) 24 hr tablet 5 mg  5 mg Oral Daily with breakfast     finasteride (PROSCAR) tablet 5 mg  5 mg Oral Daily     irbesartan  300 mg Oral Daily       Data     Recent Labs  Lab 08/24/17  0512 08/23/17  1920    135   POTASSIUM 3.2* 2.9*   CHLORIDE 101 98   CO2 28 28   BUN 15 16   CR 0.99 0.91   ANIONGAP 8 9   CLAUDIA 8.7 8.9   * 258*       No results found for this or any previous visit (from the past 24 hour(s)).

## 2017-08-24 NOTE — PROVIDER NOTIFICATION
MD Notification    Notified Person:  MD    Notified Persons Name: Eben    Notification Date/Time: 08/23/17 2025    Notification Interaction:  Talked with Physician    Purpose of Notification: Potassium 2.9, no currently potassium protocol entered    Orders Received:     Comments: potassium ordered, not protocol

## 2017-08-24 NOTE — PROGRESS NOTES
SWS  D:  SWS following pt for TCU placement.  Earlier today, unit SW had faxed referral to [facility] for TCU admission today.  Per Kristan in admissions they can accept pt for TCU admission today.  Cost of private room is $45 per day and $3257.38 required by wife at time of admission.  Wife and pt agreed with this plan.  Wife will transport pt to [facility] TCU at 1600 today  I:  SW informed Kristan in admissions of 1600 transport time.  P:  Pt to discharge to [facility] TCU at 1600 today.    PAS-RR    D: Per DHS regulation, SW completed and submitted PAS-RR to [place] [employer] Direct Connect via the Senior LinkAge Line.  PAS-RR confirmation # is : [id number]    I: SW spoke with pt's wife Maria Dolores and they are aware a PAS-RR has been submitted.  SW reviewed with Maria Dolores that they may be contacted for a follow up appointment within 10 days of hospital discharge if their SNF stay is < 30 days.  Contact information for Senior LinkAge Line was also provided.    A: Maria Dolores verbalized understanding.    P: Further questions may be directed to [facility] LinkAge Line at #[phone], option #4 for PAS-RR staff.

## 2017-08-24 NOTE — H&P
CHIEF COMPLAINT:  Generalized weakness.      HISTORY OF PRESENT ILLNESS:  Chavez Mccain is a 76-year-old male with past medical history of BPH with chronic urinary retention with chronic indwelling Sequeira placement, obstructive sleep apnea, uses CPAP, diabetes mellitus type 2, hypertension, hyperlipidemia and dementia who presented as a direct admission to [facility] as a referral from [facility] [place] due to generalized weakness and inability to care for self at home.  The patient typically lives with his wife; however, due to his frontal lobe dementia, he has had a steady decline significantly over the past few weeks.  The patient's wife recently went on a vacation earlier, approximately 3 weeks ago, during which time he was placed at [facility] and during that 2 weeks wife reports that he was nonambulatory and did not go to the dining room for any of his meals, and therefore remained in the same room essentially for 2 weeks.  She reports that upon picking him up from the facility that he appeared extremely weak and he was minimally able to transport himself as well as ambulate around their home, which he was previously able to perform without difficulty.  He does typically use a walker with ambulation; however, he even had difficulty getting out of bed.  As a result, he has fallen approximately 3 times within the last week.  The last one most recently this morning and wife notes that overnight last night he was extremely restless and therefore she was concerned about possible underlying metabolic reason for his weakness, so she elected to present initially to [facility] for evaluation.      On arrival at [facility], the patient was comprehensibly evaluated with basic laboratory studies including a BMP, CBC, urinalysis, CT of the head as well as chest x-ray, which were remarkable for a potassium of 2.8 and a magnesium of 1.1, but was  otherwise without any acute findings.  At that time it was discussed with the wife the option of discharging home versus is attempting to find placement from the Emergency Department, and wife did not feel comfortable taking him home due to his increasing falls and severe weakness.  The patient has spent some time at [facility] in the past and therefore attempts were made to place him in the rehab portion of [facility] this evening, however, due to the late hour the patient was unable to be accepted and therefore request was made to admit patient to [facility] under observation overnight for placement in the morning.      The patient is presently evaluated in the hospital room with wife and granddaughter at bedside.  He currently reports that he feels well.  He denies any difficulty breathing, shortness of breath, chest pain, chest discomfort, no abdominal pain or discomfort.  He does report that his legs feel weak and that they feel quite tired and that he does note that his knees will buckle on occasion.      PAST MEDICAL HISTORY:   1.  BPH with chronic urinary retention and indwelling Sequeira placement.   2.  Obstructive sleep apnea, uses CPAP.   3.  Diabetes mellitus type 2.   4.  Hypertension.   5.  Dyslipidemia.   6.  Dementia, frontal lobe.   7.  History of hypokalemia.   8.  CKD with a baseline creatinine of 0.8 to 0.9.      PAST SURGICAL HISTORY:   1.  Knee arthroscopy.   2.  Back surgery.   3.  Hemorrhoidectomy.   4.  ENT.      PRIOR TO ADMISSION MEDICATIONS:    Prior to Admission medications    Medication Sig Last Dose Taking? Auth Provider   glipiZIDE (GLUCOTROL XL) 10 MG 24 hr tablet Take 1 tablet (10 mg) by mouth daily (with breakfast)  Yes Shefali Barajas PA-C   miconazole (MICATIN) 2 % cream Apply topically 2 times daily 8/23/2017 at Unknown time Yes Unknown, Entered By History   CITALOPRAM HYDROBROMIDE PO Take 20 mg by mouth daily 8/23/2017 at am Yes Unknown, Entered By  History   FINASTERIDE PO Take 5 mg by mouth daily 8/23/2017 at am Yes Unknown, Entered By History   Cephalexin (KEFLEX PO) Take 250 mg by mouth daily 8/23/2017 at am Yes Unknown, Entered By History   MAGNESIUM OXIDE PO Take 400 mg by mouth daily 8/23/2017 at am Yes Unknown, Entered By History   nystatin (MYCOSTATIN) 832248 UNIT/GM POWD Apply 1 g topically 2 times daily as needed Past Month at Unknown time Yes Unknown, Entered By History   DONEPEZIL HCL PO Take 10 mg by mouth daily 8/23/2017 at am Yes Unknown, Entered By History   loperamide (IMODIUM) 2 MG capsule Take 2 mg by mouth 2 times daily 8/23/2017 at am Yes Reported, Patient   aspirin 81 MG EC tablet Take 81 mg by mouth every evening  8/22/2017 at pm Yes Unknown, Entered By History   Cholecalciferol (VITAMIN D3) 1000 UNIT TABS Take 1,000 Units by mouth every evening  8/23/2017 at am Yes Unknown, Entered By History   Cyanocobalamin 1000 MCG TABS Take 1,000 mcg by mouth every evening  8/23/2017 at am Yes Unknown, Entered By History   atorvastatin (LIPITOR) 10 MG tablet Take 10 mg by mouth every morning  8/23/2017 at am Yes Unknown, Entered By History   irbesartan (AVAPRO) 300 MG tablet Take 300 mg by mouth daily. 8/23/2017 at am Yes Unknown, Entered By History   MetOLAZONE (ZAROXOLYN) 2.5 MG tablet Take 2.5 mg by mouth daily. 8/23/2017 at am Yes Unknown, Entered By History   potassium chloride (KLOR-CON) 10 MEQ CR tablet Take 10 mEq by mouth 3 times daily  8/23/2017 at am Yes Unknown, Entered By History   TRAZodone (DESYREL) 100 MG tablet Take 100 mg by mouth nightly as needed for sleep  Past Month at Unknown time Yes Unknown, Entered By History         ALLERGIES:  No known drug allergies.      SOCIAL HISTORY:  The patient currently lives with his wife in [place], [place], who is his primary caregiver.  He does not smoke tobacco on a regular basis.  He does not consume alcohol.      FAMILY HISTORY:  Reviewed and is noncontributory to present admission.       REVIEW OF SYSTEMS:  A 10-point review of systems was performed and is otherwise negative.  Please refer to the HPI.      PHYSICAL EXAMINATION:   VITAL SIGNS:  Reviewed in chart.   GENERAL:  Well-developed, well-nourished male who appears comfortable.   HEENT:  Head is normocephalic.  Pupils are equal, round, and reactive to light bilaterally.  EOMs are intact bilaterally.  Nose and mouth are patent.  Mucous membranes are moist.   LUNGS:  Clear to auscultation bilaterally without wheeze or crackles.   CARDIOVASCULAR:  Regular rate and rhythm, normal S1, S2, no murmur.   ABDOMEN:  Soft, nontender, nondistended, no guarding or rigidity.  Positive bowel sounds in all 4 quadrants.    EXTREMITIES:  The patient has spontaneous movement in bilateral upper and lower extremities.  Radial and pedal pulses are 2+ bilaterally.   strength is 5/5 bilaterally.  The patient is able to flex and dorsiflex bilateral feet.   SKIN:  Warm and dry, no rash, no pedal edema.      LABORATORY AND IMAGING DATA:  As reviewed and otherwise negative unless noted in HPI.      ASSESSMENT AND PLAN:  Chavez Mccain is a 76-year-old male with past medical history of benign prostatic hypertrophy with chronic urinary retention with Sequeira placement, obstructive sleep apnea using CPAP, diabetes mellitus type 2, hypertension, dyslipidemia as well as dementia, who presented as a direct admission from [facility], but due to wife's inability to care for him at home.  Given his generalized weakness and frequent falls, he will be admitted under observation status.   1.  Frequent falls with generalized weakness:  The patient recently spent 2 weeks at an assisted living facility where he was nonambulatory and therefore declined in terms of his functional status.  Workup at outlying facility was comprehensive and did not reveal any acute findings.  I have placed a consult for physical therapy,  as well as care  coordinator who will assist patient and his wife in determining a discharge plan.  In the morning the patient's wife anticipates arrival at approximately 9:00 a.m. to help facilitate discharge tomorrow.  They are hopeful that the patient can be discharged to [facility].   2.  Benign prostatic hypertrophy with chronic urinary retention and indwelling Sequeira.  Urinalysis at [facility] was without pyuria or indication of infection.  Continue indwelling Sequeira as per home regimen.  Continue prior to admission alfuzosin.   3.  Obstructive sleep apnea:  Continue CPAP with home settings.   4.  Diabetes mellitus type 2:  The patient's prior to admission glipizide has been resumed and he has also been placed on a medium insulin sliding scale with meals.   5.  Hypertension:  Continue prior to admission irbesartan.   6.  Frontal lobe dementia:  Continue prior to admission donepezil.   7.  Hyperlipidemia:  Holding prior to admission Lipitor given observation status.   8.  Deep venous thrombosis prophylaxis:  PCDs.      CODE STATUS:  DNR/DNI, which was confirmed with patient and wife.      This patient was staffed with Dr. Morgan Banegas who independently interviewed and evaluated the patient and is in agreement with the above-mentioned plan.         MORGAN BANEGAS MD       As dictated by HUGH ESTRADA PA-C            D: 2017 00:10   T: 2017 04:26   MT: lucy      Name:     HIPOLITO THOMPSON   MRN:      [id number]        Account:      [id number]   :      1940           Admitted:     [id number]      Document: [id number]

## 2017-08-24 NOTE — PLAN OF CARE
Problem: Goal Outcome Summary  Goal: Goal Outcome Summary  Outcome: No Change  Alert and oriented, disoriented to time, forgetful (baseline dementia), hard of hearing, right ear hear better than left,up with 2 assist/ walker/belt, VSS except /70, , denies pain, potassium 2.9 replaced, recheck 3.2, replace again with PO 40 mEq, next dose of 20 mEq due at 0845, sexton patent, moderate carb diet, rash around groin area, antifungal powder applied,prn trazodone given, will continue to monitor.

## 2017-08-24 NOTE — PHARMACY-ADMISSION MEDICATION HISTORY
Admission medication history interview status for the 8/23/2017  admission is complete. See EPIC admission navigator for prior to admission medications     Medication history source reliability:Moderate    Actions taken by pharmacist (provider contacted, etc):Reshma paged admitting MD    Additional medication history information not noted on PTA med list :on metolazone per wife    Medication reconciliation/reorder completed by provider prior to medication history? No    Time spent in this activity: 30min    Prior to Admission medications    Medication Sig Last Dose Taking? Auth Provider   miconazole (MICATIN) 2 % cream Apply topically 2 times daily 8/23/2017 at Unknown time Yes Unknown, Entered By History   CITALOPRAM HYDROBROMIDE PO Take 20 mg by mouth daily 8/23/2017 at am Yes Unknown, Entered By History   FINASTERIDE PO Take 5 mg by mouth daily 8/23/2017 at am Yes Unknown, Entered By History   Cephalexin (KEFLEX PO) Take 250 mg by mouth daily 8/23/2017 at am Yes Unknown, Entered By History   MAGNESIUM OXIDE PO Take 400 mg by mouth daily 8/23/2017 at am Yes Unknown, Entered By History   nystatin (MYCOSTATIN) 499183 UNIT/GM POWD Apply 1 g topically 2 times daily as needed Past Month at Unknown time Yes Unknown, Entered By History   DONEPEZIL HCL PO Take 10 mg by mouth daily 8/23/2017 at am Yes Unknown, Entered By History   loperamide (IMODIUM) 2 MG capsule Take 2 mg by mouth 2 times daily 8/23/2017 at am Yes Reported, Patient   GLIPIZIDE XL PO Take 5 mg by mouth daily (with breakfast)  8/23/2017 at am Yes Unknown, Entered By History   aspirin 81 MG EC tablet Take 81 mg by mouth every evening  8/22/2017 at pm Yes Unknown, Entered By History   Cholecalciferol (VITAMIN D3) 1000 UNIT TABS Take 1,000 Units by mouth every evening  8/23/2017 at am Yes Unknown, Entered By History   Cyanocobalamin 1000 MCG TABS Take 1,000 mcg by mouth every evening  8/23/2017 at am Yes Unknown, Entered By History   atorvastatin (LIPITOR) 10 MG  tablet Take 10 mg by mouth every morning  8/23/2017 at am Yes Unknown, Entered By History   irbesartan (AVAPRO) 300 MG tablet Take 300 mg by mouth daily. 8/23/2017 at am Yes Unknown, Entered By History   potassium chloride (KLOR-CON) 10 MEQ CR tablet Take 10 mEq by mouth 3 times daily  8/23/2017 at am Yes Unknown, Entered By History   TRAZodone (DESYREL) 100 MG tablet Take 100 mg by mouth nightly as needed for sleep  Past Month at Unknown time Yes Unknown, Entered By History   MetOLAZONE (ZAROXOLYN) 2.5 MG tablet Take 2.5 mg by mouth daily. 8/23 am  Unknown, Entered By History

## 2017-08-25 NOTE — PROGRESS NOTES
[facility]ay called and requested that the indication for each medication be added to the MAR. MELISA Barajas came and filled out indications, faxed over to shay

## 2019-06-15 ENCOUNTER — HOSPITAL ENCOUNTER (EMERGENCY)
Facility: CLINIC | Age: 79
Discharge: HOME | End: 2019-06-15
Attending: EMERGENCY MEDICINE | Admitting: EMERGENCY MEDICINE
Payer: MEDICARE

## 2019-06-15 ENCOUNTER — APPOINTMENT (OUTPATIENT)
Dept: ULTRASOUND IMAGING | Facility: CLINIC | Age: 79
End: 2019-06-15
Attending: EMERGENCY MEDICINE
Payer: MEDICARE

## 2019-06-15 VITALS
OXYGEN SATURATION: 93 % | TEMPERATURE: 98.4 F | WEIGHT: 260 LBS | RESPIRATION RATE: 16 BRPM | SYSTOLIC BLOOD PRESSURE: 150 MMHG | BODY MASS INDEX: 46.07 KG/M2 | HEART RATE: 85 BPM | HEIGHT: 63 IN | DIASTOLIC BLOOD PRESSURE: 64 MMHG

## 2019-06-15 DIAGNOSIS — M79.662 PAIN OF LEFT LOWER LEG: ICD-10-CM

## 2019-06-15 DIAGNOSIS — T83.511A URINARY TRACT INFECTION ASSOCIATED WITH INDWELLING URETHRAL CATHETER, INITIAL ENCOUNTER (H): ICD-10-CM

## 2019-06-15 DIAGNOSIS — N39.0 URINARY TRACT INFECTION ASSOCIATED WITH INDWELLING URETHRAL CATHETER, INITIAL ENCOUNTER (H): ICD-10-CM

## 2019-06-15 LAB
ALBUMIN SERPL-MCNC: 2.9 G/DL (ref 3.4–5)
ALBUMIN UR-MCNC: 30 MG/DL
ALP SERPL-CCNC: 91 U/L (ref 40–150)
ALT SERPL W P-5'-P-CCNC: 87 U/L (ref 0–70)
ANION GAP SERPL CALCULATED.3IONS-SCNC: 7 MMOL/L (ref 3–14)
APPEARANCE UR: ABNORMAL
AST SERPL W P-5'-P-CCNC: 72 U/L (ref 0–45)
BACTERIA #/AREA URNS HPF: ABNORMAL /HPF
BASOPHILS # BLD AUTO: 0.1 10E9/L (ref 0–0.2)
BASOPHILS NFR BLD AUTO: 0.9 %
BILIRUB SERPL-MCNC: 0.6 MG/DL (ref 0.2–1.3)
BILIRUB UR QL STRIP: NEGATIVE
BUN SERPL-MCNC: 22 MG/DL (ref 7–30)
CALCIUM SERPL-MCNC: 8.7 MG/DL (ref 8.5–10.1)
CHLORIDE SERPL-SCNC: 107 MMOL/L (ref 94–109)
CK SERPL-CCNC: 161 U/L (ref 30–300)
CO2 SERPL-SCNC: 25 MMOL/L (ref 20–32)
COLOR UR AUTO: YELLOW
CREAT SERPL-MCNC: 0.95 MG/DL (ref 0.66–1.25)
DIFFERENTIAL METHOD BLD: ABNORMAL
EOSINOPHIL # BLD AUTO: 0.2 10E9/L (ref 0–0.7)
EOSINOPHIL NFR BLD AUTO: 2.4 %
ERYTHROCYTE [DISTWIDTH] IN BLOOD BY AUTOMATED COUNT: 13.8 % (ref 10–15)
GFR SERPL CREATININE-BSD FRML MDRD: 75 ML/MIN/{1.73_M2}
GLUCOSE SERPL-MCNC: 267 MG/DL (ref 70–99)
GLUCOSE UR STRIP-MCNC: >1000 MG/DL
HCT VFR BLD AUTO: 35.7 % (ref 40–53)
HGB BLD-MCNC: 11.8 G/DL (ref 13.3–17.7)
HGB UR QL STRIP: ABNORMAL
IMM GRANULOCYTES # BLD: 0 10E9/L (ref 0–0.4)
IMM GRANULOCYTES NFR BLD: 0.3 %
KETONES UR STRIP-MCNC: NEGATIVE MG/DL
LEUKOCYTE ESTERASE UR QL STRIP: ABNORMAL
LYMPHOCYTES # BLD AUTO: 1.2 10E9/L (ref 0.8–5.3)
LYMPHOCYTES NFR BLD AUTO: 17 %
MCH RBC QN AUTO: 30.8 PG (ref 26.5–33)
MCHC RBC AUTO-ENTMCNC: 33.1 G/DL (ref 31.5–36.5)
MCV RBC AUTO: 93 FL (ref 78–100)
MONOCYTES # BLD AUTO: 0.5 10E9/L (ref 0–1.3)
MONOCYTES NFR BLD AUTO: 7.7 %
MUCOUS THREADS #/AREA URNS LPF: PRESENT /LPF
NEUTROPHILS # BLD AUTO: 5 10E9/L (ref 1.6–8.3)
NEUTROPHILS NFR BLD AUTO: 71.7 %
NITRATE UR QL: POSITIVE
NRBC # BLD AUTO: 0 10*3/UL
NRBC BLD AUTO-RTO: 0 /100
PH UR STRIP: 5.5 PH (ref 5–7)
PLATELET # BLD AUTO: 200 10E9/L (ref 150–450)
POTASSIUM SERPL-SCNC: 4.4 MMOL/L (ref 3.4–5.3)
PROT SERPL-MCNC: 6.9 G/DL (ref 6.8–8.8)
RBC # BLD AUTO: 3.83 10E12/L (ref 4.4–5.9)
RBC #/AREA URNS AUTO: >182 /HPF (ref 0–2)
SODIUM SERPL-SCNC: 139 MMOL/L (ref 133–144)
SOURCE: ABNORMAL
SP GR UR STRIP: 1.02 (ref 1–1.03)
UROBILINOGEN UR STRIP-MCNC: NORMAL MG/DL (ref 0–2)
WBC # BLD AUTO: 7 10E9/L (ref 4–11)
WBC #/AREA URNS AUTO: 127 /HPF (ref 0–5)
WBC CLUMPS #/AREA URNS HPF: PRESENT /HPF
YEAST #/AREA URNS HPF: ABNORMAL /HPF

## 2019-06-15 PROCEDURE — 87106 FUNGI IDENTIFICATION YEAST: CPT | Performed by: EMERGENCY MEDICINE

## 2019-06-15 PROCEDURE — 51702 INSERT TEMP BLADDER CATH: CPT

## 2019-06-15 PROCEDURE — 82550 ASSAY OF CK (CPK): CPT | Performed by: EMERGENCY MEDICINE

## 2019-06-15 PROCEDURE — 93970 EXTREMITY STUDY: CPT

## 2019-06-15 PROCEDURE — 81001 URINALYSIS AUTO W/SCOPE: CPT | Performed by: EMERGENCY MEDICINE

## 2019-06-15 PROCEDURE — 87186 SC STD MICRODIL/AGAR DIL: CPT | Performed by: EMERGENCY MEDICINE

## 2019-06-15 PROCEDURE — 85025 COMPLETE CBC W/AUTO DIFF WBC: CPT | Performed by: EMERGENCY MEDICINE

## 2019-06-15 PROCEDURE — 80053 COMPREHEN METABOLIC PANEL: CPT | Performed by: EMERGENCY MEDICINE

## 2019-06-15 PROCEDURE — 99285 EMERGENCY DEPT VISIT HI MDM: CPT | Mod: 25

## 2019-06-15 PROCEDURE — 87086 URINE CULTURE/COLONY COUNT: CPT | Performed by: EMERGENCY MEDICINE

## 2019-06-15 PROCEDURE — 87088 URINE BACTERIA CULTURE: CPT | Performed by: EMERGENCY MEDICINE

## 2019-06-15 RX ORDER — CEPHALEXIN 500 MG/1
500 CAPSULE ORAL 3 TIMES DAILY
Qty: 30 CAPSULE | Refills: 0 | Status: SHIPPED | OUTPATIENT
Start: 2019-06-15 | End: 2019-06-17

## 2019-06-15 ASSESSMENT — MIFFLIN-ST. JEOR: SCORE: 1789.48

## 2019-06-15 ASSESSMENT — ENCOUNTER SYMPTOMS: WEAKNESS: 1

## 2019-06-15 NOTE — ED PROVIDER NOTES
"  History     Chief Complaint:  Generalized Weakness    HPI   Chavez Mccain is a 78 year old male who presents to the emergency department today for evaluation of generalized weakness. The patient's wife states the patient is normally able to walk with his walker, but within the last couple of days she noticed he was dragging his left foot and was not able to walk, as well as bilateral leg swelling. She states the patient's physical therapist told her the patient was unable to finish his normal exercise routine with her. She also notes the patient's parkinson's medication dose was increased about a month ago.     Allergies:  No Known Allergies     Medications:    Aspirin  Atorvastatin  Donepezil   Finasteride  Glipizide  Irbesartan  Imodium   Metolazone  Trazodone     Past Medical History:    CKD  Diabetes     Past Surgical History:    Back surgery  ENT surgery  Knee surgery  Orthopedic surgery     Family History:    History reviewed. No pertinent family history.      Social History:  The patient was accompanied to the ED by his wife and son.  Smoking Status: Former Smoker  Smokeless Tobacco: Never Used  Alcohol Use: Positive    Drug use: Negative    Marital Status:       Review of Systems   Cardiovascular: Positive for leg swelling.   Musculoskeletal: Positive for gait problem.   Neurological: Positive for weakness.   All other systems reviewed and are negative.      Physical Exam     Patient Vitals for the past 24 hrs:   BP Temp Temp src Pulse Resp SpO2 Height Weight   06/15/19 1130 150/64 -- -- 85 16 93 % -- --   06/15/19 0929 146/61 98.4  F (36.9  C) Oral 85 18 94 % 1.6 m (5' 3\") 117.9 kg (260 lb)      Physical Exam  Vitals: reviewed by me  General: Pt seen on hospitals, [facility], cooperative, and alert to conversation, + [place]  Eyes: Tracking well, clear conjunctiva BL  ENT: MMM, midline trachea.   Lungs:   No tachypnea, no accessory muscle use. No respiratory distress.   CV: Rate as above, " regular rhythm.    Abd: Soft, non tender, no guarding, no rebound. Non distended  MSK:  Left lower extremity with significant 2+ edema, right lower extremity identical.  Left lower extremity with no joint effusion, no evidence of trauma and no skin breaks.  Skin: No rash, normal turgor and temperature  Neuro: Clear speech and no facial droop.  Left lower extremity with sensation intact light touch and 5 out of 5 motor to dorsiflexion plantar flexion knee extension knee flexion hip extension hip flexion.  Right lower extremity neurovascular intact with sensation intact light touch and 5 out of 5 motor throughout as well.  Psych: Not RIS, no e/o AH/VH      Emergency Department Course     Imaging:  Radiology findings were communicated with the patient who voiced understanding of the findings.    US Lower Extremity Venous Duplex Bilateral  No evidence of deep venous thrombosis.  RANCHO ELIAS MD  Reading per radiology     Laboratory:  Laboratory findings were communicated with the patient who voiced understanding of the findings.    CBC: WBC 7.0, HGB 11.8 (L),   CMP: Glucose 267 (H), Albumin 2.9 (L), ALT 87 (H), AST 72 (H) o/w WNL (Creatinine 0.95)  CK total: 161    UA with micro: Glucose >1000, Blood large, Protein albumin 30, Nitrite positive, Leukocyte esterase large, RBC >182 (H),  (H), WBC clumps present, bacteria many, yeast moderate, mucous present o/w negative   Urine Culture: Pending    Emergency Department Course:    0947 IV was inserted and blood was drawn for laboratory testing, results above.     0947 The patient provided a urine sample here in the emergency department. This was sent for laboratory testing, findings above.     1013 Nursing notes and vitals reviewed.    1015 I performed an exam of the patient as documented above.     1035 The patient was sent for a US lower extremity while in the emergency department, results above.      1249 I personally discussed the laboratory and imaging  results with the patient and his wife and answered all related questions prior to discharge    Impression & Plan      Medical Decision Making:  Chavez Mccain is a 78 year old male who presents to the emergency department with 3-4 days of left sided foot pain and possible weakness. He does have frontal lobe dementia, and it is difficult to melody clear history of what makes the pain better or worse, but here in the ER he has 5/5 strength in all aspects of his left lower extremity. He certainly has some edema, which I feel is likely from immobility as his ultrasound is negative for DVT. He has a normal CK, no evidence of myelopathy, normal vitals signs here and is acting appropriately per wife at the bedside who is his primary care taker, The patient transfers with help and I did offer admission here as the patient's needs with this new leg issue seem to be worsening but family states they feel very comfortable taking the patient home. Wife also notes additional family members coming in for vacation this week and she would like to go home and explore placement on her own, which I think is reasonable within the last week. Red flag signs to return to the ER discussed. This could be related to his restless leg syndrome, or could also be some type of peripheral neuropathy. I did given them a neurology referral for EMG and follow up.     Diagnosis:    ICD-10-CM    1. Pain of left lower leg M79.662    2. Urinary tract infection associated with indwelling urethral catheter, initial encounter (H) T83.511A     N39.0      Disposition:   The patient is discharged to home.     Discharge Medications:  CONTINUE these medicines which may have CHANGED, or have new prescriptions. If we are uncertain of the size of tablets/capsules you have at home, strength may be listed as something that might have changed.      Dose / Directions   cephALEXin 500 MG capsule  Commonly known as:  KEFLEX  This may have changed:      medication  strength    how much to take    when to take this      Dose:  500 mg  Take 1 capsule (500 mg) by mouth 3 times daily for 10 days  Quantity:  30 capsule  Refills:  0           Where to get your medicines      Some of these will need a paper prescription and others can be bought over the counter. Ask your nurse if you have questions.    Bring a paper prescription for each of these medications    cephALEXin 500 MG capsule        Scribe Disclosure:  I, Lyn Kurtz, am serving as a scribe at 10:15 AM on 6/15/2019 to document services personally performed by Spencer Duque MD based on my observations and the provider's statements to me.     EMERGENCY DEPARTMENT       Spencer Duque MD  06/15/19 1707

## 2019-06-15 NOTE — ED AVS SNAPSHOT
Emergency Department  [address] [facility] [address]  Phone:  [phone]  Fax:  [phone]                                    Chavez Mccain   MRN: [id number]    Department:   Emergency Department   Date of Visit:  6/15/2019           After Visit Summary Signature Page    I have received my discharge instructions, and my questions have been answered. I have discussed any challenges I see with this plan with the nurse or doctor.    ..........................................................................................................................................  Patient/Patient Representative Signature      ..........................................................................................................................................  Patient Representative Print Name and Relationship to Patient    ..................................................               ................................................  Date                                   Time    ..........................................................................................................................................  Reviewed by Signature/Title    ...................................................              ..............................................  Date                                               Time          22EPIC Rev 08/18

## 2019-06-15 NOTE — ED TRIAGE NOTES
Last few days, increased problems to walk over last 4 days. Weak, L foot weaker. Started ropinorol 0.25mg TID recently.

## 2019-06-17 LAB
BACTERIA SPEC CULT: ABNORMAL
BACTERIA SPEC CULT: ABNORMAL
Lab: ABNORMAL
SPECIMEN SOURCE: ABNORMAL

## 2021-07-11 ENCOUNTER — APPOINTMENT (OUTPATIENT)
Dept: GENERAL RADIOLOGY | Facility: CLINIC | Age: 81
End: 2021-07-11
Attending: EMERGENCY MEDICINE
Payer: COMMERCIAL

## 2021-07-11 ENCOUNTER — HOSPITAL ENCOUNTER (OUTPATIENT)
Facility: CLINIC | Age: 81
Setting detail: OBSERVATION
Discharge: NURSING, REHAB OR LONG-TERM CARE | End: 2021-07-14
Attending: EMERGENCY MEDICINE | Admitting: INTERNAL MEDICINE
Payer: COMMERCIAL

## 2021-07-11 ENCOUNTER — APPOINTMENT (OUTPATIENT)
Dept: CT IMAGING | Facility: CLINIC | Age: 81
End: 2021-07-11
Attending: EMERGENCY MEDICINE
Payer: COMMERCIAL

## 2021-07-11 DIAGNOSIS — Z97.8 PRESENCE OF INDWELLING FOLEY CATHETER: ICD-10-CM

## 2021-07-11 DIAGNOSIS — R53.1 WEAKNESS: ICD-10-CM

## 2021-07-11 DIAGNOSIS — N39.0 COMPLICATED UTI (URINARY TRACT INFECTION): Primary | ICD-10-CM

## 2021-07-11 DIAGNOSIS — R29.898 LEFT LEG WEAKNESS: ICD-10-CM

## 2021-07-11 DIAGNOSIS — R41.0 CONFUSION: ICD-10-CM

## 2021-07-11 LAB
ALBUMIN SERPL-MCNC: 2.9 G/DL (ref 3.4–5)
ALBUMIN UR-MCNC: 50 MG/DL
ALP SERPL-CCNC: 97 U/L (ref 40–150)
ALT SERPL W P-5'-P-CCNC: 60 U/L (ref 0–70)
ANION GAP SERPL CALCULATED.3IONS-SCNC: 2 MMOL/L (ref 3–14)
APPEARANCE UR: ABNORMAL
AST SERPL W P-5'-P-CCNC: 38 U/L (ref 0–45)
BACTERIA #/AREA URNS HPF: ABNORMAL /HPF
BASOPHILS # BLD AUTO: 0.1 10E3/UL (ref 0–0.2)
BASOPHILS NFR BLD AUTO: 1 %
BILIRUB SERPL-MCNC: 0.6 MG/DL (ref 0.2–1.3)
BILIRUB UR QL STRIP: NEGATIVE
BUN SERPL-MCNC: 18 MG/DL (ref 7–30)
CALCIUM SERPL-MCNC: 8.7 MG/DL (ref 8.5–10.1)
CHLORIDE BLD-SCNC: 109 MMOL/L (ref 94–109)
CO2 SERPL-SCNC: 30 MMOL/L (ref 20–32)
COLOR UR AUTO: ABNORMAL
CREAT SERPL-MCNC: 0.92 MG/DL (ref 0.66–1.25)
EOSINOPHIL # BLD AUTO: 0.2 10E3/UL (ref 0–0.7)
EOSINOPHIL NFR BLD AUTO: 3 %
ERYTHROCYTE [DISTWIDTH] IN BLOOD BY AUTOMATED COUNT: 13.9 % (ref 10–15)
ERYTHROCYTE [DISTWIDTH] IN BLOOD BY AUTOMATED COUNT: 13.9 % (ref 10–15)
GFR SERPL CREATININE-BSD FRML MDRD: 78 ML/MIN/1.73M2
GLUCOSE BLD-MCNC: 174 MG/DL (ref 70–99)
GLUCOSE BLDC GLUCOMTR-MCNC: 111 MG/DL (ref 70–99)
GLUCOSE BLDC GLUCOMTR-MCNC: 166 MG/DL (ref 70–99)
GLUCOSE UR STRIP-MCNC: NEGATIVE MG/DL
HBA1C MFR BLD: 5.8 % (ref 0–5.6)
HCT VFR BLD AUTO: 37.1 % (ref 40–53)
HCT VFR BLD AUTO: 37.1 % (ref 40–53)
HGB BLD-MCNC: 11.9 G/DL (ref 13.3–17.7)
HGB BLD-MCNC: 11.9 G/DL (ref 13.3–17.7)
HGB UR QL STRIP: ABNORMAL
IMM GRANULOCYTES # BLD: 0 10E3/UL
IMM GRANULOCYTES NFR BLD: 0 %
INTERPRETATION ECG - MUSE: NORMAL
KETONES UR STRIP-MCNC: NEGATIVE MG/DL
LEUKOCYTE ESTERASE UR QL STRIP: ABNORMAL
LYMPHOCYTES # BLD AUTO: 1 10E3/UL (ref 0.8–5.3)
LYMPHOCYTES NFR BLD AUTO: 16 %
MCH RBC QN AUTO: 30.4 PG (ref 26.5–33)
MCH RBC QN AUTO: 30.4 PG (ref 26.5–33)
MCHC RBC AUTO-ENTMCNC: 32.1 G/DL (ref 31.5–36.5)
MCHC RBC AUTO-ENTMCNC: 32.1 G/DL (ref 31.5–36.5)
MCV RBC AUTO: 95 FL (ref 78–100)
MCV RBC AUTO: 95 FL (ref 78–100)
MONOCYTES # BLD AUTO: 0.6 10E3/UL (ref 0–1.3)
MONOCYTES NFR BLD AUTO: 10 %
MUCOUS THREADS #/AREA URNS LPF: PRESENT /LPF
NEUTROPHILS # BLD AUTO: 4.5 10E3/UL (ref 1.6–8.3)
NEUTROPHILS NFR BLD AUTO: 70 %
NITRATE UR QL: NEGATIVE
NRBC # BLD AUTO: 0 10E3/UL
NRBC BLD AUTO-RTO: 0 /100
PH UR STRIP: 5.5 [PH] (ref 5–7)
PLATELET # BLD AUTO: 190 10E3/UL (ref 150–450)
PLATELET # BLD AUTO: 190 10E3/UL (ref 150–450)
POTASSIUM BLD-SCNC: 3.7 MMOL/L (ref 3.4–5.3)
PROCALCITONIN SERPL-MCNC: 0.08 NG/ML
PROT SERPL-MCNC: 7.1 G/DL (ref 6.8–8.8)
RBC # BLD AUTO: 3.91 10E6/UL (ref 4.4–5.9)
RBC # BLD AUTO: 3.91 10E6/UL (ref 4.4–5.9)
RBC URINE: 7 /HPF
SARS-COV-2 RNA RESP QL NAA+PROBE: NEGATIVE
SODIUM SERPL-SCNC: 141 MMOL/L (ref 133–144)
SP GR UR STRIP: 1.01 (ref 1–1.03)
SQUAMOUS EPITHELIAL: 1 /HPF
TROPONIN I SERPL-MCNC: <0.015 UG/L (ref 0–0.04)
UROBILINOGEN UR STRIP-MCNC: NORMAL MG/DL
WBC # BLD AUTO: 6.4 10E3/UL (ref 4–11)
WBC # BLD AUTO: 6.4 10E3/UL (ref 4–11)
WBC URINE: 29 /HPF
YEAST #/AREA URNS HPF: ABNORMAL /HPF

## 2021-07-11 PROCEDURE — 99285 EMERGENCY DEPT VISIT HI MDM: CPT | Mod: 25

## 2021-07-11 PROCEDURE — 51702 INSERT TEMP BLADDER CATH: CPT

## 2021-07-11 PROCEDURE — G0378 HOSPITAL OBSERVATION PER HR: HCPCS

## 2021-07-11 PROCEDURE — 84145 PROCALCITONIN (PCT): CPT | Performed by: EMERGENCY MEDICINE

## 2021-07-11 PROCEDURE — 85018 HEMOGLOBIN: CPT | Performed by: EMERGENCY MEDICINE

## 2021-07-11 PROCEDURE — 87040 BLOOD CULTURE FOR BACTERIA: CPT | Performed by: EMERGENCY MEDICINE

## 2021-07-11 PROCEDURE — 82247 BILIRUBIN TOTAL: CPT | Performed by: EMERGENCY MEDICINE

## 2021-07-11 PROCEDURE — 87106 FUNGI IDENTIFICATION YEAST: CPT | Performed by: EMERGENCY MEDICINE

## 2021-07-11 PROCEDURE — 99219 PR INITIAL OBSERVATION CARE,LEVEL II: CPT | Performed by: INTERNAL MEDICINE

## 2021-07-11 PROCEDURE — 81001 URINALYSIS AUTO W/SCOPE: CPT | Performed by: EMERGENCY MEDICINE

## 2021-07-11 PROCEDURE — 96361 HYDRATE IV INFUSION ADD-ON: CPT

## 2021-07-11 PROCEDURE — 96365 THER/PROPH/DIAG IV INF INIT: CPT

## 2021-07-11 PROCEDURE — 82962 GLUCOSE BLOOD TEST: CPT

## 2021-07-11 PROCEDURE — 85025 COMPLETE CBC W/AUTO DIFF WBC: CPT | Performed by: EMERGENCY MEDICINE

## 2021-07-11 PROCEDURE — 36592 COLLECT BLOOD FROM PICC: CPT | Performed by: EMERGENCY MEDICINE

## 2021-07-11 PROCEDURE — 87086 URINE CULTURE/COLONY COUNT: CPT | Performed by: EMERGENCY MEDICINE

## 2021-07-11 PROCEDURE — 87635 SARS-COV-2 COVID-19 AMP PRB: CPT | Performed by: EMERGENCY MEDICINE

## 2021-07-11 PROCEDURE — 250N000011 HC RX IP 250 OP 636: Performed by: EMERGENCY MEDICINE

## 2021-07-11 PROCEDURE — 36415 COLL VENOUS BLD VENIPUNCTURE: CPT | Performed by: EMERGENCY MEDICINE

## 2021-07-11 PROCEDURE — 250N000013 HC RX MED GY IP 250 OP 250 PS 637: Mod: GY | Performed by: INTERNAL MEDICINE

## 2021-07-11 PROCEDURE — 84484 ASSAY OF TROPONIN QUANT: CPT | Performed by: EMERGENCY MEDICINE

## 2021-07-11 PROCEDURE — 82040 ASSAY OF SERUM ALBUMIN: CPT | Performed by: EMERGENCY MEDICINE

## 2021-07-11 PROCEDURE — 83036 HEMOGLOBIN GLYCOSYLATED A1C: CPT | Performed by: INTERNAL MEDICINE

## 2021-07-11 PROCEDURE — 250N000012 HC RX MED GY IP 250 OP 636 PS 637: Mod: GY | Performed by: INTERNAL MEDICINE

## 2021-07-11 PROCEDURE — C9803 HOPD COVID-19 SPEC COLLECT: HCPCS

## 2021-07-11 PROCEDURE — 70450 CT HEAD/BRAIN W/O DYE: CPT

## 2021-07-11 PROCEDURE — 96372 THER/PROPH/DIAG INJ SC/IM: CPT | Mod: 59 | Performed by: INTERNAL MEDICINE

## 2021-07-11 PROCEDURE — 258N000003 HC RX IP 258 OP 636: Performed by: EMERGENCY MEDICINE

## 2021-07-11 PROCEDURE — 93005 ELECTROCARDIOGRAM TRACING: CPT | Mod: 59

## 2021-07-11 PROCEDURE — 71046 X-RAY EXAM CHEST 2 VIEWS: CPT

## 2021-07-11 RX ORDER — ROPINIROLE 0.5 MG/1
0.5 TABLET, FILM COATED ORAL AT BEDTIME
Status: DISCONTINUED | OUTPATIENT
Start: 2021-07-11 | End: 2021-07-14 | Stop reason: HOSPADM

## 2021-07-11 RX ORDER — ONDANSETRON 4 MG/1
4 TABLET, ORALLY DISINTEGRATING ORAL EVERY 6 HOURS PRN
Status: DISCONTINUED | OUTPATIENT
Start: 2021-07-11 | End: 2021-07-14 | Stop reason: HOSPADM

## 2021-07-11 RX ORDER — LEVOFLOXACIN 500 MG/1
500 TABLET, FILM COATED ORAL EVERY 24 HOURS
Status: DISCONTINUED | OUTPATIENT
Start: 2021-07-12 | End: 2021-07-14 | Stop reason: HOSPADM

## 2021-07-11 RX ORDER — HYDROXYZINE HYDROCHLORIDE 25 MG/1
25 TABLET, FILM COATED ORAL 3 TIMES DAILY PRN
COMMUNITY

## 2021-07-11 RX ORDER — LEVOFLOXACIN 5 MG/ML
750 INJECTION, SOLUTION INTRAVENOUS ONCE
Status: COMPLETED | OUTPATIENT
Start: 2021-07-11 | End: 2021-07-11

## 2021-07-11 RX ORDER — CIPROFLOXACIN 250 MG/1
250 TABLET, FILM COATED ORAL 2 TIMES DAILY
Status: ON HOLD | COMMUNITY
Start: 2021-07-09 | End: 2021-07-13

## 2021-07-11 RX ORDER — ROPINIROLE 0.25 MG/1
0.25 TABLET, FILM COATED ORAL 3 TIMES DAILY
Status: DISCONTINUED | OUTPATIENT
Start: 2021-07-11 | End: 2021-07-14 | Stop reason: HOSPADM

## 2021-07-11 RX ORDER — TRIAMCINOLONE ACETONIDE 0.25 MG/G
1 CREAM TOPICAL 2 TIMES DAILY
Status: ON HOLD | COMMUNITY
End: 2022-01-17

## 2021-07-11 RX ORDER — ROPINIROLE 0.25 MG/1
0.25 TABLET, FILM COATED ORAL 3 TIMES DAILY
COMMUNITY

## 2021-07-11 RX ORDER — ACETAMINOPHEN 325 MG/1
975 TABLET ORAL EVERY 8 HOURS
Status: DISCONTINUED | OUTPATIENT
Start: 2021-07-11 | End: 2021-07-14 | Stop reason: HOSPADM

## 2021-07-11 RX ORDER — HYDRALAZINE HYDROCHLORIDE 25 MG/1
25 TABLET, FILM COATED ORAL EVERY 6 HOURS PRN
Status: DISCONTINUED | OUTPATIENT
Start: 2021-07-11 | End: 2021-07-14 | Stop reason: HOSPADM

## 2021-07-11 RX ORDER — DONEPEZIL HYDROCHLORIDE 10 MG/1
10 TABLET, FILM COATED ORAL DAILY
Status: DISCONTINUED | OUTPATIENT
Start: 2021-07-12 | End: 2021-07-14 | Stop reason: HOSPADM

## 2021-07-11 RX ORDER — GLIPIZIDE 5 MG/1
5 TABLET, FILM COATED, EXTENDED RELEASE ORAL DAILY
Status: ON HOLD | COMMUNITY
End: 2022-01-17

## 2021-07-11 RX ORDER — NICOTINE POLACRILEX 4 MG
15-30 LOZENGE BUCCAL
Status: DISCONTINUED | OUTPATIENT
Start: 2021-07-11 | End: 2021-07-14 | Stop reason: HOSPADM

## 2021-07-11 RX ORDER — GLIPIZIDE 5 MG/1
5 TABLET, FILM COATED, EXTENDED RELEASE ORAL DAILY
Status: DISCONTINUED | OUTPATIENT
Start: 2021-07-12 | End: 2021-07-14 | Stop reason: HOSPADM

## 2021-07-11 RX ORDER — METFORMIN HCL 500 MG
1000 TABLET, EXTENDED RELEASE 24 HR ORAL DAILY
Status: ON HOLD | COMMUNITY
End: 2022-01-17

## 2021-07-11 RX ORDER — IRBESARTAN 150 MG/1
300 TABLET ORAL DAILY
Status: DISCONTINUED | OUTPATIENT
Start: 2021-07-12 | End: 2021-07-14 | Stop reason: HOSPADM

## 2021-07-11 RX ORDER — CITALOPRAM HYDROBROMIDE 20 MG/1
20 TABLET ORAL DAILY
Status: DISCONTINUED | OUTPATIENT
Start: 2021-07-12 | End: 2021-07-14 | Stop reason: HOSPADM

## 2021-07-11 RX ORDER — DEXTROSE MONOHYDRATE 25 G/50ML
25-50 INJECTION, SOLUTION INTRAVENOUS
Status: DISCONTINUED | OUTPATIENT
Start: 2021-07-11 | End: 2021-07-14 | Stop reason: HOSPADM

## 2021-07-11 RX ORDER — ATORVASTATIN CALCIUM 20 MG/1
20 TABLET, FILM COATED ORAL EVERY MORNING
Status: DISCONTINUED | OUTPATIENT
Start: 2021-07-12 | End: 2021-07-14 | Stop reason: HOSPADM

## 2021-07-11 RX ORDER — NYSTATIN 100000 [USP'U]/G
1 POWDER TOPICAL 2 TIMES DAILY PRN
Status: DISCONTINUED | OUTPATIENT
Start: 2021-07-11 | End: 2021-07-13

## 2021-07-11 RX ORDER — ROPINIROLE 0.5 MG/1
0.5 TABLET, FILM COATED ORAL AT BEDTIME
COMMUNITY

## 2021-07-11 RX ORDER — FINASTERIDE 5 MG/1
5 TABLET, FILM COATED ORAL DAILY
Status: DISCONTINUED | OUTPATIENT
Start: 2021-07-12 | End: 2021-07-14 | Stop reason: HOSPADM

## 2021-07-11 RX ORDER — METFORMIN HCL 500 MG
1000 TABLET, EXTENDED RELEASE 24 HR ORAL DAILY
Status: DISCONTINUED | OUTPATIENT
Start: 2021-07-12 | End: 2021-07-14 | Stop reason: HOSPADM

## 2021-07-11 RX ORDER — NITROFURANTOIN MACROCRYSTALS 50 MG/1
50 CAPSULE ORAL DAILY
Status: ON HOLD | COMMUNITY
End: 2021-07-13

## 2021-07-11 RX ORDER — FINASTERIDE 5 MG/1
5 TABLET, FILM COATED ORAL DAILY
Status: ON HOLD | COMMUNITY
End: 2022-01-17

## 2021-07-11 RX ORDER — SODIUM CHLORIDE 9 MG/ML
INJECTION, SOLUTION INTRAVENOUS CONTINUOUS
Status: DISCONTINUED | OUTPATIENT
Start: 2021-07-11 | End: 2021-07-14 | Stop reason: HOSPADM

## 2021-07-11 RX ORDER — ONDANSETRON 2 MG/ML
4 INJECTION INTRAMUSCULAR; INTRAVENOUS EVERY 6 HOURS PRN
Status: DISCONTINUED | OUTPATIENT
Start: 2021-07-11 | End: 2021-07-14 | Stop reason: HOSPADM

## 2021-07-11 RX ADMIN — ROPINIROLE HYDROCHLORIDE 0.5 MG: 0.5 TABLET, FILM COATED ORAL at 22:38

## 2021-07-11 RX ADMIN — ROPINIROLE HYDROCHLORIDE 0.25 MG: 0.25 TABLET, FILM COATED ORAL at 20:00

## 2021-07-11 RX ADMIN — INSULIN ASPART 1 UNITS: 100 INJECTION, SOLUTION INTRAVENOUS; SUBCUTANEOUS at 19:04

## 2021-07-11 RX ADMIN — SODIUM CHLORIDE 1000 ML: 9 INJECTION, SOLUTION INTRAVENOUS at 15:38

## 2021-07-11 RX ADMIN — ACETAMINOPHEN 975 MG: 325 TABLET, FILM COATED ORAL at 18:51

## 2021-07-11 RX ADMIN — LEVOFLOXACIN 750 MG: 5 INJECTION, SOLUTION INTRAVENOUS at 15:42

## 2021-07-11 RX ADMIN — SODIUM CHLORIDE: 9 INJECTION, SOLUTION INTRAVENOUS at 17:27

## 2021-07-11 ASSESSMENT — MIFFLIN-ST. JEOR: SCORE: 1763.15

## 2021-07-11 NOTE — PHARMACY-ADMISSION MEDICATION HISTORY
Pharmacy Medication History  Admission medication history interview status for the 7/11/2021  admission is complete. See EPIC admission navigator for prior to admission medications     Location of Interview: Outside patient room but on unit  Medication history sources: Patient's family/friend (wife), Surescripts and Patient's home med list    Significant changes made to the medication list:  Changed atorvastatin strength, changed glipizide strength, changed vit D strength    Added metformin, nitrofurantoin, ciprofloxacin, ropinorole, triamcinolone, hydroxyzine  Deleted aspirin, metalozone, trazodone     In the past week, patient estimated taking medication this percent of the time: greater than 90%    Additional medication history information:   Wife brought a med list, she helps the patient with his meds and gives them according to the sig on the bottle, when inquired about last doses wife reported that he took his morning meds but was unable to recall which ones.  Patient has an rx for cipro started 7/9/21    Medication reconciliation completed by provider prior to medication history? No    Time spent in this activity: 35min    Prior to Admission medications    Medication Sig Last Dose Taking? Auth Provider   atorvastatin (LIPITOR) 20 MG tablet Take 20 mg by mouth every morning  Past Week at Unknown time Yes Unknown, Entered By History   Cholecalciferol (VITAMIN D3) 1000 UNIT TABS Take 5,000 Units by mouth every evening  Past Week at Unknown time Yes Unknown, Entered By History   ciprofloxacin (CIPRO) 250 MG tablet Take 250 mg by mouth 2 times daily Past Week at Unknown time Yes Unknown, Entered By History   citalopram (CELEXA) 20 MG tablet Take 20 mg by mouth daily  Past Week at Unknown time Yes Unknown, Entered By History   donepezil (ARICEPT) 10 MG tablet Take 10 mg by mouth daily  Past Week at Unknown time Yes Unknown, Entered By History   finasteride (PROSCAR) 5 MG tablet Take 5 mg by mouth daily Past Week at  Unknown time Yes Unknown, Entered By History   glipiZIDE (GLUCOTROL XL) 5 MG 24 hr tablet Take 5 mg by mouth daily Past Week at Unknown time Yes Unknown, Entered By History   hydrOXYzine (ATARAX) 25 MG tablet Take 25 mg by mouth 3 times daily as needed for itching Past Week at Unknown time Yes Unknown, Entered By History   irbesartan (AVAPRO) 300 MG tablet Take 300 mg by mouth daily. Past Week at Unknown time Yes Unknown, Entered By History   loperamide (IMODIUM) 2 MG capsule Take 2 mg by mouth 2 times daily Past Week at Unknown time Yes Reported, Patient   metFORMIN (GLUCOPHAGE-XR) 500 MG 24 hr tablet Take 1,000 mg by mouth daily Past Week at Unknown time Yes Unknown, Entered By History   miconazole (MICATIN) 2 % cream Apply topically 2 times daily prn at prn Yes Unknown, Entered By History   nitroFURantoin macrocrystal (MACRODANTIN) 50 MG capsule Take 50 mg by mouth daily Past Week at Unknown time Yes Unknown, Entered By History   nystatin (MYCOSTATIN) 017224 UNIT/GM POWD Apply 1 g topically 2 times daily as needed prn at prn Yes Unknown, Entered By History   rOPINIRole (REQUIP) 0.25 MG tablet Take 0.25 mg by mouth 3 times daily Past Week at Unknown time Yes Unknown, Entered By History   rOPINIRole (REQUIP) 0.5 MG tablet Take 0.5 mg by mouth At Bedtime Past Week at Unknown time Yes Unknown, Entered By History   triamcinolone (KENALOG) 0.025 % cream Apply 1 applicator topically 2 times daily Past Week at Unknown time Yes Unknown, Entered By History       The information provided in this note is only as accurate as the sources available at the time of update(s)

## 2021-07-11 NOTE — PROGRESS NOTES
RECEIVING UNIT ED HANDOFF REVIEW    ED Nurse Handoff Report was reviewed by: Mag Curry RN on July 11, 2021 at 4:13 PM

## 2021-07-11 NOTE — ED PROVIDER NOTES
History   Chief Complaint:  Generalized Weakness     HPI History supplemented by electronic chart review  History limited by dementia, and supplemented by wife at bedside.    Chavez Mccain is a 80 year old male with history of dementia, chronic kidney disease and diabetes mellitus who presents with weakness. Patient's wife reports that the patient's urine began looking dark 2 days ago so they called his primary  doctor who prescribed Cipro 250 twice daily to treat suspected urinary tract infection.  No recent urine studies have been performed, according to his wife.  Since then he has had increasing weakness over the last couple of days.  He normally uses a walker and was unable to pivot today. He was not using his left leg as he normally does, and wife states concerned that his left leg may be newly weak, possibly from a stroke, though she thinks it is doing better now. He usually feels better shortly after receiving antibiotics. He has also had increased hallucinations over the night. His wife also reports a decreased appetite. Patient has had an indwelling catheter for the last 10 years due to a loss of bladder muscle function. He receives monthly changes and is due for a change this week. The catheter has continued draining.     Review of Systems   Unable to perform ROS: Dementia     Allergies:  No Known Drug Allergies    Medications:  Aspirin 81 mg  Lipitor  Citalopram  Donepezil  Finasteride  Avapro  Imodium  Zaroxolyn  Mycostatin  Desyrel    Past Medical History:    Chronic kidney disease  Diabetes mellitus     Past Surgical History:    ENT surgery  Back surgery  Knee surgery    Social History:  Presents with his wife      Physical Exam     Patient Vitals for the past 24 hrs:   BP Temp Temp src Pulse Resp SpO2 Weight   07/11/21 1238 (!) 176/64 97.8  F (36.6  C) Oral 85 18 93 % 117.9 kg (260 lb)     Physical Exam  General: Chronically ill-appearing male sitting upright in room 11,  wife at bedside  HENT: mucous membranes moist, OP clear, face nontender  CV: rate as above, regular rhythm, no murmur audible  Resp: normal effort, speaks in full phrases, no stridor, no cough observed  GI: abdomen soft and nontender, protuberant, no guarding  18 [place] Sequeira catheter in place draining yellow urine into leg bag  Nontender external genitalia  MSK: no bony tenderness, no CVAT  Skin: appropriately warm and dry  Neuro: alert, clear speech, poor historian, no nuchal rigidity,  equal, lifts both legs off bed, no appreciable focal weakness  Psych: cooperative, no evidence of active hallucinations at this time      Emergency Department Course   ECG  ECG taken at 1326, ECG read at 1332  Weakness   Left axis deviation as compared to prior, dated 12/6/11.  Rate 76 bpm. ME interval 196 ms. QRS duration 94 ms. QT/QTc 356/400 ms. P-R-T axes 71 -47 75. Normal sinus rhythm. Left axis deviation. Cannot rule out anterior infarct.      Imaging:  XR Chest 2 Views  1. Subtle density in the left lateral inferior base could represent   atelectasis, infiltrate, or effusion.   2. Top normal sized cardiac silhouette is likely due to the AP   technique.   3. Thoracic aortic calcifications.   4. No other evidence of acute cardiopulmonary disease is seen.   As read by Radiology.      Head CT w/o Contrast  IMPRESSION:  1. No acute process intracranially.  2. Chronic ischemic changes and parenchymal volume loss as above have  progressed from 2/26/2015.  3. Postoperative changes left-sided mastoid air cells as before.  4. See above for full description.    As read by radiology.      Laboratory:   CBC: WBC 6.4, HGB 11.9 (L),    CMP: anion gap 2 (L), glucose 174 (H), albumin 2.9 (L) o/w WNL (Creatinine 0.92)      Troponin (Collected 1359): <0.015     Blood Culture x2: Pending     Procalcitonin: pending    Asymptomatic COVID-19 Virus (Coronavirus) by PCR: pending     UA: +pyuria  UCx: pending    Emergency Department  Course:    Reviewed:  I reviewed nursing notes, vitals, past medical history and care everywhere    Assessments:  1310 I obtained history and examined the patient as noted above.   1403 I rechecked the patient.     Consults:   1510 I spoke with Dr. Mendoza of the hospitalist service.     Interventions:  Levofloxacin 750mg IV    Disposition:  The patient was admitted to the hospital under the care of Dr. Mendoza.     Impression & Plan   Medical Decision Making:  A broad variety of potential causes of his multiple symptoms was considered including not only infection but also intracranial hemorrhage, stroke, medication effect and numerous others.  His underlying dementia made his evaluation more challenging, though his wife at bedside is quite involved and helpful in his assessment.  Work-up here notable for modest pyuria, difficult to interpret in light of his chronic indwelling Sequeira.  His urinalysis and culture were sent from a fresh Sequeira catheter placed by his ED nurse today.  Additionally, chest x-ray shows the possibility of pneumonia, for which the accepting hospitalist and I agreed to initiate levofloxacin.  He is not hypoxic nor in respiratory distress.  Given his confusion and the fact that his wife can no longer care for him safely at home at this time, he will be hospitalized for further care.  MRI can be considered to more definitively evaluate for the possibility of stroke.    Covid-19  Chavez Mccain was evaluated during a global COVID-19 pandemic, which necessitated consideration that the patient might be at risk for infection with the SARS-CoV-2 virus that causes COVID-19.   Applicable protocols for evaluation were followed during the patient's care.   COVID-19 was considered as part of the patient's evaluation. The plan for testing is:  a test was obtained during this visit.    Diagnosis:    ICD-10-CM    1. Weakness  R53.1    2. Confusion  R41.0    3. Left leg weakness  R29.898    4. Presence of  indwelling Sequeira catheter  Z97.8        Scribe Disclosure:  I, Dana Infanteumacher, am serving as a scribe at 1:05 PM on 7/11/2021 to document services personally performed by Thor Jackson MD based on my observations and the provider's statements to me.     This note was completed in part using Dragon voice recognition software. Although reviewed after completion, some word and grammatical errors may occur.             Thor Jackson MD  07/11/21 1545

## 2021-07-11 NOTE — ED NOTES
[facility]  ED Nurse Handoff Report    ED Chief complaint: Generalized Weakness      ED Diagnosis:   Final diagnoses:   None       Code Status: Not addressed by ED MD.    Allergies: No Known Allergies    Patient Story: Patient lives in [facility] with wife. Wife is patient's primary caregiver and patient has home health nurse visits regularly. Patient saw carly MERRITT 2 days ago and was started on Cipro. Patient in with increase weakness, and increase in confusion and having hallucinations last night per wife. Patient has frontal lobe dementia.  Focused Assessment:  Patient in with increased infusion and weakness. Patient was unable to ambulate in his typical fashion at home with his walker and was unable to get out of bed.    Treatments and/or interventions provided: Labs and imaging performed in ED. Sequeira catheter replaced  Patient's response to treatments and/or interventions: Patient remains stable    To be done/followed up on inpatient unit:  Continue to monitor.    Does this patient have any cognitive concerns?: Baseline dementia and with increased confusion and hallucinations per wife.    Activity level - Baseline/Home:  Independent and Walker  Activity Level - Current:   Wheelchair and Total Care    Patient's Preferred language: English   Needed?: No    Isolation: None  Infection: Not Applicable  Patient tested for COVID 19 prior to admission: YES  Bariatric?: No    Vital Signs:   Vitals:    07/11/21 1238   BP: (!) 176/64   Pulse: 85   Resp: 18   Temp: 97.8  F (36.6  C)   TempSrc: Oral   SpO2: 93%   Weight: 117.9 kg (260 lb)       Cardiac Rhythm:     Was the PSS-3 completed:   Yes  What interventions are required if any?               Family Comments: Wife at bedside.  OBS brochure/video discussed/provided to patient/family: No              Name of person given brochure if not patient: N/A              Relationship to patient: N/A    For the majority of the shift this patient's behavior  rodo Negro.   Behavioral interventions performed were none.    ED NURSE PHONE NUMBER: [phone]

## 2021-07-11 NOTE — H&P
[facility]    History and Physical  Hospitalist       Date of Admission:  7/11/2021    Assessment & Plan   Chavez Mccain is a 80 year old male with PMH of frontotemporal dementia, chronic indwelling sexton, CKD, DM2, HLD, HTN, who presents with urinary tract infection.    Catheter associated urinary tract infection  History of recurrent UTIs  Acute infectious encephalopathy  Patient with history of UTIs associated with chronic indwelling sexton catheter. Prior urine cultures from 2019, 2017, 2015 have shown fluoroquinolone sensitivity. Developed typical foul and dark urine two to three days ago and started on cipro as outpatient. Symptoms then progressed and patient developed increasing weakness, confusion, possible hallucinations. Wife is unable to take care of patient. Patient is mildly encephalopathic here in the emergency department. CXR shows possible LLL infiltrate. UA continues to have pyuria. No leukocytosis. Ciprofloxacin is likely appropriate here however it is sometimes associated with neurological symptoms. On the other hand, wife reports that patient has tolerated UTIs for ciprofloxacin before, and most likely his confusion is related to his UTI not antibiotics.  - ED gave Levofloxacin as he does have also a lung infiltrate. Continue levofloxacin  - Scheduled tylenol, monitor for delirium  - If confusion, encephalopathy persist consider alternative to fluoroquinolone  - Pending urine culture for final antibiotic recs  - PT/OT ordered, assess for TCU    Frontotemporal dementia  - Continue PTA Requip, donepezil, Celexa    CKD: Creatinine at baseline 0.9    DM2  - Continue PTA metformin and glipizide  - MDSSI  - Monitor for hypoglycemia while on fluoroquinolones    HLD, HTN  /77 here  - Hydralazine PO PRN for SBP > 180  - Continue PTA irbesartan    COVID-19 ASYMPTOMATIC testing: Patient does not have any abnormal respiratory symptoms and is considered LOW SUSPICION for  COVID.    DVT Prophylaxis: Pneumatic Compression Devices  Code Status: DNR / DNI    Disposition: Expected discharge 1-2 days    Lul Mendoza MD    Primary Care Physician   [facility]    Chief Complaint   Urinary tract infection      History is obtained from the patient and his wife Maria Dolores  Case discussed with ED provider    History of Present Illness   Chavez Mccain is a 80 year old male who presents with urinary tract infection. He has a chronic indwelling sexton for over 10 years and history of multiple UTIs. About two to three days ago he developed foul smelling and dark urine that is typical of his UTIs, and was prescribed ciprofloxacin. Then yesterday, he started having progressive weakness as well as increased confusion, agitation--he appeared to be halluacinating at times. His wife Maria Dolores reports that these symptoms are worse than his usual UTI and that she is unable to take care of him at home. Patient denies any fevers or chills. He is tangential at times, and mildly confused. He denies any pain.    Past Medical History    I have reviewed this patient's medical history and updated it with pertinent information if needed.   Past Medical History:   Diagnosis Date     Chronic kidney disease     renal failure     Diabetes mellitus (H)        Past Surgical History   I have reviewed this patient's surgical history and updated it with pertinent information if needed.  Past Surgical History:   Procedure Laterality Date     BACK SURGERY       ENT SURGERY       hemorirroid       KNEE SURGERY       ORTHOPEDIC SURGERY         Prior to Admission Medications   Prior to Admission Medications   Prescriptions Last Dose Informant Patient Reported? Taking?   Cholecalciferol (VITAMIN D3) 1000 UNIT TABS Past Week at Unknown time Spouse/Significant Other Yes Yes   Sig: Take 5,000 Units by mouth every evening    atorvastatin (LIPITOR) 20 MG tablet Past Week at Unknown time  Spouse/Significant Other Yes Yes   Sig: Take 20 mg by mouth every morning    ciprofloxacin (CIPRO) 250 MG tablet Past Week at Unknown time  Yes Yes   Sig: Take 250 mg by mouth 2 times daily   citalopram (CELEXA) 20 MG tablet Past Week at Unknown time Spouse/Significant Other Yes Yes   Sig: Take 20 mg by mouth daily    donepezil (ARICEPT) 10 MG tablet Past Week at Unknown time Spouse/Significant Other Yes Yes   Sig: Take 10 mg by mouth daily    finasteride (PROSCAR) 5 MG tablet Past Week at Unknown time  Yes Yes   Sig: Take 5 mg by mouth daily   glipiZIDE (GLUCOTROL XL) 5 MG 24 hr tablet Past Week at Unknown time  Yes Yes   Sig: Take 5 mg by mouth daily   hydrOXYzine (ATARAX) 25 MG tablet Past Week at Unknown time  Yes Yes   Sig: Take 25 mg by mouth 3 times daily as needed for itching   irbesartan (AVAPRO) 300 MG tablet Past Week at Unknown time Spouse/Significant Other Yes Yes   Sig: Take 300 mg by mouth daily.   loperamide (IMODIUM) 2 MG capsule Past Week at Unknown time Spouse/Significant Other Yes Yes   Sig: Take 2 mg by mouth 2 times daily   metFORMIN (GLUCOPHAGE-XR) 500 MG 24 hr tablet Past Week at Unknown time  Yes Yes   Sig: Take 1,000 mg by mouth daily   miconazole (MICATIN) 2 % cream prn at prn Spouse/Significant Other Yes Yes   Sig: Apply topically 2 times daily   nitroFURantoin macrocrystal (MACRODANTIN) 50 MG capsule Past Week at Unknown time  Yes Yes   Sig: Take 50 mg by mouth daily   nystatin (MYCOSTATIN) 419818 UNIT/GM POWD prn at prn Spouse/Significant Other Yes Yes   Sig: Apply 1 g topically 2 times daily as needed   rOPINIRole (REQUIP) 0.25 MG tablet Past Week at Unknown time  Yes Yes   Sig: Take 0.25 mg by mouth 3 times daily   rOPINIRole (REQUIP) 0.5 MG tablet Past Week at Unknown time  Yes Yes   Sig: Take 0.5 mg by mouth At Bedtime   triamcinolone (KENALOG) 0.025 % cream Past Week at Unknown time  Yes Yes   Sig: Apply 1 applicator topically 2 times daily      Facility-Administered Medications:  None     Allergies   No Known Allergies    Social History   I have reviewed this patient's social history and updated it with pertinent information if needed. Chavez Mccain  reports that he has quit smoking. He does not have any smokeless tobacco history on file. He reports current alcohol use of about 0.8 standard drinks of alcohol per week. He reports that he does not use drugs.    Family History   I have reviewed this patient's family history and updated it with pertinent information if needed.   Noncontributory    Review of Systems   The 10 point Review of Systems is negative other than noted in the HPI or here.    Physical Exam   Temp: 97.8  F (36.6  C) Temp src: Oral BP: (!) 175/77 Pulse: 67   Resp: 20 SpO2: 94 % O2 Device: None (Room air)    Vital Signs with Ranges  Temp:  [97.8  F (36.6  C)] 97.8  F (36.6  C)  Pulse:  [67-85] 67  Resp:  [18-20] 20  BP: (175-176)/(64-77) 175/77  SpO2:  [93 %-94 %] 94 %  260 lbs 0 oz    Constitutional: Obese male in NAD  Eyes: Nonicteric, normal ocular movements  HEENT: Normocephalic, atraumatic, oral mucosa moist  Respiratory: CTAB, no wheezing or crackles  Cardiovascular: RRR, normal S1/2, no m/r/g  GI: Nontender, nondistended  Vascular: No lower extremity pitting edema  : Sequeira with clear urine output  Skin: No rashes  Musculoskeletal: Moves all extremities  Neurologic: A&Ox3  Psychiatric: Appropriate affect and mood    Data   Data reviewed today:  I personally reviewed CXR, labwork, CT.  Recent Labs   Lab 07/11/21  1359   WBC 6.4  6.4   HGB 11.9*  11.9*   MCV 95  95     190      POTASSIUM 3.7   CHLORIDE 109   CO2 30   BUN 18   CR 0.92   ANIONGAP 2*   CLAUDIA 8.7   *   ALBUMIN 2.9*   PROTTOTAL 7.1   BILITOTAL 0.6   ALKPHOS 97   ALT 60   AST 38   TROPONIN <0.015       Imaging:  Recent Results (from the past 24 hour(s))   XR Chest 2 Views    Narrative    CHEST TWO VIEW   7/11/2021 2:19 PM     HISTORY: Weakness, confusion, no  fever/cough.    COMPARISON: Chest x-rays dated 12/6/2011.    FINDINGS:  Cardiac silhouette appears enlarged but this could be due  to the AP technique. It is unchanged since the prior study. Subtle  density left lateral costophrenic angle on the PA view could represent  atelectasis. Small infiltrates considered less likely. Lungs are  otherwise clear. There are thoracic aortic calcifications. Mediastinum  is grossly within normal limits. No pneumothorax or right pleural  fluid collection. No acute osseous fracture.      Impression    IMPRESSION:  1. Subtle density in the left lateral inferior base could represent  atelectasis, infiltrate, or effusion.  2. Top normal sized cardiac silhouette is likely due to the AP  technique.  3. Thoracic aortic calcifications.  4. No other evidence of acute cardiopulmonary disease is seen.    NAVEEN MOTA MD         SYSTEM ID:  [id number]   CT Head w/o Contrast    Narrative    CT SCAN OF THE HEAD WITHOUT CONTRAST   7/11/2021 2:42 PM     HISTORY: Recent left leg weakness, no known trauma.    TECHNIQUE:  Axial images of the head and coronal reformations without  IV contrast material. Radiation dose for this scan was reduced using  automated exposure control, adjustment of the mA and/or kV according  to patient size, or iterative reconstruction technique.    COMPARISON: 2/26/2015    FINDINGS:     Intracranial contents: No evidence for hyperdense hemorrhage. No mass,  mass effect or evidence for acute infarction. No extra-axial blood  products or fluid collections are identified. Satisfactory ventricular  caliber. Sella appears within normal limits. Moderate chronic ischemic  changes deep white matter both cerebral hemispheres. Moderate  generalized cerebral and cerebellar parenchymal volume loss. Vascular  calcification. No acute orbital process is identified. No extra-axial  hemorrhage. There is no evidence of intracranial hemorrhage, mass,  acute infarct or anomaly.    Visualized  orbits/sinuses/mastoids:  Very mild membrane thickening  paranasal sinuses. Postoperative changes left-sided mastoid air cells.  Opacification remaining left-sided mastoid air cells and left middle  ear with minimal fluid right-sided mastoid air cells inferiorly. Soft  tissue debris at the mastoid bowl.    Osseous structures/soft tissues:  Satisfactory mineralization. No  acute fracture of the calvarium or skull base. No significant swelling  of the facial or scalp tissues is evident.      Impression    IMPRESSION:  1. No acute process intracranially.  2. Chronic ischemic changes and parenchymal volume loss as above have  progressed from 2/26/2015.  3. Postoperative changes left-sided mastoid air cells as before.  4. See above for full description.      NAVEEN FRANCO MD         SYSTEM ID:  [id number]

## 2021-07-11 NOTE — ED TRIAGE NOTES
"Pt here today by spouse for weakness. Per spouse the pt is being treated for a UTI and now he's having increased weakness. Pt reports his urine is \"black\". Pt alert, follows commands. Hard of hearing. VSS. Needed w/c in triage.  "

## 2021-07-12 ENCOUNTER — APPOINTMENT (OUTPATIENT)
Dept: PHYSICAL THERAPY | Facility: CLINIC | Age: 81
End: 2021-07-12
Attending: INTERNAL MEDICINE
Payer: COMMERCIAL

## 2021-07-12 LAB
ANION GAP SERPL CALCULATED.3IONS-SCNC: 3 MMOL/L (ref 3–14)
BACTERIA UR CULT: ABNORMAL
BUN SERPL-MCNC: 17 MG/DL (ref 7–30)
CALCIUM SERPL-MCNC: 8.3 MG/DL (ref 8.5–10.1)
CHLORIDE BLD-SCNC: 110 MMOL/L (ref 94–109)
CO2 SERPL-SCNC: 29 MMOL/L (ref 20–32)
CREAT SERPL-MCNC: 0.95 MG/DL (ref 0.66–1.25)
ERYTHROCYTE [DISTWIDTH] IN BLOOD BY AUTOMATED COUNT: 13.8 % (ref 10–15)
GFR SERPL CREATININE-BSD FRML MDRD: 75 ML/MIN/1.73M2
GLUCOSE BLD-MCNC: 78 MG/DL (ref 70–99)
GLUCOSE BLDC GLUCOMTR-MCNC: 105 MG/DL (ref 70–99)
GLUCOSE BLDC GLUCOMTR-MCNC: 105 MG/DL (ref 70–99)
GLUCOSE BLDC GLUCOMTR-MCNC: 88 MG/DL (ref 70–99)
GLUCOSE BLDC GLUCOMTR-MCNC: 89 MG/DL (ref 70–99)
GLUCOSE BLDC GLUCOMTR-MCNC: 93 MG/DL (ref 70–99)
HCT VFR BLD AUTO: 33 % (ref 40–53)
HGB BLD-MCNC: 10.8 G/DL (ref 13.3–17.7)
MCH RBC QN AUTO: 30.8 PG (ref 26.5–33)
MCHC RBC AUTO-ENTMCNC: 32.7 G/DL (ref 31.5–36.5)
MCV RBC AUTO: 94 FL (ref 78–100)
PLATELET # BLD AUTO: 172 10E3/UL (ref 150–450)
POTASSIUM BLD-SCNC: 3.7 MMOL/L (ref 3.4–5.3)
RBC # BLD AUTO: 3.51 10E6/UL (ref 4.4–5.9)
SODIUM SERPL-SCNC: 142 MMOL/L (ref 133–144)
WBC # BLD AUTO: 6.3 10E3/UL (ref 4–11)

## 2021-07-12 PROCEDURE — 250N000013 HC RX MED GY IP 250 OP 250 PS 637: Performed by: INTERNAL MEDICINE

## 2021-07-12 PROCEDURE — 82962 GLUCOSE BLOOD TEST: CPT

## 2021-07-12 PROCEDURE — G0378 HOSPITAL OBSERVATION PER HR: HCPCS

## 2021-07-12 PROCEDURE — 80048 BASIC METABOLIC PNL TOTAL CA: CPT | Performed by: INTERNAL MEDICINE

## 2021-07-12 PROCEDURE — 36415 COLL VENOUS BLD VENIPUNCTURE: CPT | Performed by: INTERNAL MEDICINE

## 2021-07-12 PROCEDURE — 85027 COMPLETE CBC AUTOMATED: CPT | Performed by: INTERNAL MEDICINE

## 2021-07-12 PROCEDURE — 99226 PR SUBSEQUENT OBSERVATION CARE,LEVEL III: CPT | Performed by: PHYSICIAN ASSISTANT

## 2021-07-12 PROCEDURE — 97530 THERAPEUTIC ACTIVITIES: CPT | Mod: GP

## 2021-07-12 PROCEDURE — 97161 PT EVAL LOW COMPLEX 20 MIN: CPT | Mod: GP

## 2021-07-12 RX ADMIN — ATORVASTATIN CALCIUM 20 MG: 20 TABLET, FILM COATED ORAL at 10:43

## 2021-07-12 RX ADMIN — ROPINIROLE HYDROCHLORIDE 0.25 MG: 0.25 TABLET, FILM COATED ORAL at 10:44

## 2021-07-12 RX ADMIN — ROPINIROLE HYDROCHLORIDE 0.25 MG: 0.25 TABLET, FILM COATED ORAL at 13:39

## 2021-07-12 RX ADMIN — METFORMIN ER 500 MG 1000 MG: 500 TABLET ORAL at 10:43

## 2021-07-12 RX ADMIN — DONEPEZIL HYDROCHLORIDE 10 MG: 10 TABLET ORAL at 10:44

## 2021-07-12 RX ADMIN — ROPINIROLE HYDROCHLORIDE 0.25 MG: 0.25 TABLET, FILM COATED ORAL at 20:06

## 2021-07-12 RX ADMIN — ACETAMINOPHEN 975 MG: 325 TABLET, FILM COATED ORAL at 17:15

## 2021-07-12 RX ADMIN — FINASTERIDE 5 MG: 5 TABLET, FILM COATED ORAL at 10:43

## 2021-07-12 RX ADMIN — GLIPIZIDE 5 MG: 5 TABLET, FILM COATED, EXTENDED RELEASE ORAL at 10:43

## 2021-07-12 RX ADMIN — LEVOFLOXACIN 500 MG: 500 TABLET, FILM COATED ORAL at 12:17

## 2021-07-12 RX ADMIN — CITALOPRAM HYDROBROMIDE 20 MG: 20 TABLET ORAL at 10:43

## 2021-07-12 RX ADMIN — IRBESARTAN 300 MG: 150 TABLET ORAL at 10:43

## 2021-07-12 RX ADMIN — ROPINIROLE HYDROCHLORIDE 0.5 MG: 0.5 TABLET, FILM COATED ORAL at 22:31

## 2021-07-12 NOTE — PROGRESS NOTES
[facility]      OUTPATIENT PHYSICAL THERAPY EVALUATION  PLAN OF TREATMENT FOR OUTPATIENT REHABILITATION  (COMPLETE FOR INITIAL CLAIMS ONLY)  Patient's Last Name, First Name, M.I.  YOB: 1940  Chavez Mccain                        Provider's Name  [facility] Medical Record No.  [id number]                               Onset Date:  07/11/21   Start of Care Date:  07/12/21      Type:     _X_PT   ___OT   ___SLP Medical Diagnosis:  UTI, encephalopathy                        PT Diagnosis:  Difficulty ambulating   Visits from SOC:  1   _________________________________________________________________________________  Plan of Treatment/Functional Goals    Planned Interventions: bed mobility training, gait training, neuromuscular re-education, strengthening, transfer training     Goals: See Physical Therapy Goals on Care Plan in [employer] electronic health record.    Therapy Frequency: 3x/week  Predicted Duration of Therapy Intervention: 1 week  _________________________________________________________________________________    I CERTIFY THE NEED FOR THESE SERVICES FURNISHED UNDER        THIS PLAN OF TREATMENT AND WHILE UNDER MY CARE     (Physician co-signature of this document indicates review and certification of the therapy plan).                Certification date from: 07/12/21, Certification date to: 07/19/21    Referring Physician: Lul Mendoza MD            Initial Assessment        See Physical Therapy evaluation dated 07/12/21 in Epic electronic health record.

## 2021-07-12 NOTE — PLAN OF CARE
Observation goals PRIOR TO DISCHARGE       Comments:     -Diagnostic tests and consults completed and resulted-nOT MET     -Vital signs normal or at patient baseline -Not met, elevated systolic    Nurse to notify provider when observation goals have been met and patient is ready for discharge.      Summary:     Pt is A&OX2, disoriented to time and situation, elevated systolic otherwise VSS on RA.W/C bound baseline, total care, turn and repo.Denied any pain,paige mod CHO diet, carb counting,Sequeira intact, draining straw colored urine, PIV is s/l.PT/OT consults pending.

## 2021-07-12 NOTE — PROGRESS NOTES
Observation Goals      -Diagnostic tests and consults completed and resulted-  Not Met (placement pending)     -Vital signs normal or at patient baseline   Met (Vitally stable, still weak but will be going to TCU)    Nurse to notify provider when observation goals have been met and patient is ready for discharge.

## 2021-07-12 NOTE — PLAN OF CARE
Observation (see obs goals). AOX2. Baseline dementia. [place]. VSS on room air. Denies pain. Up in room with assist of 2 gaitbelt and walker. Up in chair today. Tolerating mod carb diet. BG 88 this AM. Did not eat lunch. No insulin given. PIV SL. Incontinent of stool, brief in place. Worked with PT today. Recommending TCU, lives with wife at home and she is not able to help take care of the patient. Discharge pending.

## 2021-07-12 NOTE — PLAN OF CARE
Occupational Therapy: Orders received. Chart reviewed and discussed with care team.? Occupational Therapy not indicated due to Pt is observation status with recommendations for TCU from PT; pt in agreement with TCU.?Therefor OT intervention will be deferred to next level of care. Defer discharge recommendations to PT.? Will complete orders.

## 2021-07-12 NOTE — PROGRESS NOTES
07/12/21 1115   Quick Adds   Quick Adds Certification   Type of Visit Initial PT Evaluation   Living Environment   People in home spouse   Current Living Arrangements house   Home Accessibility no concerns;wheelchair accessible   Self-Care   Usual Activity Tolerance fair   Current Activity Tolerance poor   Regular Exercise Yes   Activity/Exercise Type other (see comments)  (Private PT comes 3x/wk to do exercises)   Exercise Amount/Frequency 3-5 times/wk   Equipment Currently Used at Home walker, rolling;wheelchair, manual;wheelchair, power   Activity/Exercise/Self-Care Comment Pt can walk household distances only, does not leave the home except for doctors appointments.   Disability/Function   Fall history within last six months no   General Information   Onset of Illness/Injury or Date of Surgery 07/11/21   Referring Physician Lul Mendoza MD   Patient/Family Therapy Goals Statement (PT) Go to rehab per pt and spouse   Pertinent History of Current Problem (include personal factors and/or comorbidities that impact the POC) Pt admitted under observation status with a UTI and encephalopathy. PMH: frontotemporal dementia, chronic sexton, CKD, DM2, recurrent UTIs, HTN.   Existing Precautions/Restrictions fall   Weight-Bearing Status - LLE full weight-bearing   Weight-Bearing Status - RLE full weight-bearing   Cognition   Orientation Status (Cognition) person   Affect/Mental Status (Cognition) confused   Follows Commands (Cognition) WFL   Pain Assessment   Patient Currently in Pain No   Posture    Posture Forward head position;Protracted shoulders   Range of Motion (ROM)   ROM Quick Adds ROM WFL   Strength   Strength Comments B LEs grossly weak but >3/5   Bed Mobility   Comment (Bed Mobility) Supine to sit with mod A    Transfers   Transfer Safety Comments Sit to stand with mod A of 2 and FWW   Gait/Stairs (Locomotion)   Comment (Gait/Stairs) Pt amb 2 ft bed to chair with FWW and Min A of 2   Balance   Balance Comments  Balance unsteady in standing and gait   Sensory Examination   Sensory Perception patient reports no sensory changes   Clinical Impression   Criteria for Skilled Therapeutic Intervention yes, treatment indicated   PT Diagnosis (PT) Difficulty ambulating   Influenced by the following impairments Dec strength, balance, activity tolerance   Functional limitations due to impairments Difficulty ambulating and transferring   Clinical Presentation Stable/Uncomplicated   Clinical Presentation Rationale medically stable   Clinical Decision Making (Complexity) low complexity   Therapy Frequency (PT) 3x/week   Predicted Duration of Therapy Intervention (days/wks) 1 week   Planned Therapy Interventions (PT) bed mobility training;gait training;neuromuscular re-education;strengthening;transfer training   Risk & Benefits of therapy have been explained evaluation/treatment results reviewed;care plan/treatment goals reviewed;risks/benefits reviewed;current/potential barriers reviewed;participants voiced agreement with care plan   PT Discharge Planning    PT Discharge Recommendation (DC Rec) Transitional Care Facility   PT Rationale for DC Rec Pt is below his baseline mobility status, is weak presently and needs assist of 2 for mobility. Pt's spouse is unable to care for him at home in this state. Recommend discharge to TCU for improvement of strength, balance, mobility, activity tolearnce before returning home.   PT Brief overview of current status  Mod A supine to sit, mod A of 2 sit to stand with FWW and min A of 2 gait bed to chair with FWW, unsteady.   Therapy Certification   Start of care date 07/12/21   Certification date from 07/12/21   Certification date to 07/19/21   Medical Diagnosis UTI, encephalopathy   Total Evaluation Time   Total Evaluation Time (Minutes) 15

## 2021-07-12 NOTE — PLAN OF CARE
Observation goals PRIOR TO DISCHARGE     Comments: -diagnostic tests and consults completed and resulted- not met   -vital signs normal or at patient baseline - not met, HTN  Nurse to notify provider when observation goals have been met and patient is ready for discharge.          Pt A&Ox2, confusion, baseline dementia. VSS on RA except HTN, prn available if > 180. PIV SL. Mod carb diet. BG check. Total care, turn & repo. Sequeira cath in place. PT/OT consult pending. Continue to monitor.

## 2021-07-12 NOTE — PROGRESS NOTES
[facility]    Medicine Progress Note - Hospitalist Service       Date of Admission:  7/11/2021    Assessment & Plan         Chavez Mccain is a 80 year old male with PMH of frontotemporal dementia, chronic indwelling sexton, CKD, DM2, HLD, HTN, who presented 7/11/21 with generalized weakness, poor appetites and hallucinations, found to have a UTI. Registered under observation status for further evaluation and treatment.      Acute encephalopathy, suspect infectious, improving   Catheter associated urinary tract infection  History of recurrent UTIs  Patient with history of UTIs associated with chronic indwelling sexton catheter. Prior urine cultures from 2019, 2017, 2015 have shown fluoroquinolone sensitivity. Developed typical foul and dark urine two to three days ago and started on cipro as outpatient. Symptoms then progressed and patient developed increasing weakness, confusion, possible hallucinations. Wife (primary caregiver) unable to take care of patient. UA continues to have pyuria. No leukocytosis. Ciprofloxacin is likely appropriate here however it is sometimes associated with neurological symptoms. On the other hand, wife reports that patient has tolerated UTIs for ciprofloxacin before, and most likely his confusion is related to his UTI not antibiotics.  * Chronic sexton catheter exchanged in the ED 7/11   - Continue levofloxacin  - Scheduled tylenol, monitor for delirium  - Follow urine culture for final antibiotic recs  - Family did report concern of LLE weakness and word finding difficulties; pt appears to be back to baseline on my assessment today. No focal neuro deficits. CT head negative for acute pathology. Low threshold to obtain MRI brain if any change in neurologic status.   - PT consulted, recommend TCU. Family agreeable   - SW to send referrals    Abnormal CXR, LLL infiltrate: Pt without URI sx. No SOB, cough or fever reported. No reported aspiration events.  Question infectious etiology vs atelectasis.   - Monitor respiratory status   - Antibiotics as above   - Encourage pulmonary toilet with IS       Frontotemporal dementia  - Continue PTA Requip, donepezil, Celexa     CKD: Creatinine at baseline 0.9  - Monitor      DM2: A1C 5.8 on admission.   - Continue PTA metformin and glipizide  - MDSSI  - Monitor for hypoglycemia while on fluoroquinolones    Recent Labs   Lab 07/12/21  0822 07/12/21  0641 07/12/21  0213 07/11/21  2058 07/11/21  1731 07/11/21  1359   GLC 88 78 93 111* 166* 174*      HLD, HTN: BP elevated throughout stay, though improved on recheck 7/12 AM.   - Hydralazine PO PRN for SBP > 180  - Continue PTA irbesartan     Diet: Consistent Carb 60 grams CHO per Meal Diet    DVT Prophylaxis: Ambulate every shift  Sequeira Catheter: PRESENT, indication: Retention  Central Lines: None  Code Status: No CPR- Do NOT Intubate      Disposition Plan   Expected discharge: 7/13/21 to TCU     The patient's care was discussed with the Attending Physician, Dr. Ramirez, Bedside Nurse, Patient and Patient's Family, wife, Maria Dolores Bernabe Jenn Ambrose PA-C  Hospitalist Service  [facility]  Securely message with the Vocera Web Console (learn more here)  Text page via [facility] Paging/Directory  ______________________________________________________________________    Interval History   Sitting upright in chair eating breakfast. No acute concerns today. Denies chest pain, SOB, dizziness, lightheadedness. No nausea, vomiting diarrhea. Oriented to person, place and time; forgetful. [place].     Data reviewed today: I reviewed all medications, new labs and imaging results over the last 24 hours. I personally reviewed all labs and imaging to date.     Physical Exam   Vital Signs: Temp: 97.2  F (36.2  C) Temp src: Oral BP: 135/44 Pulse: 73   Resp: 18 SpO2: 93 % O2 Device: None (Room air)    Weight: 245 lbs 14.4 oz    CONSTITUTIONAL: Pt laying in bed,  dressed in hospital garb. Appears comfortale. Cooperative with interview. Accompanied by wife, Maria Dolores, at bedside.   HEENT: Normocephalic, atraumatic. Pupils equal, round, and reactive to light. EOMI, bilat. Negative for conjunctival redness or scleral icterus.  Oral mucosa pink and moist; negative for ulcerations, erythema, or exudates.  Dentition poor.   CARDIOVASCULAR: RRR, no murmurs, rubs, or extra heart sounds appreciated. Pulses +2/4 and regular in upper and lower extremities, bilaterally.   RESPIRATORY: No increased work of breathing. CTA, bilat; no wheezes, rales, or rhonchi appreciated.  GASTROINTESTINAL:  Abdomen soft, non-distended. BS auscultated in all four quadrants. Negative for tenderness to palpation.  No masses or organomegaly noted.  MUSCULOSKELETAL: Strength +5/5 in upper and lower extremities, bilaterally. No gross deformities noted. Normal muscle tone.   HEMATOLOGIC/LYMPHATIC/IMMUNOLOGIC: Negative for lower extremity edema, bilaterally.  NEUROLOGIC: Alert and oriented to person, place, and time, forgetful.  strength intact. CN's II-XII grossly intact. Cerebellar function intact per finger to nose testing and rapid hand movement intact, bilat in upper extremities. Sensation equal and intact in upper and lower extremities, bilat.   SKIN: Warm, dry, intact. No jaundice noted. Negative for suspicious lesions, rashes, bruising, open sores or abrasions.     Data   Recent Labs   Lab 07/12/21  0822 07/12/21  0641 07/12/21  0213 07/11/21  1359   WBC  --  6.3  --  6.4  6.4   HGB  --  10.8*  --  11.9*  11.9*   MCV  --  94  --  95  95   PLT  --  172  --  190  190   NA  --  142  --  141   POTASSIUM  --  3.7  --  3.7   CHLORIDE  --  110*  --  109   CO2  --  29  --  30   BUN  --  17  --  18   CR  --  0.95  --  0.92   ANIONGAP  --  3  --  2*   CLAUDIA  --  8.3*  --  8.7   GLC 88 78 93 174*   ALBUMIN  --   --   --  2.9*   PROTTOTAL  --   --   --  7.1   BILITOTAL  --   --   --  0.6   ALKPHOS  --   --   --   97   ALT  --   --   --  60   AST  --   --   --  38   TROPONIN  --   --   --  <0.015     Recent Results (from the past 24 hour(s))   XR Chest 2 Views    Narrative    CHEST TWO VIEW   7/11/2021 2:19 PM     HISTORY: Weakness, confusion, no fever/cough.    COMPARISON: Chest x-rays dated 12/6/2011.    FINDINGS:  Cardiac silhouette appears enlarged but this could be due  to the AP technique. It is unchanged since the prior study. Subtle  density left lateral costophrenic angle on the PA view could represent  atelectasis. Small infiltrates considered less likely. Lungs are  otherwise clear. There are thoracic aortic calcifications. Mediastinum  is grossly within normal limits. No pneumothorax or right pleural  fluid collection. No acute osseous fracture.      Impression    IMPRESSION:  1. Subtle density in the left lateral inferior base could represent  atelectasis, infiltrate, or effusion.  2. Top normal sized cardiac silhouette is likely due to the AP  technique.  3. Thoracic aortic calcifications.  4. No other evidence of acute cardiopulmonary disease is seen.    NAVEEN MOTA MD         SYSTEM ID:  [id number]   CT Head w/o Contrast    Narrative    CT SCAN OF THE HEAD WITHOUT CONTRAST   7/11/2021 2:42 PM     HISTORY: Recent left leg weakness, no known trauma.    TECHNIQUE:  Axial images of the head and coronal reformations without  IV contrast material. Radiation dose for this scan was reduced using  automated exposure control, adjustment of the mA and/or kV according  to patient size, or iterative reconstruction technique.    COMPARISON: 2/26/2015    FINDINGS:     Intracranial contents: No evidence for hyperdense hemorrhage. No mass,  mass effect or evidence for acute infarction. No extra-axial blood  products or fluid collections are identified. Satisfactory ventricular  caliber. Sella appears within normal limits. Moderate chronic ischemic  changes deep white matter both cerebral hemispheres. Moderate  generalized  cerebral and cerebellar parenchymal volume loss. Vascular  calcification. No acute orbital process is identified. No extra-axial  hemorrhage. There is no evidence of intracranial hemorrhage, mass,  acute infarct or anomaly.    Visualized orbits/sinuses/mastoids:  Very mild membrane thickening  paranasal sinuses. Postoperative changes left-sided mastoid air cells.  Opacification remaining left-sided mastoid air cells and left middle  ear with minimal fluid right-sided mastoid air cells inferiorly. Soft  tissue debris at the mastoid bowl.    Osseous structures/soft tissues:  Satisfactory mineralization. No  acute fracture of the calvarium or skull base. No significant swelling  of the facial or scalp tissues is evident.      Impression    IMPRESSION:  1. No acute process intracranially.  2. Chronic ischemic changes and parenchymal volume loss as above have  progressed from 2/26/2015.  3. Postoperative changes left-sided mastoid air cells as before.  4. See above for full description.      NAVEEN FRANCO MD         SYSTEM ID:  [id number]     Medications     sodium chloride Stopped (07/12/21 0000)       acetaminophen  975 mg Oral Q8H     atorvastatin  20 mg Oral QAM     citalopram  20 mg Oral Daily     donepezil  10 mg Oral Daily     finasteride  5 mg Oral Daily     glipiZIDE  5 mg Oral Daily     insulin aspart  1-7 Units Subcutaneous TID AC     insulin aspart  1-5 Units Subcutaneous At Bedtime     irbesartan  300 mg Oral Daily     levofloxacin  500 mg Oral Q24H     metFORMIN  1,000 mg Oral Daily     rOPINIRole  0.25 mg Oral TID     rOPINIRole  0.5 mg Oral At Bedtime

## 2021-07-12 NOTE — PLAN OF CARE
Observation goals PRIOR TO DISCHARGE       Comments:     -Diagnostic tests and consults completed and resulted-nOT MET     -Vital signs normal or at patient baseline -Not met, elevated systolic    Nurse to notify provider when observation goals have been met and patient is ready for discharge.

## 2021-07-12 NOTE — PROGRESS NOTES
Observation Goals     -Diagnostic tests and consults completed and resulted-  Not Met      -Vital signs normal or at patient baseline   Not Met    Nurse to notify provider when observation goals have been met and patient is ready for discharge.

## 2021-07-13 LAB
ANION GAP SERPL CALCULATED.3IONS-SCNC: 5 MMOL/L (ref 3–14)
BASOPHILS # BLD AUTO: 0.1 10E3/UL (ref 0–0.2)
BASOPHILS NFR BLD AUTO: 1 %
BUN SERPL-MCNC: 21 MG/DL (ref 7–30)
CALCIUM SERPL-MCNC: 8.5 MG/DL (ref 8.5–10.1)
CHLORIDE BLD-SCNC: 107 MMOL/L (ref 94–109)
CO2 SERPL-SCNC: 26 MMOL/L (ref 20–32)
CREAT SERPL-MCNC: 1.03 MG/DL (ref 0.66–1.25)
EOSINOPHIL # BLD AUTO: 0.3 10E3/UL (ref 0–0.7)
EOSINOPHIL NFR BLD AUTO: 4 %
ERYTHROCYTE [DISTWIDTH] IN BLOOD BY AUTOMATED COUNT: 14 % (ref 10–15)
GFR SERPL CREATININE-BSD FRML MDRD: 68 ML/MIN/1.73M2
GLUCOSE BLD-MCNC: 91 MG/DL (ref 70–99)
GLUCOSE BLDC GLUCOMTR-MCNC: 117 MG/DL (ref 70–99)
GLUCOSE BLDC GLUCOMTR-MCNC: 128 MG/DL (ref 70–99)
GLUCOSE BLDC GLUCOMTR-MCNC: 137 MG/DL (ref 70–99)
GLUCOSE BLDC GLUCOMTR-MCNC: 174 MG/DL (ref 70–99)
GLUCOSE BLDC GLUCOMTR-MCNC: 83 MG/DL (ref 70–99)
GLUCOSE BLDC GLUCOMTR-MCNC: 91 MG/DL (ref 70–99)
HCT VFR BLD AUTO: 34.8 % (ref 40–53)
HGB BLD-MCNC: 11.2 G/DL (ref 13.3–17.7)
IMM GRANULOCYTES # BLD: 0 10E3/UL
IMM GRANULOCYTES NFR BLD: 0 %
LYMPHOCYTES # BLD AUTO: 1.2 10E3/UL (ref 0.8–5.3)
LYMPHOCYTES NFR BLD AUTO: 18 %
MCH RBC QN AUTO: 30.6 PG (ref 26.5–33)
MCHC RBC AUTO-ENTMCNC: 32.2 G/DL (ref 31.5–36.5)
MCV RBC AUTO: 95 FL (ref 78–100)
MONOCYTES # BLD AUTO: 0.8 10E3/UL (ref 0–1.3)
MONOCYTES NFR BLD AUTO: 11 %
NEUTROPHILS # BLD AUTO: 4.4 10E3/UL (ref 1.6–8.3)
NEUTROPHILS NFR BLD AUTO: 66 %
NRBC # BLD AUTO: 0 10E3/UL
NRBC BLD AUTO-RTO: 0 /100
PLATELET # BLD AUTO: 185 10E3/UL (ref 150–450)
POTASSIUM BLD-SCNC: 3.7 MMOL/L (ref 3.4–5.3)
RBC # BLD AUTO: 3.66 10E6/UL (ref 4.4–5.9)
SODIUM SERPL-SCNC: 138 MMOL/L (ref 133–144)
WBC # BLD AUTO: 6.8 10E3/UL (ref 4–11)

## 2021-07-13 PROCEDURE — 99217 PR OBSERVATION CARE DISCHARGE: CPT | Performed by: HOSPITALIST

## 2021-07-13 PROCEDURE — 85025 COMPLETE CBC W/AUTO DIFF WBC: CPT | Performed by: PHYSICIAN ASSISTANT

## 2021-07-13 PROCEDURE — 36415 COLL VENOUS BLD VENIPUNCTURE: CPT | Performed by: PHYSICIAN ASSISTANT

## 2021-07-13 PROCEDURE — 80048 BASIC METABOLIC PNL TOTAL CA: CPT | Performed by: PHYSICIAN ASSISTANT

## 2021-07-13 PROCEDURE — G0378 HOSPITAL OBSERVATION PER HR: HCPCS

## 2021-07-13 PROCEDURE — 96372 THER/PROPH/DIAG INJ SC/IM: CPT

## 2021-07-13 PROCEDURE — 250N000013 HC RX MED GY IP 250 OP 250 PS 637: Performed by: INTERNAL MEDICINE

## 2021-07-13 PROCEDURE — 99207 PR APP CREDIT; MD BILLING SHARED VISIT: CPT | Performed by: PHYSICIAN ASSISTANT

## 2021-07-13 PROCEDURE — 99207 PR CDG-HISTORY COMP: MEETS EXP. PROB FOCUSED- DOWN CODED LACK OF PFSH: CPT | Performed by: HOSPITALIST

## 2021-07-13 PROCEDURE — 82962 GLUCOSE BLOOD TEST: CPT

## 2021-07-13 RX ORDER — LEVOFLOXACIN 500 MG/1
500 TABLET, FILM COATED ORAL EVERY 24 HOURS
DISCHARGE
Start: 2021-07-14 | End: 2021-07-16

## 2021-07-13 RX ADMIN — ROPINIROLE HYDROCHLORIDE 0.25 MG: 0.25 TABLET, FILM COATED ORAL at 20:09

## 2021-07-13 RX ADMIN — ROPINIROLE HYDROCHLORIDE 0.25 MG: 0.25 TABLET, FILM COATED ORAL at 07:57

## 2021-07-13 RX ADMIN — IRBESARTAN 300 MG: 150 TABLET ORAL at 07:58

## 2021-07-13 RX ADMIN — DONEPEZIL HYDROCHLORIDE 10 MG: 10 TABLET ORAL at 07:57

## 2021-07-13 RX ADMIN — CITALOPRAM HYDROBROMIDE 20 MG: 20 TABLET ORAL at 07:57

## 2021-07-13 RX ADMIN — ROPINIROLE HYDROCHLORIDE 0.5 MG: 0.5 TABLET, FILM COATED ORAL at 22:14

## 2021-07-13 RX ADMIN — METFORMIN ER 500 MG 1000 MG: 500 TABLET ORAL at 07:57

## 2021-07-13 RX ADMIN — FINASTERIDE 5 MG: 5 TABLET, FILM COATED ORAL at 07:57

## 2021-07-13 RX ADMIN — LEVOFLOXACIN 500 MG: 500 TABLET, FILM COATED ORAL at 11:32

## 2021-07-13 RX ADMIN — ATORVASTATIN CALCIUM 20 MG: 20 TABLET, FILM COATED ORAL at 07:57

## 2021-07-13 RX ADMIN — GLIPIZIDE 5 MG: 5 TABLET, FILM COATED, EXTENDED RELEASE ORAL at 07:57

## 2021-07-13 RX ADMIN — INSULIN ASPART 1 UNITS: 100 INJECTION, SOLUTION INTRAVENOUS; SUBCUTANEOUS at 12:43

## 2021-07-13 RX ADMIN — ROPINIROLE HYDROCHLORIDE 0.25 MG: 0.25 TABLET, FILM COATED ORAL at 13:58

## 2021-07-13 RX ADMIN — ACETAMINOPHEN 975 MG: 325 TABLET, FILM COATED ORAL at 17:55

## 2021-07-13 RX ADMIN — ACETAMINOPHEN 975 MG: 325 TABLET, FILM COATED ORAL at 10:55

## 2021-07-13 RX ADMIN — ACETAMINOPHEN 975 MG: 325 TABLET, FILM COATED ORAL at 03:17

## 2021-07-13 NOTE — PROGRESS NOTES
[facility]    Medicine Progress Note - Hospitalist Service       Date of Admission:  7/11/2021    Assessment & Plan         Chavez Mccain is a 80 year old male with PMH of frontotemporal dementia, chronic indwelling sexton, CKD, DM2, HLD, HTN, who presented 7/11/21 with generalized weakness, poor appetites and hallucinations, found to have a UTI. Registered under observation status for further evaluation and treatment.      Acute encephalopathy, suspect infectious, improving   Catheter associated urinary tract infection  History of recurrent UTIs  Patient with history of UTIs associated with chronic indwelling sexton catheter. Prior urine cultures from 2019, 2017, 2015 have shown fluoroquinolone sensitivity. Developed typical foul and dark urine two to three days ago and started on cipro as outpatient. Symptoms then progressed and patient developed increasing weakness, confusion, possible hallucinations. Wife (primary caregiver) unable to take care of patient. UA continues to have pyuria. No leukocytosis. Ciprofloxacin is likely appropriate here however it is sometimes associated with neurological symptoms. On the other hand, wife reports that patient has tolerated UTIs for ciprofloxacin before, and most likely his confusion is related to his UTI not antibiotics.  * Chronic sexton catheter exchanged in the ED 7/11, had been prescribed cipro starting 7/9/21  * Urine culture with 10,000-50,000 candida albicans; likely represents colonization given chronic Sexton. Pt had been on antibiotics for at least 2 days prior to ED presentation and urine collection, thus results skewed. Clinically improving on antibiotics  - Continue levofloxacin, transitioned to oral on 7/13 (day 2), plan for 5 day course   - Scheduled tylenol  - Wife did report concern of LLE weakness and word finding difficulties on admission; pt appears to be back to baseline on my assessment 7/12 and 7/13. No focal neuro  deficits. CT head negative for acute pathology. Low threshold to obtain MRI brain if any change in neurologic status.   - PT consulted, recommend TCU. Family agreeable   - SW to send referrals    Abnormal CXR, LLL infiltrate: Pt without URI sx. No SOB, cough or fever reported. No reported aspiration events. Question infectious etiology vs atelectasis.   - Monitor respiratory status   - Antibiotics as above   - Encourage pulmonary toilet with IS       Frontotemporal dementia  - Continue PTA Requip, donepezil, Celexa     CKD: Creatinine at baseline 0.9  - Monitor      DM2: A1C 5.8 on admission.   - Continue PTA metformin and glipizide  - MDSSI  - Monitor for hypoglycemia while on fluoroquinolones    Recent Labs   Lab 07/13/21  0817 07/13/21  0625 07/13/21  0323 07/13/21  0230 07/12/21  2109 07/12/21  1848   GLC 91 91 117* 83 105* 105*      HLD, HTN: BP elevated throughout stay, though improved on recheck 7/12 AM.   - Hydralazine PO PRN for SBP > 180  - Continue PTA irbesartan     Diet: Consistent Carb 60 grams CHO per Meal Diet    DVT Prophylaxis: Ambulate every shift  Sequeira Catheter: PRESENT, indication: Retention  Central Lines: None  Code Status: No CPR- Do NOT Intubate      Disposition Plan   Expected discharge: 7/13/21 to TCU     The patient's care was discussed with Dr. Sebastian, patient, bedside RN, and SW.     Shefali Ambrose PA-C  Hospitalist Service  [facility]  Securely message with the Vocera Web Console (learn more here)  Text page via [facility] Paging/Directory  ______________________________________________________________________    Interval History   Sitting upright in chair eating breakfast. No acute concerns today. Denies chest pain, SOB, dizziness, lightheadedness. No nausea, vomiting diarrhea. Oriented to person, place and time; forgetful. [place].     Data reviewed today: I reviewed all medications, new labs and imaging results over the last 24 hours. I  personally reviewed all labs and imaging to date.     Physical Exam   Vital Signs: Temp: 97.3  F (36.3  C) Temp src: Oral BP: (!) 144/57 Pulse: 60   Resp: 16 SpO2: 94 % O2 Device: None (Room air)    Weight: 245 lbs 14.4 oz    CONSTITUTIONAL: Pt laying in bed, dressed in hospital garb. Appears comfortale. Cooperative with interview.   HEENT: Normocephalic, atraumatic. Pupils equal, round, and reactive to light. EOMI, bilat. Negative for conjunctival redness or scleral icterus.  Oral mucosa pink and moist; negative for ulcerations, erythema, or exudates.  Dentition poor.   CARDIOVASCULAR: RRR, no murmurs, rubs, or extra heart sounds appreciated. Pulses +2/4 and regular in upper and lower extremities, bilaterally.   RESPIRATORY: No increased work of breathing. CTA, bilat; no wheezes, rales, or rhonchi appreciated.  GASTROINTESTINAL:  Abdomen soft, non-distended. BS auscultated in all four quadrants. Negative for tenderness to palpation.  No masses or organomegaly noted.  MUSCULOSKELETAL: Strength +5/5 in upper and lower extremities, bilaterally. No gross deformities noted. Normal muscle tone.   HEMATOLOGIC/LYMPHATIC/IMMUNOLOGIC: Negative for lower extremity edema, bilaterally.  NEUROLOGIC: Alert and oriented to person, place, and time, forgetful.  strength intact. CN's II-XII grossly intact. Cerebellar function intact per finger to nose testing and rapid hand movement intact, bilat in upper extremities. Sensation equal and intact in upper and lower extremities, bilat.   SKIN: Warm, dry, intact. No jaundice noted. Negative for suspicious lesions, rashes, bruising, open sores or abrasions.     Data   Recent Labs   Lab 07/13/21  0817 07/13/21  0625 07/13/21  0323 07/12/21  0641 07/11/21  1359   WBC  --  6.8  --  6.3 6.4  6.4   HGB  --  11.2*  --  10.8* 11.9*  11.9*   MCV  --  95  --  94 95  95   PLT  --  185  --  172 190  190   NA  --  138  --  142 141   POTASSIUM  --  3.7  --  3.7 3.7   CHLORIDE  --  107  --   110* 109   CO2  --  26  --  29 30   BUN  --  21  --  17 18   CR  --  1.03  --  0.95 0.92   ANIONGAP  --  5  --  3 2*   CLAUDIA  --  8.5  --  8.3* 8.7   GLC 91 91 117* 78 174*   ALBUMIN  --   --   --   --  2.9*   PROTTOTAL  --   --   --   --  7.1   BILITOTAL  --   --   --   --  0.6   ALKPHOS  --   --   --   --  97   ALT  --   --   --   --  60   AST  --   --   --   --  38   TROPONIN  --   --   --   --  <0.015     No results found for this or any previous visit (from the past 24 hour(s)).  Medications     sodium chloride Stopped (07/12/21 0000)       acetaminophen  975 mg Oral Q8H     atorvastatin  20 mg Oral QAM     citalopram  20 mg Oral Daily     donepezil  10 mg Oral Daily     finasteride  5 mg Oral Daily     glipiZIDE  5 mg Oral Daily     insulin aspart  1-7 Units Subcutaneous TID AC     insulin aspart  1-5 Units Subcutaneous At Bedtime     irbesartan  300 mg Oral Daily     levofloxacin  500 mg Oral Q24H     metFORMIN  1,000 mg Oral Daily     rOPINIRole  0.25 mg Oral TID     rOPINIRole  0.5 mg Oral At Bedtime

## 2021-07-13 NOTE — PROGRESS NOTES
Observation goals PRIOR TO DISCHARGE     Comments:   -diagnostic tests and consults completed and resulted - not met    -vital signs normal or at patient baseline - not met    Nurse to notify provider when observation goals have been met and patient is ready for discharge.

## 2021-07-13 NOTE — UTILIZATION REVIEW
Concurrent stay review; Secondary Review Determination    Under the authority of the Utilization Management Committee, the utilization review process indicated a secondary review on the above patient. The review outcome is based on review of the medical records, discussions with staff, and applying clinical experience noted on the date of the review.    (x) Observation Status Appropriate - Concurrent stay review        RATIONALE FOR DETERMINATION:  80-year-old male with frontotemporal dementia, chronic indwelling Sequeira catheter with recurrent urinary tract infections.  Patient started on Cipro 2 days prior to admission due to presumed urinary tract infection, while using his walker patient was unable to pivot today not using his left leg normally.  Due to patient's weakness concern for possible stroke as well as recent increase in hallucinations patient brought to the hospital.  Patient with normal white blood count and differential, has relative mild pyuria for chronic indwelling Sequeira catheter specimen, afebrile with normal vital signs.  Observation care appropriate for initial treatment monitoring patient's mental status which has improved to baseline during the first 24 hours.  Awaiting disposition.    Patient is clinically improving and there is no clear indication to change patient's status to inpatient. The severity of illness, intensity of service provided, expected LOS and risk for adverse outcome make the care appropriate for observation.    This document was produced using voice recognition software    The information on this document is developed by the utilization review team in order for the business office to ensure compliance. This only denotes the appropriateness of proper admission status and does not reflect the quality of care rendered.    The definitions of Inpatient Status and Observation Status used in making the determination above are those provided in the CMS Coverage Manual, Chapter 1 and  Chapter 6, section 70.4.    Sincerely,    He Cardoza MD  Utilization Review  Physician Advisor  [facility].

## 2021-07-13 NOTE — PROGRESS NOTES
Care Management Discharge Note    Discharge Date: 07/13/2021       [facility]  Discharge Services:      Discharge DME:      Discharge Transportation: family will provide    Private pay costs discussed: insurance costs co-pays    PAS Confirmation Code:  [id number]  Patient/family educated on Medicare website which has current facility and service quality ratings: yes    Education Provided on the Discharge Plan:    Persons Notified of Discharge Plans: pt, wife, medical team, TCU  Patient/Family in Agreement with the Plan: yes    Handoff Referral Completed: No    DC orders: sent via DOD  Scripts:  PAS: COMPLETED, FAXED AND PLACED ON CHART  Trans: Family at 1530- PENDING INSURANCE AUTH    ADDENDUM: [employer] ins auth pending. If not received by 1630 today, patient will need to wait until morning for discharge. CLARIBEL updated pt/family and medical team.    SW to continue to follow and assist with discharge planning.    ALEX Javed  Daytime (8:00am-4:30pm): [phone]  After-Hours SW Pager (4:30pm-11:30pm): [phone]                 ALEX Guevara

## 2021-07-13 NOTE — PROGRESS NOTES
Observation Goals      -Diagnostic tests and consults completed and resulted-  Not Met (placement pending)     -Vital signs normal or at patient baseline   Met (Vitally stable, still weak but will be going to TCU)    Nurse to notify provider when observation goals have been met and patient is ready for discharge.        Pt A&OX2. Baseline dementia. [place]. VSS on RA. Denies pain. Assist of 2 GB/walker. Up in chair this shift. Mob carb diet. BG check. 89 & 105. Incontinent of stool. Sequeira catheter in place. PT recommending TCU. SW following for TCU placement pending. PIV SL. Continue to monitor.

## 2021-07-13 NOTE — PROGRESS NOTES
"A&OX4. Pleasant. Denies pain. [place]. Wife at bedside. Update given. K 3.7. No fever. VSS. Awaiting for a TCU spot. Latest vitals: Blood pressure 138/74, pulse 63, temperature 97.3  F (36.3  C), temperature source Oral, resp. rate 18, height 1.676 m (5' 6\"), weight 111.5 kg (245 lb 14.4 oz), SpO2 93 %.      "

## 2021-07-13 NOTE — DISCHARGE SUMMARY
[facility]  Hospitalist Discharge Summary      Date of Admission:  7/11/2021  Date of Discharge:  7/13/2021  Discharging Provider: Shefali Ambrose PA-C    Discharge Diagnoses   Acute encephalopathy, suspect infectious, improved   Catheter associated urinary tract infection  History of recurrent UTIs  Abnormal CXR, LLL infiltrate    Follow-ups Needed After Discharge   Follow-up Appointments     Follow Up and recommended labs and tests      Follow up with TCU provider.  No follow up labs or test are needed.           Unresulted Labs Ordered in the Past 30 Days of this Admission     Date and Time Order Name Status Description    7/11/2021  1:24 PM Blood Culture Hand, Left Preliminary     7/11/2021  1:24 PM Blood Culture Arm, Right Preliminary       These results will be followed up by PCP    Discharge Disposition   Discharged to short-term care facility  Condition at discharge: Stable    Hospital Course            Chavez Mccain is a 80 year old male with PMH of frontotemporal dementia, chronic indwelling sexton, CKD, DM2, HLD, HTN, who presented 7/11/21 with generalized weakness, poor appetites and hallucinations, found to have a UTI. Registered under observation status for further evaluation and treatment.      Acute encephalopathy, suspect infectious, improved   Catheter associated urinary tract infection  History of recurrent UTIs  Patient with history of UTIs associated with chronic indwelling sexton catheter. Prior urine cultures from 2019, 2017, 2015 have shown fluoroquinolone sensitivity. Developed typical foul and dark urine two to three days ago and started on cipro as outpatient. Symptoms then progressed and patient developed increasing weakness, confusion, possible hallucinations. Wife (primary caregiver) unable to take care of patient. UA continues to have pyuria. No leukocytosis. Ciprofloxacin is likely appropriate here however it is sometimes associated  with neurological symptoms. On the other hand, wife reports that patient has tolerated UTIs for ciprofloxacin before, and most likely his confusion is related to his UTI not antibiotics.  * Chronic sexton catheter exchanged in the ED 7/11, had been prescribed cipro starting 7/9/21  * Urine culture with 10,000-50,000 candida albicans; likely represents colonization given chronic Sexton. Pt had been on antibiotics for at least 2 days prior to ED presentation and urine collection, thus results skewed. Clinically improving on antibiotics  - Continue levofloxacin, transitioned to oral on 7/13 (day 3), plan for 5 day course. Note received cipro for 2 days PTA.   - Scheduled tylenol  - Wife did report concern of LLE weakness and word finding difficulties on admission; pt appears to be back to baseline on my assessment 7/12 and 7/13. No focal neuro deficits. CT head negative for acute pathology.  - PT consulted, recommend TCU. Family agreeable   - SW help facilitate discharge planning   - Close follow-up with PCP or TCU provider for hospital follow-up     Abnormal CXR, LLL infiltrate: Pt without URI sx. No SOB, cough or fever reported. No reported aspiration events. Question infectious etiology vs atelectasis.   - Monitor respiratory status   - Antibiotics as above   - Encourage pulmonary toilet with IS       Frontotemporal dementia  - Continue PTA Requip, donepezil, Celexa     CKD: Creatinine at baseline 0.9  - Monitor      DM2: A1C 5.8 on admission.   - Continue PTA metformin and glipizide  - Monitor for hypoglycemia while on fluoroquinolones    Recent Labs   Lab 07/13/21  1231 07/13/21  0817 07/13/21  0625 07/13/21  0323 07/13/21  0230 07/12/21  2109   * 91 91 117* 83 105*     HLD, HTN: BP elevated throughout stay, though improved on recheck 7/12 AM.   - Continue PTA irbesartan    Consultations This Hospital Stay   PHYSICAL THERAPY ADULT IP CONSULT  OCCUPATIONAL THERAPY ADULT IP CONSULT  CARE MANAGEMENT / SOCIAL  WORK IP CONSULT    Code Status   No CPR- Do NOT Intubate    Time Spent on this Encounter   I, Shefali Ambrose PA-C, personally saw the patient today and spent greater than 30 minutes discharging this patient.     Shefali Ambrose PA-C  [facility] OBSERVATION  [address] [address]  Phone: [phone]  ______________________________________________________________________    Physical Exam   Vital Signs: Temp: 97.3  F (36.3  C) Temp src: Oral BP: 138/74 Pulse: 63   Resp: 18 SpO2: 93 % O2 Device: None (Room air)    Weight: 245 lbs 14.4 oz     Refer to my physical exam from my progress note from earlier today.        Primary Care Physician   [facility]    Discharge Orders      Reason for your hospital stay    You were hospitalized for further evaluation of weakness and acute confusion, treated for a UTI.     Glucose monitor nursing POCT    Before meals and at bedtime     Sequeira catheter    To straight gravity drainage. Change catheter every 2 weeks and PRN for leaking or decreased uring output with signs of bladder distention. DO NOT change catheter without a specific MD order IF diagnosis of benign prostatic hypertrophy (BPH), neurogenic bladder, or other urological conditions     Activity - Up with nursing assistance     Follow Up and recommended labs and tests    Follow up with TCU provider.  No follow up labs or test are needed.     Encourage PO fluids     Additional Discharge Instructions    1) Complete antibiotic course (total of 5 days of Levaquin + 2 days of cipro prior to admission)   2) No additional changes to medication regimen   3) Close follow-up with TCU provider or PCP for hospital follow-up     Fall precautions     Advance Diet as Tolerated    Follow this diet upon discharge: Orders Placed This Encounter      Consistent Carb 60 grams CHO per Meal Diet       Significant Results and Procedures   Most Recent  3 CBC's:  Recent Labs   Lab Test 07/13/21  0625 07/12/21  0641 07/11/21  1359   WBC 6.8 6.3 6.4  6.4   HGB 11.2* 10.8* 11.9*  11.9*   MCV 95 94 95  95    172 190  190     Most Recent 3 BMP's:  Recent Labs   Lab Test 07/13/21  1231 07/13/21  0817 07/13/21  0625 07/12/21  0641 07/11/21  1359   NA  --   --  138 142 141   POTASSIUM  --   --  3.7 3.7 3.7   CHLORIDE  --   --  107 110* 109   CO2  --   --  26 29 30   BUN  --   --  21 17 18   CR  --   --  1.03 0.95 0.92   ANIONGAP  --   --  5 3 2*   CLAUDIA  --   --  8.5 8.3* 8.7   * 91 91 78 174*     Most Recent 2 LFT's:  Recent Labs   Lab Test 07/11/21  1359 06/15/19  0947   AST 38 72*   ALT 60 87*   ALKPHOS 97 91   BILITOTAL 0.6 0.6     Most Recent 6 Bacteria Isolates From Any Culture (See EPIC Reports for Culture Details):  Recent Labs   Lab Test 06/15/19  0947 06/23/17  2329 07/10/16  1055 09/02/15  1658 03/02/15  1145 02/26/15  1127   CULT >100,000 colonies/mL  Citrobacter freundii complex  *  10,000 to 50,000 colonies/mL  Candida albicans / dubliniensis  Candida albicans and Candida dubliniensis are not routinely speciated  Susceptibility testing not routinely done  * 50,000 to 100,000 colonies/mL Citrobacter freundii complex* No growth >100,000 colonies/mL Klebsiella pneumoniae* No growth >100,000 colonies/mL Klebsiella pneumoniae  >100,000 colonies/mL Staphylococcus aureus  10,000 to 50,000 colonies/mL Strain 2 Klebsiella pneumoniae  *     Most Recent 6 glucoses:  Recent Labs   Lab Test 07/13/21  1231 07/13/21  0817 07/13/21  0625 07/13/21  0323 07/13/21  0230 07/12/21  2109   * 91 91 117* 83 105*     Most Recent Urinalysis:  Recent Labs   Lab Test 07/11/21  1407   COLOR Light Yellow   APPEARANCE Slightly Cloudy*   URINEGLC Negative   URINEBILI Negative   URINEKETONE Negative   SG 1.010   UBLD Small*   URINEPH 5.5   PROTEIN 50 *   NITRITE Negative   LEUKEST Large*   RBCU 7*   WBCU 29*   ,   Results for orders placed or performed during the  hospital encounter of 07/11/21   CT Head w/o Contrast    Narrative    CT SCAN OF THE HEAD WITHOUT CONTRAST   7/11/2021 2:42 PM     HISTORY: Recent left leg weakness, no known trauma.    TECHNIQUE:  Axial images of the head and coronal reformations without  IV contrast material. Radiation dose for this scan was reduced using  automated exposure control, adjustment of the mA and/or kV according  to patient size, or iterative reconstruction technique.    COMPARISON: 2/26/2015    FINDINGS:     Intracranial contents: No evidence for hyperdense hemorrhage. No mass,  mass effect or evidence for acute infarction. No extra-axial blood  products or fluid collections are identified. Satisfactory ventricular  caliber. Sella appears within normal limits. Moderate chronic ischemic  changes deep white matter both cerebral hemispheres. Moderate  generalized cerebral and cerebellar parenchymal volume loss. Vascular  calcification. No acute orbital process is identified. No extra-axial  hemorrhage. There is no evidence of intracranial hemorrhage, mass,  acute infarct or anomaly.    Visualized orbits/sinuses/mastoids:  Very mild membrane thickening  paranasal sinuses. Postoperative changes left-sided mastoid air cells.  Opacification remaining left-sided mastoid air cells and left middle  ear with minimal fluid right-sided mastoid air cells inferiorly. Soft  tissue debris at the mastoid bowl.    Osseous structures/soft tissues:  Satisfactory mineralization. No  acute fracture of the calvarium or skull base. No significant swelling  of the facial or scalp tissues is evident.      Impression    IMPRESSION:  1. No acute process intracranially.  2. Chronic ischemic changes and parenchymal volume loss as above have  progressed from 2/26/2015.  3. Postoperative changes left-sided mastoid air cells as before.  4. See above for full description.      NAVEEN FRANCO MD         SYSTEM ID:  [id number]   XR Chest 2 Views    Narrative    CHEST  TWO VIEW   7/11/2021 2:19 PM     HISTORY: Weakness, confusion, no fever/cough.    COMPARISON: Chest x-rays dated 12/6/2011.    FINDINGS:  Cardiac silhouette appears enlarged but this could be due  to the AP technique. It is unchanged since the prior study. Subtle  density left lateral costophrenic angle on the PA view could represent  atelectasis. Small infiltrates considered less likely. Lungs are  otherwise clear. There are thoracic aortic calcifications. Mediastinum  is grossly within normal limits. No pneumothorax or right pleural  fluid collection. No acute osseous fracture.      Impression    IMPRESSION:  1. Subtle density in the left lateral inferior base could represent  atelectasis, infiltrate, or effusion.  2. Top normal sized cardiac silhouette is likely due to the AP  technique.  3. Thoracic aortic calcifications.  4. No other evidence of acute cardiopulmonary disease is seen.    NAVEEN MOTA MD         SYSTEM ID:  [id number]       Discharge Medications   Current Discharge Medication List      START taking these medications    Details   levofloxacin (LEVAQUIN) 500 MG tablet Take 1 tablet (500 mg) by mouth every 24 hours for 2 days    Associated Diagnoses: Complicated UTI (urinary tract infection)         CONTINUE these medications which have NOT CHANGED    Details   atorvastatin (LIPITOR) 20 MG tablet Take 20 mg by mouth every morning       Cholecalciferol (VITAMIN D3) 1000 UNIT TABS Take 5,000 Units by mouth every evening       citalopram (CELEXA) 20 MG tablet Take 20 mg by mouth daily       donepezil (ARICEPT) 10 MG tablet Take 10 mg by mouth daily       finasteride (PROSCAR) 5 MG tablet Take 5 mg by mouth daily      glipiZIDE (GLUCOTROL XL) 5 MG 24 hr tablet Take 5 mg by mouth daily      hydrOXYzine (ATARAX) 25 MG tablet Take 25 mg by mouth 3 times daily as needed for itching      irbesartan (AVAPRO) 300 MG tablet Take 300 mg by mouth daily.      loperamide (IMODIUM) 2 MG capsule Take 2 mg by mouth 2  times daily      metFORMIN (GLUCOPHAGE-XR) 500 MG 24 hr tablet Take 1,000 mg by mouth daily      miconazole (MICATIN) 2 % cream Apply topically 2 times daily      nystatin (MYCOSTATIN) 805411 UNIT/GM POWD Apply 1 g topically 2 times daily as needed      !! rOPINIRole (REQUIP) 0.25 MG tablet Take 0.25 mg by mouth 3 times daily      !! rOPINIRole (REQUIP) 0.5 MG tablet Take 0.5 mg by mouth At Bedtime      triamcinolone (KENALOG) 0.025 % cream Apply 1 applicator topically 2 times daily       !! - Potential duplicate medications found. Please discuss with provider.      STOP taking these medications       ciprofloxacin (CIPRO) 250 MG tablet Comments:   Reason for Stopping:         nitroFURantoin macrocrystal (MACRODANTIN) 50 MG capsule Comments:   Reason for Stopping:             Allergies   No Known Allergies

## 2021-07-13 NOTE — PROGRESS NOTES
Care Management Follow Up    Length of Stay (days): 2    Expected Discharge Date:  7/13/21     Concerns to be Addressed: discharge planning     Patient plan of care discussed at interdisciplinary rounds: Yes    Anticipated Discharge Disposition: Skilled Nursing Facilty     Anticipated Discharge Services:    Anticipated Discharge DME:      Patient/family educated on Medicare website which has current facility and service quality ratings: yes  Education Provided on the Discharge Plan:    Patient/Family in Agreement with the Plan: yes    Referrals Placed by CM/SW: Senior Linkage Line, Transportation, Post Acute Facilities  Private pay costs discussed: private room/amenity fees and insurance costs out of pocket expenses and co-pays    Additional Information:  SW spoke to patient and wife Maria Dolores regarding need for additional TCU choices. Additional referrals sent and pending.   Family to transport at discharge.    SW to continue to follow and assist with discharge planning.    ALEX Javed  Daytime (8:00am-4:30pm): [phone]  After-Hours SW Pager (4:30pm-11:30pm): [phone]           ALEX Guevara

## 2021-07-13 NOTE — PROGRESS NOTES
Observation goals:    -diagnostic tests and consults completed and resulted- Not met  -vital signs normal or at patient baseline- Not met, hypertensive.   Nurse to notify provider when observation goals have been met and patient is ready for discharge.

## 2021-07-13 NOTE — PROGRESS NOTES
Observation Goals      -Diagnostic tests and consults completed and resulted-  Not Met (placement pending)     -Vital signs normal or at patient baseline   Met (Vitally stable, still weak but will be going to TCU)    Nurse to notify provider when observation goals have been met and patient is ready for discharge.        Pt A&O. Baseline dementia. [place]. VSS on RA except HTN. Denies pain. Assist of 2 GB/walker. Up in chair this shift. Mob carb diet. BG check. 83 & 117. Incontinent of stool. Sequeira catheter in place. C-pap at night. PT recommending TCU. SW following for TCU placement pending. PIV SL. Continue to monitor.

## 2021-07-14 VITALS
HEART RATE: 59 BPM | RESPIRATION RATE: 16 BRPM | OXYGEN SATURATION: 95 % | BODY MASS INDEX: 39.58 KG/M2 | WEIGHT: 246.3 LBS | TEMPERATURE: 96.3 F | SYSTOLIC BLOOD PRESSURE: 172 MMHG | DIASTOLIC BLOOD PRESSURE: 57 MMHG | HEIGHT: 66 IN

## 2021-07-14 LAB
GLUCOSE BLDC GLUCOMTR-MCNC: 142 MG/DL (ref 70–99)
GLUCOSE BLDC GLUCOMTR-MCNC: 96 MG/DL (ref 70–99)

## 2021-07-14 PROCEDURE — 99207 PR NO CHARGE LOS: CPT | Performed by: PHYSICIAN ASSISTANT

## 2021-07-14 PROCEDURE — G0378 HOSPITAL OBSERVATION PER HR: HCPCS

## 2021-07-14 PROCEDURE — 96372 THER/PROPH/DIAG INJ SC/IM: CPT

## 2021-07-14 PROCEDURE — 82962 GLUCOSE BLOOD TEST: CPT

## 2021-07-14 PROCEDURE — 250N000013 HC RX MED GY IP 250 OP 250 PS 637: Performed by: INTERNAL MEDICINE

## 2021-07-14 RX ADMIN — FINASTERIDE 5 MG: 5 TABLET, FILM COATED ORAL at 08:30

## 2021-07-14 RX ADMIN — METFORMIN ER 500 MG 1000 MG: 500 TABLET ORAL at 08:30

## 2021-07-14 RX ADMIN — IRBESARTAN 300 MG: 150 TABLET ORAL at 08:30

## 2021-07-14 RX ADMIN — ROPINIROLE HYDROCHLORIDE 0.25 MG: 0.25 TABLET, FILM COATED ORAL at 08:30

## 2021-07-14 RX ADMIN — INSULIN ASPART 1 UNITS: 100 INJECTION, SOLUTION INTRAVENOUS; SUBCUTANEOUS at 09:04

## 2021-07-14 RX ADMIN — GLIPIZIDE 5 MG: 5 TABLET, FILM COATED, EXTENDED RELEASE ORAL at 08:31

## 2021-07-14 RX ADMIN — ATORVASTATIN CALCIUM 20 MG: 20 TABLET, FILM COATED ORAL at 08:30

## 2021-07-14 RX ADMIN — DONEPEZIL HYDROCHLORIDE 10 MG: 10 TABLET ORAL at 08:31

## 2021-07-14 RX ADMIN — CITALOPRAM HYDROBROMIDE 20 MG: 20 TABLET ORAL at 08:30

## 2021-07-14 RX ADMIN — ACETAMINOPHEN 975 MG: 325 TABLET, FILM COATED ORAL at 08:34

## 2021-07-14 ASSESSMENT — MIFFLIN-ST. JEOR: SCORE: 1764.96

## 2021-07-14 NOTE — PLAN OF CARE
A&Ox3, Disoriented to situation. VSS on RA. Denies pain. Tolerating mod carb diet. . Sequeira intact with adequate UOP. +BS. Stool x1-continent. Up Ax1 walker+GB.     Pt discharged to [facility] at 1010, ride given by family. Discharge instruction reviewed with Pt, questions answered. Sent all personal belongings and discharge documents with Pt.

## 2021-07-14 NOTE — PROGRESS NOTES
Care Management Discharge Note    Discharge Date: 07/14/2021       Discharge Disposition: Skilled Nursing Facilty- [facility]    Discharge Services:      Discharge DME:      Discharge Transportation: family will provide    Private pay costs discussed: insurance costs out of pocket expenses and co-pays    PAS Confirmation Code: [id number]  Patient/family educated on Medicare website which has current facility and service quality ratings: yes    Education Provided on the Discharge Plan:  yes  Persons Notified of Discharge Plans: patient, wife, medical team, TCU  Patient/Family in Agreement with the Plan: yes    Handoff Referral Completed: No    Additional Information:  DC orders: sent via DOD  Scripts: none on chart  PAS: completed, faxed and placed on chart  Trans: family at 0930    SW has identified no other social service needs at this time. SW will remain available should other needs arise.    ALEX Javed  Daytime (8:00am-4:30pm): [phone]  After-Hours SW Pager (4:30pm-11:30pm): [phone]               ALEX Guevara

## 2021-07-14 NOTE — PROGRESS NOTES
Plan was for discharge 7/13, but delayed d/t need for insurance prior auth.  Patient without questions or concerns. Appears well sitting upright in chair finishing breakfast.  Discharge plan reviewed and no changes.    Please see discharge summary by Shefali Ambrose from 7/13/2021 for more complete details.  - Discharge today.    No charge visit.

## 2021-07-14 NOTE — PLAN OF CARE
Physical Therapy Discharge Summary    Reason for therapy discharge:    Discharged to transitional care facility.    Progress towards therapy goal(s). See goals on Care Plan in [facility] electronic health record for goal details.  Goals not met.  Barriers to achieving goals:   discharge from facility.    Therapy recommendation(s):    Continued therapy is recommended.  Rationale/Recommendations:  eval and treat at TCU.

## 2021-07-14 NOTE — PROGRESS NOTES
AxOx4. VSS on RA, hypertensive. PRN hydralazine for SB>180, none given, order parameters not met. Generalized weakness. SBA GB/walker. Forgetful. [place]. Mod carb diet. BS checks,  and 137, no correction needed. Discharging to TCU tomorrow, waiting on insurance authorization. Chronic sexton in place, will discharge with. Large incontinent stool this evening. Continue to monitor.

## 2021-07-14 NOTE — PLAN OF CARE
AVSS; pt denied pain; disoriented to situation; sexton with good urine output; pt declined scheduled tylenol overnight; appeared to sleep well overnight.

## 2021-07-16 LAB
BACTERIA BLD CULT: NO GROWTH
BACTERIA BLD CULT: NO GROWTH

## 2021-09-13 NOTE — IP AVS SNAPSHOT
MRN:[id number]                      After Visit Summary   8/23/2017    Chavez Mccain    MRN: [id number]           Thank you!     Thank you for choosing [facility] for your care. Our goal is always to provide you with excellent care. Hearing back from our patients is one way we can continue to improve our services. Please take a few minutes to complete the written survey that you may receive in the mail after you visit with us. Thank you!        Patient Information     Date Of Birth          1940        Designated Caregiver       Most Recent Value    Caregiver    Will someone help with your care after discharge? yes    Name of designated caregiver mango     Phone number of caregiver [phone]    Caregiver address [address] [facility] [address]      About your hospital stay     You were admitted on:  August 23, 2017 You last received care in the:  [place] [address] [facility]    You were discharged on:  August 24, 2017        Reason for your hospital stay       You were hospitalized for further evaluation and treatment of generalized weakness and fall.                  Who to Call     For medical emergencies, please call 911.  For non-urgent questions about your medical care, please call your primary care provider or clinic, [phone]          Attending Provider     Provider Specialty    Morgan Banegas MD Internal Medicine       Primary Care Provider Office Phone # Fax #    Vasile Brooks [phone]      After Care Instructions     Activity - Up with nursing assistance           Additional Discharge Instructions       1. Follow up with PCP in a week to discuss hospitalization, discuss need to further uptitrate diabetes meds, and repeat BMP   2. Glipizide increased to 10 mg by mouth daily, if blood glucose remains elevated, consider initiation of metformin  3. Adequate oral fluid intake encouraged            Advance Diet as Tolerated        Follow this diet upon discharge: Moderate Consistent CHO Diet            Daily weights       Call Provider for weight gain of more than 2 pounds per day or 5 pounds per week.            Encourage PO fluids           Fall precautions           Sequeira catheter       To straight gravity drainage. Change catheter every 2 weeks and PRN for leaking or decreased uring output with signs of bladder distention. DO NOT change catheter without a specific MD order IF diagnosis of benign prostatic hypertrophy (BPH), neurogenic bladder, or other urological conditions            General info for SNF       Length of Stay Estimate: Short Term Care: Estimated # of Days <30  Condition at Discharge: Stable  Level of care:skilled   Rehabilitation Potential: Fair  Admission H&P remains valid and up-to-date: Yes  Recent Chemotherapy: N/A  Use Nursing Home Standing Orders: Yes            Glucose monitor nursing POCT       Before meals and at bedtime            Hip precautions           Intake and output       Every shift            Mantoux instructions       Give two-step Mantoux (PPD) Per Facility Policy Yes                  Follow-up Appointments     Follow Up and recommended labs and tests       Follow up with [facility] physician.  The following labs/tests are recommended: glucose, BMP.                  Additional Services     Nutrition Services Adult IP Consult       Reason:  Poor oral fluid intake. Diabetic.            Occupational Therapy Adult Consult       Evaluate and treat as clinically indicated.    Reason:  Physical deconditioning            Physical Therapy Adult Consult       Evaluate and treat as clinically indicated.    Reason:  Physical deconditioning                  Pending Results     Date and Time Order Name Status Description    8/24/2017 1108 XR Ankle Left G/E 3 Views Preliminary             Statement of Approval     Ordered          08/24/17 7328  I have reviewed and agree with all the recommendations and orders  "detailed in this document.  EFFECTIVE NOW     Approved and electronically signed by:  Shefali Barajas PA-C             Admission Information     Date & Time Department Dept. Phone    [address] [place] [address] [facility] [phone]      Your Vitals Were     Blood Pressure Pulse Temperature Respirations Height Weight    136/67 (BP Location: Right arm) 87 97.7  F (36.5  C) (Oral) 18 1.626 m (5' 4\") 115.8 kg (255 lb 6.4 oz)    Pulse Oximetry BMI (Body Mass Index)                96% 43.84 kg/m2          MyChart Information     [employer] lets you send messages to your doctor, view your test results, renew your prescriptions, schedule appointments and more. To sign up, go to [email or web address] . Click on \"Log in\" on the left side of the screen, which will take you to the Welcome page. Then click on \"Sign up Now\" on the right side of the page.     You will be asked to enter the access code listed below, as well as some personal information. Please follow the directions to create your username and password.     Your access code is: [email or web address]  Expires: 2017 12:17 AM     Your access code will  in 90 days. If you need help or a new code, please call your [facility] clinic or [phone].        Care EveryWhere ID     This is your Care EveryWhere ID. This could be used by other organizations to access your [facility] medical records  [id number]        Equal Access to Services     [facility] AH: Hadii lawson figueroa hadasho Soomaali, waaxda luqadaha, qaybta kaalmada adeegyada, elio guerrero. So [facility] [phone].    ATENCIÓN: Si habla español, tiene a cool disposición servicios gratuitos de asistencia lingüística. Llame al [phone].    We comply with applicable federal civil rights laws and [place] laws. We do not discriminate on the basis of race, color, national origin, age, disability sex, sexual orientation or gender identity.               Review of your " medicines      CONTINUE these medicines which may have CHANGED, or have new prescriptions. If we are uncertain of the size of tablets/capsules you have at home, strength may be listed as something that might have changed.        Dose / Directions    glipiZIDE 10 MG 24 hr tablet   Commonly known as:  GLUCOTROL XL   This may have changed:    - medication strength  - how much to take   Used for:  Type 2 diabetes mellitus without complication, without long-term current use of insulin (H)        Dose:  10 mg   Start taking on:  8/25/2017   Take 1 tablet (10 mg) by mouth daily (with breakfast)   Quantity:  30 tablet   Refills:  0         CONTINUE these medicines which have NOT CHANGED        Dose / Directions    aspirin 81 MG EC tablet        Dose:  81 mg   Take 81 mg by mouth every evening   Refills:  0       atorvastatin 10 MG tablet   Commonly known as:  LIPITOR        Dose:  10 mg   Take 10 mg by mouth every morning   Refills:  0       cholecalciferol 1000 UNIT tablet   Commonly known as:  vitamin D        Dose:  1000 Units   Take 1,000 Units by mouth every evening   Refills:  0       CITALOPRAM HYDROBROMIDE PO        Dose:  20 mg   Take 20 mg by mouth daily   Refills:  0       cyanocobalamin 1000 MCG Tabs        Dose:  1000 mcg   Take 1,000 mcg by mouth every evening   Refills:  0       DONEPEZIL HCL PO        Dose:  10 mg   Take 10 mg by mouth daily   Refills:  0       FINASTERIDE PO        Dose:  5 mg   Take 5 mg by mouth daily   Refills:  0       irbesartan 300 MG tablet   Commonly known as:  AVAPRO        Dose:  300 mg   Take 300 mg by mouth daily.   Refills:  0       KEFLEX PO   Indication:  UTI Prophylaxis        Dose:  250 mg   Take 250 mg by mouth daily   Refills:  0       loperamide 2 MG capsule   Commonly known as:  IMODIUM        Dose:  2 mg   Take 2 mg by mouth 2 times daily   Refills:  0       MAGNESIUM OXIDE PO   Indication:  Disorder with Low Magnesium Levels        Dose:  400 mg   Take 400 mg by mouth  daily   Refills:  0       metolazone 2.5 MG tablet   Commonly known as:  ZAROXOLYN        Dose:  2.5 mg   Take 2.5 mg by mouth daily.   Refills:  0       miconazole 2 % cream   Commonly known as:  MICATIN   Indication:  Ringworm of the Body, Ringworm of Groin Area        Apply topically 2 times daily   Refills:  0       nystatin 862604 UNIT/GM Powd   Commonly known as:  MYCOSTATIN   Indication:  Skin Infection due to Candida Yeast        Dose:  1 g   Apply 1 g topically 2 times daily as needed   Refills:  0       potassium chloride 10 MEQ CR tablet   Commonly known as:  KLOR-CON        Dose:  10 mEq   Take 10 mEq by mouth 3 times daily   Refills:  0       traZODone 100 MG tablet   Commonly known as:  DESYREL        Dose:  100 mg   Take 100 mg by mouth nightly as needed for sleep   Refills:  0            Where to get your medicines      Some of these will need a paper prescription and others can be bought over the counter. Ask your nurse if you have questions.     You don't need a prescription for these medications     glipiZIDE 10 MG 24 hr tablet                Protect others around you: Learn how to safely use, store and throw away your medicines at www.disposemymeds.org.             Medication List: This is a list of all your medications and when to take them. Check marks below indicate your daily home schedule. Keep this list as a reference.      Medications           Morning Afternoon Evening Bedtime As Needed    aspirin 81 MG EC tablet   Take 81 mg by mouth every evening   Next Dose Due:  Take this evening 8/24 8/24               atorvastatin 10 MG tablet   Commonly known as:  LIPITOR   Take 10 mg by mouth every morning   Next Dose Due:  Resume home schedule                                cholecalciferol 1000 UNIT tablet   Commonly known as:  vitamin D   Take 1,000 Units by mouth every evening   Next Dose Due:  Resume home schedule                                CITALOPRAM HYDROBROMIDE PO    Take 20 mg by mouth daily   Last time this was given:  20 mg on 8/24/2017  9:16 AM   Next Dose Due:  Take tomorrow AM 8/25 8/25                       cyanocobalamin 1000 MCG Tabs   Take 1,000 mcg by mouth every evening   Next Dose Due:  Resume home schedule                                DONEPEZIL HCL PO   Take 10 mg by mouth daily   Last time this was given:  10 mg on 8/24/2017  9:16 AM   Next Dose Due:  Take tomorrow AM 8/25 8/25                       FINASTERIDE PO   Take 5 mg by mouth daily   Last time this was given:  5 mg on 8/24/2017  9:16 AM   Next Dose Due:  Take tomorrow AM 8/25 8/25                       glipiZIDE 10 MG 24 hr tablet   Commonly known as:  GLUCOTROL XL   Take 1 tablet (10 mg) by mouth daily (with breakfast)   Start taking on:  8/25/2017   Last time this was given:  5 mg on 8/24/2017  9:16 AM   Next Dose Due:  Take tomorrow AM 8/25 8/25                       irbesartan 300 MG tablet   Commonly known as:  AVAPRO   Take 300 mg by mouth daily.   Last time this was given:  300 mg on 8/24/2017  9:16 AM   Next Dose Due:  Take tomorrow AM 8/25 8/25                       KEFLEX PO   Take 250 mg by mouth daily   Last time this was given:  250 mg on 8/24/2017  9:16 AM   Next Dose Due:  Take tomorrow AM 8/25 8/25                       loperamide 2 MG capsule   Commonly known as:  IMODIUM   Take 2 mg by mouth 2 times daily   Next Dose Due:  Resume home schedule                                MAGNESIUM OXIDE PO   Take 400 mg by mouth daily   Next Dose Due:  Resume home schedule                                metolazone 2.5 MG tablet   Commonly known as:  ZAROXOLYN   Take 2.5 mg by mouth daily.   Next Dose Due:  Resume home schedule                                miconazole 2 % cream   Commonly known as:  MICATIN   Apply topically 2 times daily   Next Dose Due:  Resume home schedule                                nystatin [id number] UNIT/GM  Powd   Commonly known as:  MYCOSTATIN   Apply 1 g topically 2 times daily as needed                                   potassium chloride 10 MEQ CR tablet   Commonly known as:  KLOR-CON   Take 10 mEq by mouth 3 times daily   Next Dose Due:  Resume home schedule                                traZODone 100 MG tablet   Commonly known as:  DESYREL   Take 100 mg by mouth nightly as needed for sleep   Last time this was given:  100 mg on 8/24/2017  2:55 AM                                             More Information        Fall Prevention  Falls often occur due to slipping, tripping or losing your balance. Millions of people fall every year and injure themselves. Here are ways to reduce your risk of falling again.    Think about your fall, was there anything that caused your fall that can be fixed, removed, or replaced?    Make your home safe by keeping walkways clear of objects you may trip over.    Use non-slip pads under rugs. Do not use area rugs or small throw rugs.    Use non-slip mats in bathtubs and showers.    Install handrails and lights on staircases.    Do not walk in poorly lit areas.    Do not stand on chairs or wobbly ladders.    Use caution when reaching overhead or looking upward. This position can cause a loss of balance.    Be sure your shoes fit properly, have non-slip bottoms and are in good condition.     Wear shoes both inside and out. Avoid going barefoot or wearing slippers.    Be cautious when going up and down stairs, curbs, and when walking on uneven sidewalks.    If your balance is poor, consider using a cane or walker.    If your fall was related to alcohol use, stop or limit alcohol intake.     If your fall was related to use of sleeping medicines, talk to your doctor about this. You may need to reduce your dosage at bedtime if you awaken during the night to go to the bathroom.      To reduce the need for nighttime bathroom trips:    Avoid drinking fluids for several hours before going to  bed    Empty your bladder before going to bed    Men can keep a urinal at the bedside    Stay as active as you can. Balance, flexibility, strength, and endurance all come from exercise. They all play a role in preventing falls. Ask your healthcare provider which types of activity are right for you.    Get your vision checked on a regular basis.    If you have pets, know where they are before you stand up or walk so you don't trip over them.    Use night lights.  Date Last Reviewed: 11/5/2015 2000-2017 [employer]. [address] [place] [address], [place] [address]. All rights reserved. This information is not intended as a substitute for professional medical care. Always follow your healthcare professional's instructions.                Preventing Falls: Are You At Risk of Falling?     Ask for help to reduce risk of falling in your home.     As you get older, you're not as steady on your feet as you once were. And you may have health problems you didn't have when you were younger. So, it's not surprising that older people are more likely to trip and fall. Falling can be very serious. It can change your overall health and quality of life. That's why it's important to be aware of your own risk of falling.  The dangers of falling  Falls are one of the main causes of injury in people over age 65. An older person who falls may take longer to get better than a younger person. And, after a fall, an older person is more likely to have problems that don't go away. So, preventing falls can help you avoid serious health problems.  Are you at risk of falling?  Answer these questions to rate your level of risk.    Are you a woman?    Have you fallen or stumbled in the last year?    Are you over age 65?    Are you ever dizzy or lightheaded with standing?    Do you have a hard time getting in and out of the bathtub or on and off the toilet?    Do you lean on objects to help you get around? Or do you use a cane or  "walker?    Do you have vision or hearing problems? For example, do you need new glasses or hearing aids?    Do you have 2 or more long-lasting (chronic) medical conditions?    Do you take 3 or more medicines?    Have you felt depressed recently?    Have you had more trouble with your memory in recent months?    Are there hazards in your home that might cause you to fall, such as loose rugs or poor lighting?    Do you have a pet that jumps on you or might trip you?    Have you stopped getting regular exercise?    Do you have diabetes?     Do you have a neurologic disease, such as Parkinson or Alzheimer disease?     Do you drink alcohol?    Do you wear athletic shoes or slippers, or go barefoot at home?  You can help prevent falls  If you answered \"yes\" to any of the above questions, you should take steps to reduce your risk of a fall. Monitoring health conditions and keeping walkways in your home free of clutter are just 2 ways. Changing is sometimes easier said than done. But keep in mind that even small changes can make you less likely to fall.  The fear of falling  It's normal to be scared of falling, especially if you've fallen before. But being afraid can actually make you more likely to fall. This is because:    Fear might cause you to become less active. Being less active can lead to a loss of strength and balance.    Fear can lead to isolation from others, depression, or the use of more medicines or alcohol. And all these things make falling even more likely.  To break the cycle, learn more about ways to avoid falling. As you take control, you may find yourself feeling less afraid.   Date Last Reviewed: 6/12/2015 2000-2017 [employer]. [address] [place] [address]. All rights reserved. This information is not intended as a substitute for professional medical care. Always follow your healthcare professional's instructions.             " Urinary urgency

## 2022-01-10 ENCOUNTER — APPOINTMENT (OUTPATIENT)
Dept: GENERAL RADIOLOGY | Facility: CLINIC | Age: 82
DRG: 698 | End: 2022-01-10
Attending: EMERGENCY MEDICINE
Payer: COMMERCIAL

## 2022-01-10 ENCOUNTER — HOSPITAL ENCOUNTER (INPATIENT)
Facility: CLINIC | Age: 82
LOS: 7 days | Discharge: HOSPICE | DRG: 698 | End: 2022-01-17
Attending: EMERGENCY MEDICINE | Admitting: HOSPITALIST
Payer: COMMERCIAL

## 2022-01-10 DIAGNOSIS — L03.116 CELLULITIS OF LEFT LOWER EXTREMITY: ICD-10-CM

## 2022-01-10 DIAGNOSIS — N39.0 COMPLICATED URINARY TRACT INFECTION: ICD-10-CM

## 2022-01-10 DIAGNOSIS — A41.9 SEVERE SEPSIS (H): ICD-10-CM

## 2022-01-10 DIAGNOSIS — F02.80 FRONTOTEMPORAL DEMENTIA (H): Primary | ICD-10-CM

## 2022-01-10 DIAGNOSIS — R65.20 SEVERE SEPSIS (H): ICD-10-CM

## 2022-01-10 DIAGNOSIS — G31.09 FRONTOTEMPORAL DEMENTIA (H): Primary | ICD-10-CM

## 2022-01-10 LAB
ALBUMIN SERPL-MCNC: 2.8 G/DL (ref 3.4–5)
ALBUMIN UR-MCNC: 600 MG/DL
ALP SERPL-CCNC: 107 U/L (ref 40–150)
ALT SERPL W P-5'-P-CCNC: 49 U/L (ref 0–70)
ANION GAP SERPL CALCULATED.3IONS-SCNC: 7 MMOL/L (ref 3–14)
APPEARANCE UR: ABNORMAL
APTT PPP: 28 SECONDS (ref 22–38)
AST SERPL W P-5'-P-CCNC: 31 U/L (ref 0–45)
BACTERIA #/AREA URNS HPF: ABNORMAL /HPF
BASOPHILS # BLD AUTO: 0.1 10E3/UL (ref 0–0.2)
BASOPHILS NFR BLD AUTO: 0 %
BILIRUB SERPL-MCNC: 1.1 MG/DL (ref 0.2–1.3)
BILIRUB UR QL STRIP: NEGATIVE
BUN SERPL-MCNC: 19 MG/DL (ref 7–30)
CALCIUM SERPL-MCNC: 8.5 MG/DL (ref 8.5–10.1)
CHLORIDE BLD-SCNC: 106 MMOL/L (ref 94–109)
CO2 SERPL-SCNC: 25 MMOL/L (ref 20–32)
COLOR UR AUTO: ABNORMAL
CREAT SERPL-MCNC: 1.04 MG/DL (ref 0.66–1.25)
EOSINOPHIL # BLD AUTO: 0 10E3/UL (ref 0–0.7)
EOSINOPHIL NFR BLD AUTO: 0 %
ERYTHROCYTE [DISTWIDTH] IN BLOOD BY AUTOMATED COUNT: 13.7 % (ref 10–15)
FLUAV RNA SPEC QL NAA+PROBE: NEGATIVE
FLUBV RNA RESP QL NAA+PROBE: NEGATIVE
GFR SERPL CREATININE-BSD FRML MDRD: 72 ML/MIN/1.73M2
GLUCOSE BLD-MCNC: 222 MG/DL (ref 70–99)
GLUCOSE UR STRIP-MCNC: NEGATIVE MG/DL
HCO3 BLDV-SCNC: 27 MMOL/L (ref 21–28)
HCT VFR BLD AUTO: 35.3 % (ref 40–53)
HGB BLD-MCNC: 11.4 G/DL (ref 13.3–17.7)
HGB UR QL STRIP: ABNORMAL
HOLD SPECIMEN: NORMAL
HOLD SPECIMEN: NORMAL
IMM GRANULOCYTES # BLD: 0.2 10E3/UL
IMM GRANULOCYTES NFR BLD: 1 %
INR PPP: 1.1 (ref 0.85–1.15)
KETONES UR STRIP-MCNC: NEGATIVE MG/DL
LACTATE BLD-SCNC: 3.5 MMOL/L
LACTATE SERPL-SCNC: 2.9 MMOL/L (ref 0.7–2)
LEUKOCYTE ESTERASE UR QL STRIP: ABNORMAL
LYMPHOCYTES # BLD AUTO: 0.4 10E3/UL (ref 0.8–5.3)
LYMPHOCYTES NFR BLD AUTO: 2 %
MCH RBC QN AUTO: 29.9 PG (ref 26.5–33)
MCHC RBC AUTO-ENTMCNC: 32.3 G/DL (ref 31.5–36.5)
MCV RBC AUTO: 93 FL (ref 78–100)
MONOCYTES # BLD AUTO: 0.9 10E3/UL (ref 0–1.3)
MONOCYTES NFR BLD AUTO: 4 %
MUCOUS THREADS #/AREA URNS LPF: PRESENT /LPF
NEUTROPHILS # BLD AUTO: 19.3 10E3/UL (ref 1.6–8.3)
NEUTROPHILS NFR BLD AUTO: 93 %
NITRATE UR QL: NEGATIVE
NRBC # BLD AUTO: 0 10E3/UL
NRBC BLD AUTO-RTO: 0 /100
PCO2 BLDV: 43 MM HG (ref 40–50)
PH BLDV: 7.41 [PH] (ref 7.32–7.43)
PH UR STRIP: 5.5 [PH] (ref 5–7)
PLATELET # BLD AUTO: 203 10E3/UL (ref 150–450)
PO2 BLDV: 26 MM HG (ref 25–47)
POTASSIUM BLD-SCNC: 4.2 MMOL/L (ref 3.4–5.3)
PROCALCITONIN SERPL-MCNC: 3.6 NG/ML
PROT SERPL-MCNC: 7 G/DL (ref 6.8–8.8)
RBC # BLD AUTO: 3.81 10E6/UL (ref 4.4–5.9)
RBC URINE: >182 /HPF
SAO2 % BLDV: 49 % (ref 94–100)
SARS-COV-2 RNA RESP QL NAA+PROBE: NEGATIVE
SODIUM SERPL-SCNC: 138 MMOL/L (ref 133–144)
SP GR UR STRIP: 1.02 (ref 1–1.03)
UROBILINOGEN UR STRIP-MCNC: NORMAL MG/DL
WBC # BLD AUTO: 20.9 10E3/UL (ref 4–11)
WBC URINE: >182 /HPF
YEAST #/AREA URNS HPF: ABNORMAL /HPF

## 2022-01-10 PROCEDURE — 99223 1ST HOSP IP/OBS HIGH 75: CPT | Mod: AI | Performed by: HOSPITALIST

## 2022-01-10 PROCEDURE — 96366 THER/PROPH/DIAG IV INF ADDON: CPT

## 2022-01-10 PROCEDURE — 83036 HEMOGLOBIN GLYCOSYLATED A1C: CPT | Performed by: HOSPITALIST

## 2022-01-10 PROCEDURE — 99291 CRITICAL CARE FIRST HOUR: CPT | Mod: 25

## 2022-01-10 PROCEDURE — 81001 URINALYSIS AUTO W/SCOPE: CPT | Performed by: EMERGENCY MEDICINE

## 2022-01-10 PROCEDURE — C9803 HOPD COVID-19 SPEC COLLECT: HCPCS

## 2022-01-10 PROCEDURE — 83605 ASSAY OF LACTIC ACID: CPT | Performed by: EMERGENCY MEDICINE

## 2022-01-10 PROCEDURE — 258N000003 HC RX IP 258 OP 636: Performed by: EMERGENCY MEDICINE

## 2022-01-10 PROCEDURE — 96361 HYDRATE IV INFUSION ADD-ON: CPT

## 2022-01-10 PROCEDURE — 85004 AUTOMATED DIFF WBC COUNT: CPT | Performed by: EMERGENCY MEDICINE

## 2022-01-10 PROCEDURE — 71045 X-RAY EXAM CHEST 1 VIEW: CPT

## 2022-01-10 PROCEDURE — 85730 THROMBOPLASTIN TIME PARTIAL: CPT | Performed by: EMERGENCY MEDICINE

## 2022-01-10 PROCEDURE — 96365 THER/PROPH/DIAG IV INF INIT: CPT

## 2022-01-10 PROCEDURE — 36415 COLL VENOUS BLD VENIPUNCTURE: CPT | Performed by: EMERGENCY MEDICINE

## 2022-01-10 PROCEDURE — 96368 THER/DIAG CONCURRENT INF: CPT

## 2022-01-10 PROCEDURE — 85610 PROTHROMBIN TIME: CPT | Performed by: EMERGENCY MEDICINE

## 2022-01-10 PROCEDURE — 120N000001 HC R&B MED SURG/OB

## 2022-01-10 PROCEDURE — 80053 COMPREHEN METABOLIC PANEL: CPT | Performed by: EMERGENCY MEDICINE

## 2022-01-10 PROCEDURE — 87086 URINE CULTURE/COLONY COUNT: CPT | Performed by: EMERGENCY MEDICINE

## 2022-01-10 PROCEDURE — 84145 PROCALCITONIN (PCT): CPT | Performed by: EMERGENCY MEDICINE

## 2022-01-10 PROCEDURE — 87636 SARSCOV2 & INF A&B AMP PRB: CPT | Performed by: EMERGENCY MEDICINE

## 2022-01-10 PROCEDURE — 82803 BLOOD GASES ANY COMBINATION: CPT

## 2022-01-10 PROCEDURE — 51702 INSERT TEMP BLADDER CATH: CPT

## 2022-01-10 PROCEDURE — 250N000011 HC RX IP 250 OP 636: Performed by: EMERGENCY MEDICINE

## 2022-01-10 PROCEDURE — 87040 BLOOD CULTURE FOR BACTERIA: CPT | Performed by: EMERGENCY MEDICINE

## 2022-01-10 RX ORDER — PIPERACILLIN SODIUM, TAZOBACTAM SODIUM 4; .5 G/20ML; G/20ML
4.5 INJECTION, POWDER, LYOPHILIZED, FOR SOLUTION INTRAVENOUS ONCE
Status: COMPLETED | OUTPATIENT
Start: 2022-01-10 | End: 2022-01-10

## 2022-01-10 RX ORDER — PIPERACILLIN SODIUM, TAZOBACTAM SODIUM 4; .5 G/20ML; G/20ML
4.5 INJECTION, POWDER, LYOPHILIZED, FOR SOLUTION INTRAVENOUS EVERY 6 HOURS
Status: DISCONTINUED | OUTPATIENT
Start: 2022-01-11 | End: 2022-01-12

## 2022-01-10 RX ORDER — SODIUM CHLORIDE 9 MG/ML
INJECTION, SOLUTION INTRAVENOUS CONTINUOUS
Status: DISCONTINUED | OUTPATIENT
Start: 2022-01-10 | End: 2022-01-12

## 2022-01-10 RX ORDER — PIPERACILLIN SODIUM, TAZOBACTAM SODIUM 3; .375 G/15ML; G/15ML
3.38 INJECTION, POWDER, LYOPHILIZED, FOR SOLUTION INTRAVENOUS EVERY 6 HOURS
Status: DISCONTINUED | OUTPATIENT
Start: 2022-01-11 | End: 2022-01-10

## 2022-01-10 RX ADMIN — PIPERACILLIN SODIUM AND TAZOBACTAM SODIUM 4.5 G: 4; .5 INJECTION, POWDER, LYOPHILIZED, FOR SOLUTION INTRAVENOUS at 19:17

## 2022-01-10 RX ADMIN — SODIUM CHLORIDE 1000 ML: 9 INJECTION, SOLUTION INTRAVENOUS at 18:28

## 2022-01-10 RX ADMIN — SODIUM CHLORIDE 2267 ML: 9 INJECTION, SOLUTION INTRAVENOUS at 19:18

## 2022-01-10 RX ADMIN — VANCOMYCIN HYDROCHLORIDE 2500 MG: 5 INJECTION, POWDER, LYOPHILIZED, FOR SOLUTION INTRAVENOUS at 19:39

## 2022-01-10 ASSESSMENT — ENCOUNTER SYMPTOMS
ACTIVITY CHANGE: 1
NAUSEA: 0
FATIGUE: 1
DIAPHORESIS: 0
ABDOMINAL PAIN: 0
VOMITING: 0
SHORTNESS OF BREATH: 0
DYSURIA: 0
DIARRHEA: 0
SORE THROAT: 0
COUGH: 0
APPETITE CHANGE: 1
FEVER: 0

## 2022-01-10 ASSESSMENT — ACTIVITIES OF DAILY LIVING (ADL)
ADLS_ACUITY_SCORE: 14

## 2022-01-10 ASSESSMENT — MIFFLIN-ST. JEOR: SCORE: 1699.63

## 2022-01-10 NOTE — ED NOTES
Bed: ED24  Expected date:   Expected time:   Means of arrival:   Comments:  535  81 M lethargic/disoriented  2873

## 2022-01-11 ENCOUNTER — APPOINTMENT (OUTPATIENT)
Dept: ULTRASOUND IMAGING | Facility: CLINIC | Age: 82
DRG: 698 | End: 2022-01-11
Attending: HOSPITALIST
Payer: COMMERCIAL

## 2022-01-11 LAB
GLUCOSE BLDC GLUCOMTR-MCNC: 101 MG/DL (ref 70–99)
GLUCOSE BLDC GLUCOMTR-MCNC: 149 MG/DL (ref 70–99)
GLUCOSE BLDC GLUCOMTR-MCNC: 157 MG/DL (ref 70–99)
GLUCOSE BLDC GLUCOMTR-MCNC: 187 MG/DL (ref 70–99)
GLUCOSE BLDC GLUCOMTR-MCNC: 98 MG/DL (ref 70–99)
HBA1C MFR BLD: 7.7 % (ref 0–5.6)
LACTATE SERPL-SCNC: 1.3 MMOL/L (ref 0.7–2)

## 2022-01-11 PROCEDURE — 250N000011 HC RX IP 250 OP 636: Performed by: INTERNAL MEDICINE

## 2022-01-11 PROCEDURE — 96366 THER/PROPH/DIAG IV INF ADDON: CPT

## 2022-01-11 PROCEDURE — 99232 SBSQ HOSP IP/OBS MODERATE 35: CPT | Performed by: HOSPITALIST

## 2022-01-11 PROCEDURE — 258N000003 HC RX IP 258 OP 636: Performed by: EMERGENCY MEDICINE

## 2022-01-11 PROCEDURE — 250N000011 HC RX IP 250 OP 636: Performed by: HOSPITALIST

## 2022-01-11 PROCEDURE — 250N000012 HC RX MED GY IP 250 OP 636 PS 637: Performed by: HOSPITALIST

## 2022-01-11 PROCEDURE — 96365 THER/PROPH/DIAG IV INF INIT: CPT

## 2022-01-11 PROCEDURE — 250N000013 HC RX MED GY IP 250 OP 250 PS 637: Performed by: HOSPITALIST

## 2022-01-11 PROCEDURE — 36415 COLL VENOUS BLD VENIPUNCTURE: CPT | Performed by: HOSPITALIST

## 2022-01-11 PROCEDURE — 83605 ASSAY OF LACTIC ACID: CPT | Performed by: HOSPITALIST

## 2022-01-11 PROCEDURE — 93971 EXTREMITY STUDY: CPT | Mod: LT

## 2022-01-11 PROCEDURE — 120N000001 HC R&B MED SURG/OB

## 2022-01-11 RX ORDER — ROPINIROLE 0.25 MG/1
0.25 TABLET, FILM COATED ORAL 3 TIMES DAILY
Status: DISCONTINUED | OUTPATIENT
Start: 2022-01-11 | End: 2022-01-14

## 2022-01-11 RX ORDER — HYDRALAZINE HYDROCHLORIDE 20 MG/ML
10 INJECTION INTRAMUSCULAR; INTRAVENOUS EVERY 6 HOURS PRN
Status: DISCONTINUED | OUTPATIENT
Start: 2022-01-11 | End: 2022-01-14

## 2022-01-11 RX ORDER — CLOTRIMAZOLE 1 %
CREAM (GRAM) TOPICAL 2 TIMES DAILY
Status: COMPLETED | OUTPATIENT
Start: 2022-01-11 | End: 2022-01-16

## 2022-01-11 RX ORDER — IRBESARTAN 150 MG/1
300 TABLET ORAL DAILY
Status: DISCONTINUED | OUTPATIENT
Start: 2022-01-11 | End: 2022-01-14

## 2022-01-11 RX ORDER — DEXTROSE MONOHYDRATE 25 G/50ML
25-50 INJECTION, SOLUTION INTRAVENOUS
Status: DISCONTINUED | OUTPATIENT
Start: 2022-01-11 | End: 2022-01-14

## 2022-01-11 RX ORDER — NICOTINE POLACRILEX 4 MG
15-30 LOZENGE BUCCAL
Status: DISCONTINUED | OUTPATIENT
Start: 2022-01-11 | End: 2022-01-14

## 2022-01-11 RX ORDER — MICONAZOLE NITRATE 20 MG/G
CREAM TOPICAL 2 TIMES DAILY
Status: DISCONTINUED | OUTPATIENT
Start: 2022-01-11 | End: 2022-01-17 | Stop reason: HOSPADM

## 2022-01-11 RX ORDER — CLONIDINE HYDROCHLORIDE 0.1 MG/1
0.1 TABLET ORAL EVERY 6 HOURS PRN
Status: DISCONTINUED | OUTPATIENT
Start: 2022-01-11 | End: 2022-01-14

## 2022-01-11 RX ORDER — HYDROXYZINE HYDROCHLORIDE 25 MG/1
25 TABLET, FILM COATED ORAL 3 TIMES DAILY PRN
Status: DISCONTINUED | OUTPATIENT
Start: 2022-01-11 | End: 2022-01-15

## 2022-01-11 RX ORDER — DONEPEZIL HYDROCHLORIDE 10 MG/1
10 TABLET, FILM COATED ORAL DAILY
Status: DISCONTINUED | OUTPATIENT
Start: 2022-01-11 | End: 2022-01-14

## 2022-01-11 RX ORDER — ATORVASTATIN CALCIUM 20 MG/1
20 TABLET, FILM COATED ORAL EVERY MORNING
Status: DISCONTINUED | OUTPATIENT
Start: 2022-01-11 | End: 2022-01-14

## 2022-01-11 RX ORDER — ROPINIROLE 0.5 MG/1
0.5 TABLET, FILM COATED ORAL AT BEDTIME
Status: DISCONTINUED | OUTPATIENT
Start: 2022-01-11 | End: 2022-01-14

## 2022-01-11 RX ORDER — CITALOPRAM HYDROBROMIDE 20 MG/1
20 TABLET ORAL DAILY
Status: DISCONTINUED | OUTPATIENT
Start: 2022-01-11 | End: 2022-01-14

## 2022-01-11 RX ORDER — FINASTERIDE 5 MG/1
5 TABLET, FILM COATED ORAL DAILY
Status: DISCONTINUED | OUTPATIENT
Start: 2022-01-11 | End: 2022-01-14

## 2022-01-11 RX ORDER — LOPERAMIDE HCL 2 MG
2 CAPSULE ORAL 2 TIMES DAILY
Status: DISCONTINUED | OUTPATIENT
Start: 2022-01-11 | End: 2022-01-14

## 2022-01-11 RX ADMIN — ROPINIROLE HYDROCHLORIDE 0.25 MG: 0.25 TABLET, FILM COATED ORAL at 12:15

## 2022-01-11 RX ADMIN — SODIUM CHLORIDE: 9 INJECTION, SOLUTION INTRAVENOUS at 00:47

## 2022-01-11 RX ADMIN — SODIUM CHLORIDE: 9 INJECTION, SOLUTION INTRAVENOUS at 12:17

## 2022-01-11 RX ADMIN — PIPERACILLIN SODIUM AND TAZOBACTAM SODIUM 4.5 G: 4; .5 INJECTION, POWDER, LYOPHILIZED, FOR SOLUTION INTRAVENOUS at 19:02

## 2022-01-11 RX ADMIN — ROPINIROLE HYDROCHLORIDE 0.25 MG: 0.25 TABLET, FILM COATED ORAL at 19:05

## 2022-01-11 RX ADMIN — ROPINIROLE HYDROCHLORIDE 0.25 MG: 0.25 TABLET, FILM COATED ORAL at 09:09

## 2022-01-11 RX ADMIN — PIPERACILLIN SODIUM AND TAZOBACTAM SODIUM 4.5 G: 4; .5 INJECTION, POWDER, LYOPHILIZED, FOR SOLUTION INTRAVENOUS at 00:46

## 2022-01-11 RX ADMIN — HYDRALAZINE HYDROCHLORIDE 10 MG: 20 INJECTION INTRAMUSCULAR; INTRAVENOUS at 23:33

## 2022-01-11 RX ADMIN — LOPERAMIDE HYDROCHLORIDE 2 MG: 2 CAPSULE ORAL at 12:15

## 2022-01-11 RX ADMIN — ATORVASTATIN CALCIUM 20 MG: 20 TABLET, FILM COATED ORAL at 09:09

## 2022-01-11 RX ADMIN — FINASTERIDE 5 MG: 5 TABLET, FILM COATED ORAL at 09:09

## 2022-01-11 RX ADMIN — DONEPEZIL HYDROCHLORIDE 10 MG: 10 TABLET ORAL at 09:09

## 2022-01-11 RX ADMIN — Medication 125 MCG: at 19:05

## 2022-01-11 RX ADMIN — SODIUM CHLORIDE: 9 INJECTION, SOLUTION INTRAVENOUS at 04:30

## 2022-01-11 RX ADMIN — PIPERACILLIN SODIUM AND TAZOBACTAM SODIUM 4.5 G: 4; .5 INJECTION, POWDER, LYOPHILIZED, FOR SOLUTION INTRAVENOUS at 12:14

## 2022-01-11 RX ADMIN — CITALOPRAM HYDROBROMIDE 20 MG: 20 TABLET ORAL at 09:09

## 2022-01-11 RX ADMIN — ROPINIROLE HYDROCHLORIDE 0.5 MG: 0.5 TABLET, FILM COATED ORAL at 02:16

## 2022-01-11 RX ADMIN — INSULIN ASPART 1 UNITS: 100 INJECTION, SOLUTION INTRAVENOUS; SUBCUTANEOUS at 12:14

## 2022-01-11 RX ADMIN — MICONAZOLE NITRATE: 20 CREAM TOPICAL at 12:16

## 2022-01-11 RX ADMIN — LOPERAMIDE HYDROCHLORIDE 2 MG: 2 CAPSULE ORAL at 19:05

## 2022-01-11 RX ADMIN — IRBESARTAN 300 MG: 150 TABLET ORAL at 09:09

## 2022-01-11 ASSESSMENT — ACTIVITIES OF DAILY LIVING (ADL)
ADLS_ACUITY_SCORE: 14
ADLS_ACUITY_SCORE: 14
ADLS_ACUITY_SCORE: 21
ADLS_ACUITY_SCORE: 14
ADLS_ACUITY_SCORE: 15
ADLS_ACUITY_SCORE: 21
ADLS_ACUITY_SCORE: 14
ADLS_ACUITY_SCORE: 15
ADLS_ACUITY_SCORE: 21
ADLS_ACUITY_SCORE: 17
ADLS_ACUITY_SCORE: 21
ADLS_ACUITY_SCORE: 17
ADLS_ACUITY_SCORE: 14
ADLS_ACUITY_SCORE: 14
ADLS_ACUITY_SCORE: 17
ADLS_ACUITY_SCORE: 14
ADLS_ACUITY_SCORE: 15
ADLS_ACUITY_SCORE: 14
ADLS_ACUITY_SCORE: 21
ADLS_ACUITY_SCORE: 15
ADLS_ACUITY_SCORE: 21

## 2022-01-11 NOTE — PHARMACY-ADMISSION MEDICATION HISTORY
Pharmacy Medication History  Admission medication history interview status for the 1/10/2022  admission is complete. See EPIC admission navigator for prior to admission medications     Location of Interview: Phone  Medication history sources: Patient, Surescripts and Care Everywhere    In the past week, patient estimated taking medication this percent of the time: greater than 90%    Medication reconciliation completed by provider prior to medication history? No    Time spent in this activity: 8 min    Prior to Admission medications    Medication Sig Last Dose Taking? Auth Provider   atorvastatin (LIPITOR) 20 MG tablet Take 20 mg by mouth every morning  1/10/2022 at Unknown time Yes Unknown, Entered By History   Cholecalciferol (VITAMIN D3) 1000 UNIT TABS Take 5,000 Units by mouth every evening  1/10/2022 at Unknown time Yes Unknown, Entered By History   citalopram (CELEXA) 20 MG tablet Take 20 mg by mouth daily  1/10/2022 at Unknown time Yes Unknown, Entered By History   donepezil (ARICEPT) 10 MG tablet Take 10 mg by mouth daily  1/10/2022 at Unknown time Yes Unknown, Entered By History   finasteride (PROSCAR) 5 MG tablet Take 5 mg by mouth daily 1/10/2022 at Unknown time Yes Unknown, Entered By History   glipiZIDE (GLUCOTROL XL) 5 MG 24 hr tablet Take 5 mg by mouth daily 1/10/2022 at Unknown time Yes Unknown, Entered By History   irbesartan (AVAPRO) 300 MG tablet Take 300 mg by mouth daily. 1/10/2022 at Unknown time Yes Unknown, Entered By History   loperamide (IMODIUM) 2 MG capsule Take 2 mg by mouth 2 times daily Past Week at Unknown time Yes Reported, Patient   metFORMIN (GLUCOPHAGE-XR) 500 MG 24 hr tablet Take 1,000 mg by mouth daily 1/10/2022 at Unknown time Yes Unknown, Entered By History   miconazole (MICATIN) 2 % cream Apply topically 2 times daily 1/10/2022 at Unknown time Yes Unknown, Entered By History   nystatin (MYCOSTATIN) 027417 UNIT/GM POWD Apply 1 g topically 2 times daily as needed Past Week at  Unknown time Yes Unknown, Entered By History   rOPINIRole (REQUIP) 0.25 MG tablet Take 0.25 mg by mouth 3 times daily 1/10/2022 at 1200 Yes Unknown, Entered By History   rOPINIRole (REQUIP) 0.5 MG tablet Take 0.5 mg by mouth At Bedtime 1/9/2022 at Unknown time Yes Unknown, Entered By History   hydrOXYzine (ATARAX) 25 MG tablet Take 25 mg by mouth 3 times daily as needed for itching   Unknown, Entered By History   triamcinolone (KENALOG) 0.025 % cream Apply 1 applicator topically 2 times daily   Unknown, Entered By History       The information provided in this note is only as accurate as the sources available at the time of update(s)

## 2022-01-11 NOTE — H&P
[facility]  History and Physical - Hospitalist Service       Date of Admission:  1/10/2022    Assessment & Plan      Chavez Mccain is a 81 year old male with medical history significant for frontotemporal dementia, chronic indwelling Sequeira catheter, CKD, DM 2, HTN, HLD was brought to the ER for evaluation of generalized weakness, confusion, and is being admitted on 1/10/2022 for further.        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  Noted left leg erythema.  Marked leukocytosis, elevated lactic acid, abnormal UA on lab and abnormal chest x-ray as well.  Blood cultures and urine culture sent from ER.  Receiving vancomycin and Zosyn in ER.  Patient also have LLL opacity in prior x-ray as well, no respiratory symptoms reported unclear if this is a chronic finding  -I will continue IV Zosyn for possible UTI and cellulitis  -Follow-up blood and urine cultures  -Continue with IV hydration  -Sequeira catheter was exchanged in ER.  Noted also in the glans penis with some bleeding which stopped after the area was cleaned and Neosporin was applied.  Will consult urology.  -Given left leg redness, will get ultrasound to rule out DVT as well  -PT OT eval,  consult for discharge planning         Acute encephalopathy  Frontotemporal dementia  Generalized weakness  - Continue PTA Requip, donepezil, Celexa when verified  -Patient seems to be confused slightly, oriented to self, knows he is in hospital, knows year.  -He reportedly had left leg weakness and slurred speech during his prior admission with UTI and wife was also concerned about weakness, EMS reported slurred speech but his mouth is extremely dry otherwise no overt deficits noted on exam.  Hold off on further work-up at this time.     Possible left otitis media  -Scanty drainage noted in left ear.  Above  antibiotics would cover if he has otitis media.    CKD stage II: Creatinine at baseline 0.9-1  - Monitor      DM2: A1C 5.8 in July 2021  - PTA he is on metformin and glipizide  -Managed with insulin sliding scale overnight, gradually introduce above medication if he is able to take p.o. adequately       HLD, HTN:   -Resume PTA irbesartan and statin when verified     Diet:   regular  DVT Prophylaxis: Pneumatic Compression Devices  Sequeira Catheter: PRESENT, indication:    Central Lines: None  Code Status:   DNR/DNI    Clinically Significant Risk Factors Present on Admission                # Severe Obesity: last Body mass index is 41.2 kg/m .      Disposition Plan   Expected Discharge:   2-3 days   Anticipated discharge location:  Awaiting care coordination huddle  Delays:              The patient's care was discussed with the Bedside Nurse, Patient and ER Physician.    Oc Samaniego MD  [facility]  Securely message with the Vocera Web Console (learn more here)  Text page via [employer] Paging/Directory        ______________________________________________________________________    Chief Complaint   Weakness     History is obtained from the patient    History of Present Illness   Chavez Mccain is a 81 year old male with medical history significant for frontotemporal dementia, chronic indwelling Sequeira catheter, CKD, DM 2, HTN, HLD was brought to the ER for evaluation of generalized weakness, confusion.     I was unable to obtain history reliably from the patient.  He denies any pain, cough, dyspnea.  I called his wife who reported that patient was sleeping all day yesterday.  At his care facility, the bath aide came this morning but he was resisting to get up.  After bathing, he slept all day today again.  Around 5 PM, she noticed that he was disoriented.  He was weak and could not pull himself up to the bed and could not stand.  His mouth seemed dry and his wife thought his speech was  also slurred.  With above concerns, so he called 911 and patient was brought to the ER by EMS.    In ER, patient was evaluated by Dr. Heredia.  Patient was also noted to have left leg erythema which was not noted during the day per his wife.    Vitals stable.  Lab showed significant leukocytosis, WBC count of 20 K and elevated lactic acid of 3.5 -->2.9.  Procalcitonin was elevated at 3.6.  Chest x-ray showed patchy opacity in the left lung base, suspicious for pneumonia.  UA was grossly abnormal with both WBCs and RBCs, leukocyte esterase.  Urine culture and blood cultures were sent and IV Zosyn and vancomycin was started and an admission was requested.    Review of Systems    The 10 point Review of Systems is negative other than noted in the HPI or here.      Past Medical History    I have reviewed this patient's medical history and updated it with pertinent information if needed.   Past Medical History:   Diagnosis Date     Chronic kidney disease     renal failure     Diabetes mellitus (H)        Past Surgical History   I have reviewed this patient's surgical history and updated it with pertinent information if needed.  Past Surgical History:   Procedure Laterality Date     BACK SURGERY       ENT SURGERY       hemorirroid       KNEE SURGERY       ORTHOPEDIC SURGERY         Social History   I have reviewed this patient's social history and updated it with pertinent information if needed.  Social History     Tobacco Use     Smoking status: Former Smoker     Smokeless tobacco: Never Used   Substance Use Topics     Alcohol use: Yes     Alcohol/week: 2.0 standard drinks     Types: 1 Cans of beer, 1 Glasses of wine per week     Comment: daily     Drug use: No       Family History     Reviewed, not pertinent at this time to his presentation    Prior to Admission Medications   Prior to Admission Medications   Prescriptions Last Dose Informant Patient Reported? Taking?   Cholecalciferol (VITAMIN D3) 1000 UNIT TABS  1/10/2022 at Unknown time Spouse/Significant Other Yes Yes   Sig: Take 5,000 Units by mouth every evening    atorvastatin (LIPITOR) 20 MG tablet 1/10/2022 at Unknown time Spouse/Significant Other Yes Yes   Sig: Take 20 mg by mouth every morning    citalopram (CELEXA) 20 MG tablet 1/10/2022 at Unknown time Spouse/Significant Other Yes Yes   Sig: Take 20 mg by mouth daily    donepezil (ARICEPT) 10 MG tablet 1/10/2022 at Unknown time Spouse/Significant Other Yes Yes   Sig: Take 10 mg by mouth daily    finasteride (PROSCAR) 5 MG tablet 1/10/2022 at Unknown time  Yes Yes   Sig: Take 5 mg by mouth daily   glipiZIDE (GLUCOTROL XL) 5 MG 24 hr tablet 1/10/2022 at Unknown time  Yes Yes   Sig: Take 5 mg by mouth daily   hydrOXYzine (ATARAX) 25 MG tablet   Yes No   Sig: Take 25 mg by mouth 3 times daily as needed for itching   irbesartan (AVAPRO) 300 MG tablet 1/10/2022 at Unknown time Spouse/Significant Other Yes Yes   Sig: Take 300 mg by mouth daily.   loperamide (IMODIUM) 2 MG capsule Past Week at Unknown time Spouse/Significant Other Yes Yes   Sig: Take 2 mg by mouth 2 times daily   metFORMIN (GLUCOPHAGE-XR) 500 MG 24 hr tablet 1/10/2022 at Unknown time  Yes Yes   Sig: Take 1,000 mg by mouth daily   miconazole (MICATIN) 2 % cream 1/10/2022 at Unknown time Spouse/Significant Other Yes Yes   Sig: Apply topically 2 times daily   nystatin (MYCOSTATIN) 983465 UNIT/GM POWD Past Week at Unknown time Spouse/Significant Other Yes Yes   Sig: Apply 1 g topically 2 times daily as needed   rOPINIRole (REQUIP) 0.25 MG tablet 1/10/2022 at 1200  Yes Yes   Sig: Take 0.25 mg by mouth 3 times daily   rOPINIRole (REQUIP) 0.5 MG tablet 1/9/2022 at Unknown time  Yes Yes   Sig: Take 0.5 mg by mouth At Bedtime   triamcinolone (KENALOG) 0.025 % cream   Yes No   Sig: Apply 1 applicator topically 2 times daily      Facility-Administered Medications: None     Allergies   No Known Allergies    Physical Exam   Vital Signs: Temp: 98.8  F (37.1  C) Temp  src: Oral BP: 124/45 Pulse: 86   Resp: 16 SpO2: 96 % O2 Device: None (Room air)    Weight: 240 lbs 0 oz    General: Alert awake, oriented to self, knows he is in hospital, thought itsFebruary 2022, appears comfortable.  Calm and cooperative during exam.  HEENT: PERRLA EOMI. Mucosa very dry.  Scanty drainage from the left ear.  Lungs: Bilateral equal air entry.  Coarse breath sounds over the lung bases, no wheezing, normal work of breathing.   CVS: S1S2 regular, no tachycardia or murmur.   Abdomen: Soft, obese/distended, nontender. BS heard.  : Sequeira catheter in place  MSK: Left leg with mild edema and erythema extending from just above ankle to the below knee.  The area is without a clear border.  Neuro: Alert awake, oriented to self/place/year. Speech is clear. CN 2-12 normal. Strength symmetrical.  Skin: No rash.       Data   Data reviewed today: I reviewed all medications, new labs and imaging results over the last 24 hours. I personally reviewed CXR as above, LLL infiltrate    Recent Labs   Lab 01/10/22  2013 01/10/22  1820   WBC  --  20.9*   HGB  --  11.4*   MCV  --  93   PLT  --  203   INR  --  1.10     --    POTASSIUM 4.2  --    CHLORIDE 106  --    CO2 25  --    BUN 19  --    CR 1.04  --    ANIONGAP 7  --    CLAUDIA 8.5  --    *  --    ALBUMIN 2.8*  --    PROTTOTAL 7.0  --    BILITOTAL 1.1  --    ALKPHOS 107  --    ALT 49  --    AST 31  --      Recent Results (from the past 24 hour(s))   XR Chest Port 1 View    Narrative    EXAM: XR CHEST PORT 1 VIEW  LOCATION: [facility]  DATE/TIME: 1/10/2022 7:12 PM    INDICATION: Fever  COMPARISON: None.      Impression    IMPRESSION: Patchy opacity in the left lung base suspicious for pneumonia. No pleural effusion or pneumothorax. Normal heart size. Tortuous aorta with atherosclerotic calcifications.

## 2022-01-11 NOTE — PLAN OF CARE
Patient arrived on station 88 around 10 am. He was alert but disoriented to place,time and situation. UTI and sepsis work up. Currently on zosyn. Chronic sexton use. Incontinent of  bowel. Moderate carb diet. Up with two assist. Normal saline at 150 ml/hr in to PIV. Urology consulted. Skin red and moist around emma area. Antifungal cream applied. Blood sugar checks with meals.

## 2022-01-11 NOTE — ED NOTES
Answered call light. Patient pulled out the IV in his left arm. Infusion stopped. Compression dressing applied to the left forearm. Bloody blankets replaced. Blood cleaned off handrails of bed.

## 2022-01-11 NOTE — PROGRESS NOTES
[facility]    Medicine Progress Note - Hospitalist Service       Date of Admission:  1/10/2022    Assessment & Plan           Chavez Mccain is a 81 year old male admitted on 1/10/2022.   medical history significant for frontotemporal dementia, chronic indwelling Sequeira catheter, CKD, DM 2, HTN, HLD was brought to the ER for evaluation of generalized weakness, confusion, and is being admitted on 1/10/2022 for further.         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  Noted left leg erythema.  Marked leukocytosis, elevated lactic acid, abnormal UA on lab and abnormal chest x-ray as well.  Patient also have LLL opacity in prior x-ray as well, no respiratory symptoms reported unclear if this is a chronic finding  - continue IV Zosyn for possible UTI and cellulitis  - blood and urine cultures ngtd  - decrease IVF to 100 ml/hr  - clotrimazole cream to glans bid x7 days per Urology  - US negative for DVT  - PT OT eval,  consult for discharge planning; wife reports goal is to return home - had very bad experience at [facility] in recent past        Acute metabolic encephalopathy, resolved  Frontotemporal dementia  Generalized weakness  * He reportedly had left leg weakness and slurred speech during his prior admission with UTI and wife was also concerned about weakness, EMS reported slurred speech but his mouth is extremely dry otherwise no overt deficits noted on exam.  Hold off on further work-up at this time.  - Continue PTA Requip, donepezil, Celexa  - wife reporting mental status is currently at baseline       Possible left otitis media  -Scanty drainage noted in left ear.  Above antibiotics would cover if he has otitis media.     CKD stage II  Creatinine at baseline 0.9-1  - Monitor, stable at admission; will repeat in AM      DM2  A1C 7.7% this  admission  [PTA on metformin and glipizide]  - continue medium ssi for now     HLD, HTN:   - continue PTA irbesartan and statin          Diet: Moderate Consistent Carb (60 g CHO per Meal) Diet    DVT Prophylaxis: Pneumatic Compression Devices  Sequeira Catheter: PRESENT, indication:    Central Lines: None  Code Status: No CPR- Do NOT Intubate      Disposition Plan   Expected Discharge: 01/13/2022     Anticipated discharge location:  Awaiting care coordination huddle  Delays:            The patient's care was discussed with the Bedside Nurse, Patient and Patient's Family.    Salvatore Lopez MD  Hospitalist Service  [facility]  Securely message with the Voc[employer] Web Console (learn more here)  Text page via [employer] Paging/Directory        Clinically Significant Risk Factors Present on Admission                # Diabetes, type II: last A1C 7.7 % (Ref range: 0.0 - 5.6 %)  # Severe Obesity: last Body mass index is 41.2 kg/m .      ______________________________________________________________________    Interval History   He reports feeling well.  Endorses some tenderness to left lower extremity.  No dyspnea or cough.  No fever/chills.  Denies confusion or pain.     Data reviewed today: I reviewed all medications, new labs and imaging results over the last 24 hours. I personally reviewed no images or EKG's today.    Physical Exam   Vital Signs: Temp: 97.8  F (36.6  C) Temp src: Oral BP: 134/60 Pulse: 67   Resp: 18 SpO2: 94 % O2 Device: None (Room air)    Weight: 240 lbs 0 oz  General Appearance: Well nourished male in NAD  Respiratory: lungs CTAB, no wheezes or crackles no tachypnea   Cardiovascular: RRR, normal s1/s2 without murmur  GI: abdomen soft, normal bowel sounds  Skin: mild erythema of left lower extremity, mildly tender to palpation  Other: Alert and appropriate, cranial nerves grossly intact      Data   Recent Labs   Lab 01/11/22  1154 01/11/22  0918 01/11/22  0045 01/10/22  2013 01/10/22  1820    WBC  --   --   --   --  20.9*   HGB  --   --   --   --  11.4*   MCV  --   --   --   --  93   PLT  --   --   --   --  203   INR  --   --   --   --  1.10   NA  --   --   --  138  --    POTASSIUM  --   --   --  4.2  --    CHLORIDE  --   --   --  106  --    CO2  --   --   --  25  --    BUN  --   --   --  19  --    CR  --   --   --  1.04  --    ANIONGAP  --   --   --  7  --    CLAUDIA  --   --   --  8.5  --    * 149* 187* 222*  --    ALBUMIN  --   --   --  2.8*  --    PROTTOTAL  --   --   --  7.0  --    BILITOTAL  --   --   --  1.1  --    ALKPHOS  --   --   --  107  --    ALT  --   --   --  49  --    AST  --   --   --  31  --

## 2022-01-11 NOTE — ED NOTES
[facility]  ED Nurse Handoff Report    ED Chief complaint: Generalized Weakness      ED Diagnosis:   Final diagnoses:   None       Code Status: DNR / DNI    Allergies: No Known Allergies    Patient Story: Pt presents to the ER with c/o weakness. Per report,  pt wasn't feeling well yesterday, today at 1030 am wife felt pt had sl speech and was lethargic. Pt c/o dizziness earlier today-denies it now. Pt denies pain.  Focused Assessment:  Pt A&Ox4, pt sts he wasn't feeling well the last two days, sx of weakness and dizziness. Since this morning his wife  noticed that he has been more weak.  Pt arrived with a catheter in place- changed in the ER.  He feels like he has been having to clear his throat more recently. He denies cough, fevers N/V, and diarrhea.   Results for orders placed or performed during the hospital encounter of 01/10/22   INR     Status: Normal   Result Value Ref Range    INR 1.10 0.85 - 1.15   Partial thromboplastin time     Status: Normal   Result Value Ref Range    aPTT 28 22 - 38 Seconds   CBC with platelets and differential     Status: Abnormal   Result Value Ref Range    WBC Count 20.9 (H) 4.0 - 11.0 10e3/uL    RBC Count 3.81 (L) 4.40 - 5.90 10e6/uL    Hemoglobin 11.4 (L) 13.3 - 17.7 g/dL    Hematocrit 35.3 (L) 40.0 - 53.0 %    MCV 93 78 - 100 fL    MCH 29.9 26.5 - 33.0 pg    MCHC 32.3 31.5 - 36.5 g/dL    RDW 13.7 10.0 - 15.0 %    Platelet Count 203 150 - 450 10e3/uL    % Neutrophils 93 %    % Lymphocytes 2 %    % Monocytes 4 %    % Eosinophils 0 %    % Basophils 0 %    % Immature Granulocytes 1 %    NRBCs per 100 WBC 0 <1 /100    Absolute Neutrophils 19.3 (H) 1.6 - 8.3 10e3/uL    Absolute Lymphocytes 0.4 (L) 0.8 - 5.3 10e3/uL    Absolute Monocytes 0.9 0.0 - 1.3 10e3/uL    Absolute Eosinophils 0.0 0.0 - 0.7 10e3/uL    Absolute Basophils 0.1 0.0 - 0.2 10e3/uL    Absolute Immature Granulocytes 0.2 <=0.4 10e3/uL    Absolute NRBCs 0.0 10e3/uL   iStat Gases (lactate) venous, POCT      Status: Abnormal   Result Value Ref Range    Lactic Acid POCT 3.5 (H) <=2.0 mmol/L    Bicarbonate Venous POCT 27 21 - 28 mmol/L    O2 Sat, Venous POCT 49 (L) 94 - 100 %    pCO2V Venous POCT 43 40 - 50 mm Hg    pH Venous POCT 7.41 7.32 - 7.43    pO2 Venous POCT 26 25 - 47 mm Hg   CBC with platelets differential     Status: Abnormal    Narrative    The following orders were created for panel order CBC with platelets differential.  Procedure                               Abnormality         Status                     ---------                               -----------         ------                     CBC with platelets and d...[[id number]]  Abnormal            Final result                 Please view results for these tests on the individual orders.   Extra Tube     Status: None (In process)    Narrative    The following orders were created for panel order Extra Tube.  Procedure                               Abnormality         Status                     ---------                               -----------         ------                     Extra Red Top Tube[[id number]]                               In process                 Extra Blood Bank Purple ...[[id number]]                      In process                   Please view results for these tests on the individual orders.     Treatments and/or interventions provided: Labs BCx2, catheter change. Monitoring.   Patient's response to treatments and/or interventions:    To be done/followed up on inpatient unit: IV abx, monitoring  Does this patient have any cognitive concerns?: none    Activity level - Baseline/Home:  Independent with walker  Activity Level - Current:   Stand with assist x2    Patient's Preferred language: English   Needed?: No    Isolation: None  Infection: Not Applicable  Patient tested for COVID 19 prior to admission: YES  Bariatric?: No    Vital Signs:   Vitals:    01/10/22 1808 01/10/22 1830   BP: 131/55 124/45   Pulse: 95 88   Resp: 22 8  "  Temp: 98.8  F (37.1  C)    TempSrc: Oral    SpO2: 94%    Weight: 108.9 kg (240 lb)    Height: 1.626 m (5' 4\")        Cardiac Rhythm:Cardiac Rhythm: Normal sinus rhythm    Was the PSS-3 completed:   Yes  What interventions are required if any?               Family Comments: None  OBS brochure/video discussed/provided to patient/family: N/A              Name of person given brochure if not patient:               Relationship to patient:     For the majority of the shift this patient's behavior was Green.   Behavioral interventions performed were .    ED NURSE PHONE NUMBER: [phone]       "

## 2022-01-11 NOTE — CONSULTS
[facility]  Urology Consult Note  Name: Chavez Mccain    MRN: [id number]  YOB: 1940    Age: 81 year old  Date of admission: 1/10/2022  Primary care provider: [facility]     Requesting Physician:  Dr. Samaniego  Reason for consult:  Chronic indwelling Sequeira with recurrent UTI and ulcer of glans penis           History of Present Illness:   Chavez Mccain is a 81 year old male, seen at the request of Dr. Samaniego, who presented to the ED yesterday with generalized weakness and confusion.  PMHx of CKD, DM2, HTN, and HLD.      History of chronic indwelling Sequeira for urinary retention.  Has followed with Dr. Yun (urology) in the past, though has not been seen since October 2020.  Per Dr. 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  Prostate was 170g so SPT was recommended, but patient declined and wished to proceed with urethral Sequeira changed monthly.  Was noted at that time to have history of recurrent UTI with the Sequeira with infections as frequent as every 6 weeks.  He was started on 50 mg daily of macrodantin for prophylaxis.    Mr. Mccain is unable to provide much history when evaluated this afternoon.  He does report he has noticed some white drainage from the penis that he believes has been present for several months.  He does not recall the previous recommendation for suprapubic tube.  Sequeira was changed in the ED, unclear/unknown when Sequeira had last been changed prior to yesterday.  WBC 20.9, Cr 1.04.  UA with >182 RBC and WBC, large leuk est, many bacteria, many yeast, negative nitrite.  UCx is pending.  He is on IV zosyn and vancomycin.            Past Medical History:     Past Medical History:   Diagnosis Date     Chronic kidney disease     renal failure     Diabetes mellitus (H)              Past Surgical History:     Past Surgical History:  "  Procedure Laterality Date     BACK SURGERY       ENT SURGERY       hemorirroid       KNEE SURGERY       ORTHOPEDIC SURGERY                 Social History:     Social History     Tobacco Use     Smoking status: Former Smoker     Smokeless tobacco: Never Used   Substance Use Topics     Alcohol use: Yes     Alcohol/week: 2.0 standard drinks     Types: 1 Cans of beer, 1 Glasses of wine per week     Comment: daily             Family History:   History reviewed. No pertinent family history.          Allergies:   No Known Allergies          Medications:       atorvastatin  20 mg Oral QAM     citalopram  20 mg Oral Daily     donepezil  10 mg Oral Daily     finasteride  5 mg Oral Daily     insulin aspart  1-7 Units Subcutaneous TID AC     insulin aspart  1-5 Units Subcutaneous At Bedtime     irbesartan  300 mg Oral Daily     loperamide  2 mg Oral BID     miconazole   Topical BID     piperacillin-tazobactam  4.5 g Intravenous Q6H     rOPINIRole  0.25 mg Oral TID     rOPINIRole  0.5 mg Oral At Bedtime     sodium chloride (PF)  3 mL Intracatheter Q8H     Vitamin D3  125 mcg Oral QPM             Review of Systems:   A comprehensive greater than 10 system review of systems was carried out.  Pertinent positives and negatives are noted above.  Otherwise negative for contributory info.            Physical Exam:     Blood pressure (!) 169/81, pulse 72, temperature 97.4  F (36.3  C), temperature source Oral, resp. rate 22, height 1.626 m (5' 4\"), weight 108.9 kg (240 lb), SpO2 93 %.    Intake/Output Summary (Last 24 hours) at 1/11/2022 1228  Last data filed at 1/10/2022 2305  Gross per 24 hour   Intake 2600 ml   Output --   Net 2600 ml     Exam:  General - Awake, oriented to self, appears stated age  Pulm - Non-labored breathing with normal respiratory effort  CVS - reg rate and rhythm  Abd - Obese, soft, non-tender, non-distended.  No guarding, rigidity or peritoneal signs.    - Buried penis, uncircumcised, mild erosion of " urethra from chronic sexton, erythema of glans noted when foreskin retracted and scant white creamy discharge noted, sexton in place with concentrated arron urine though no clots or hematuria, foreskin replaced at end of exam  Neuro - CN II-XII grossly intact  Musculoskeletal - mild LE edema  Psych - responsive, alert, cooperative; oriented x3; appropriate mood and affect  External/skin - inspection reveals no rashes, lesions or ulcers aside from  skin findings noted above, normal coloring           Data Reviewed:     Recent Results (from the past 24 hour(s))   XR Chest Port 1 View    Narrative    EXAM: XR CHEST PORT 1 VIEW  LOCATION: [facility]  DATE/TIME: 1/10/2022 7:12 PM    INDICATION: Fever  COMPARISON: None.      Impression    IMPRESSION: Patchy opacity in the left lung base suspicious for pneumonia. No pleural effusion or pneumothorax. Normal heart size. Tortuous aorta with atherosclerotic calcifications.   US Lower Extremity Venous Duplex Left    Narrative    EXAM: US LOWER EXTREMITY VENOUS DUPLEX LEFT  LOCATION: [facility]  DATE/TIME: 1/11/2022 1:06 AM    INDICATION: Leg pain, redness and swelling  COMPARISON: None.  TECHNIQUE: Venous Duplex ultrasound of the left lower extremity with and without compression, augmentation and duplex. Color flow and spectral Doppler with waveform analysis performed.    FINDINGS: Exam includes the common femoral, femoral, popliteal, and contralateral common femoral veins as well as segmentally visualized deep calf veins and greater saphenous vein.     LEFT: Exam is somewhat limited due to patient's sensitivity to compression. Allowing for this, no evidence for DVT in the left lower extremity. No superficial thrombophlebitis. No popliteal cyst.      Impression    IMPRESSION:  1.  No deep venous thrombosis in the left lower extremity.       Recent Labs   Lab 01/10/22  1820   WBC 20.9*   HGB 11.4*   HCT 35.3*   MCV 93         Recent Labs   Lab 01/11/22  1154 01/11/22  0918 01/11/22  0045 01/10/22  2013   NA  --   --   --  138   POTASSIUM  --   --   --  4.2   CHLORIDE  --   --   --  106   CO2  --   --   --  25   ANIONGAP  --   --   --  7   * 149* 187* 222*   BUN  --   --   --  19   CR  --   --   --  1.04   GFRESTIMATED  --   --   --  72   CLAUDIA  --   --   --  8.5     Recent Labs   Lab 01/10/22  1916   COLOR Orange*   APPEARANCE Cloudy*   URINEGLC Negative   URINEBILI Negative   URINEKETONE Negative   SG 1.024   UBLD Large*   URINEPH 5.5   PROTEIN 600 *   NITRITE Negative   LEUKEST Large*   RBCU >182*   WBCU >182*         Assessment and Plan:   Chavez Mccain is a 81 year old male who presented with generalized weakness and confusion.  Concern for possible pneumonia, CAUTI.  Chronic indwelling sexton with past history of recurrent UTI, had previously been on daily suppressive abx (macrodantin 50 mg daily) but does not appear to be on this any longer.   exam concerning for balanitis.    Plan:  1. Admit to hospitalist  2. Sexton exchanged in ED yesterday; recommend continuing with q4 week sexton exchanges; would likely be best served by [facility] long-term, but can be addressed as outpatient once recovered from acute issues  3. Consider resuming daily suppressive abx if UTIs again a recurrent issue  4. Recommend good perineal hygiene at least BID with attention to glans/foreskin region  5. Recommend trial of clotrimazole 1% BID for 7-14 days to glans, pending clinical improvement   6. This plan has been discussed with Dr. Jus Hood, PA-C  [facility]  Phone: [phone]

## 2022-01-11 NOTE — ED PROVIDER NOTES
History   Chief Complaint:  Generalized Weakness       HPI   Chavez Mccain is a 81 year old male with history of DM and CKD who presents with generalized weakness. He reports symptoms started sometime yesterday and he felt generally sick. Today since this morning his wife is noticed that he has been more weak. He has not had any fevers. No vomiting or diarrhea. He feels like he has been having to clear his throat more recently. He denies significant cough. Denies abdominal pain. He denies any chest pain or shortness of breath. EMS reported that wife noted that his speech was slurred and he seemed to be leaning towards his left side.  He required total assist to get out of bed according to EMS. Typically he mobilizes with a walker.    Review of Systems   Constitutional: Positive for activity change, appetite change and fatigue. Negative for diaphoresis and fever.   HENT: Positive for congestion. Negative for sore throat.    Respiratory: Negative for cough and shortness of breath.    Cardiovascular: Negative for chest pain.   Gastrointestinal: Negative for abdominal pain, diarrhea, nausea and vomiting.   Genitourinary: Negative for dysuria.   All other systems reviewed and are negative.    Allergies:  Hydrochlorothiazide W-Triamterene  Triamterene-Hctz    Medications:  atorvastatin (LIPITOR) 20 MG tablet  Cholecalciferol (VITAMIN D3) 1000 UNIT TABS  citalopram (CELEXA) 20 MG tablet  donepezil (ARICEPT) 10 MG tablet  finasteride (PROSCAR) 5 MG tablet  glipiZIDE (GLUCOTROL XL) 5 MG 24 hr tablet  hydrOXYzine (ATARAX) 25 MG tablet  irbesartan (AVAPRO) 300 MG tablet  loperamide (IMODIUM) 2 MG capsule  metFORMIN (GLUCOPHAGE-XR) 500 MG 24 hr tablet  miconazole (MICATIN) 2 % cream  nystatin (MYCOSTATIN) 501657 UNIT/GM POWD  rOPINIRole (REQUIP) 0.25 MG tablet  rOPINIRole (REQUIP) 0.5 MG tablet  triamcinolone (KENALOG) 0.025 % cream  acetaminophen (TYLENOL) 325 MG tablet    nitrofurantoin macrocrystal (MACRODANTIN) 50  MG capsule    NYAMYC 053732 UNIT/GM powder    hydrocortisone valerate (WESTCORT) 0.2 % cream    clotrimazole (LOTRIMIN) 1 % cream     Past Medical History:     Acute cystitis without hematuria  Acute hypokalemia  Ankle and foot joint derangement   Benign localized hyperplasia of prostate without urinary obstruction and other lower urinary tract symptoms (LUTS)   Care Plan: Advanced Directives/Code Status   Complicated UTI (urinary tract infection)  Chronic indwelling Sequeira catheter   Chronic kidney disease (CKD), stage III (moderate)  Cognitive dysfunction   Confusion  Choroidal nevus  Colon polyp  Carpal tunnel syndrome  Chronic suppurative otitis media  Type II or unspecified type diabetes mellitus with neurological manifestations, uncontrolled  Diabetic polyneuropathy    Dementia  Disorders of bursae and tendons in shoulder region, unspecified tears both sides  Elbow pain  Erectile dysfunction of organic origin   Enthesopathy of ankle and tarsus  Frequent falls  Generalized osteoarthrosis  Hyperlipidemia       Hypertension   [place] (hard of hearing)   Impaired mobility and activities of daily living  Indwelling catheter     Weakness  Left leg weakness  Obesity  Other and unspecified sleep apnea   Obstructive uropathy  Other chronic nonalcoholic liver disease  Physical deconditioning   PVD (posterior vitreous detachment)   Pure hypercholesterolemia  Restless legs syndrome  Synovitis and tenosynovitis  Spinal stenosis   Rash     Urinary retention      Vitamin B12 deficiency  Venous stasis syndrome   Venous (peripheral) insufficiency  Urinary retention    Past Surgical History:    Hemorrhoidecty x2   Rep Ing Barry Prterm Infnt  Tymp W/Mastoidec; w/ Recon wall left ear,permanent tube,skin grafyt  Arthplsty Knee Condyle; Med & Lat  Laminect w/Remov Abnl Facets-Lumbar  Knee Surgery  Back Surgery     Family History:    Cataract (mother)     Physical Exam     Patient Vitals for the past 24 hrs:   BP Temp Temp src Pulse Resp  "SpO2 Height Weight   01/10/22 2008 -- -- -- 86 16 96 % -- --   01/10/22 1830 124/45 -- -- 88 8 -- -- --   01/10/22 1808 131/55 98.8  F (37.1  C) Oral 95 22 94 % 1.626 m (5' 4\") 108.9 kg (240 lb)       Physical Exam  Eyes:  The pupils are equal and round    Conjunctivae and sclerae are normal  ENT:    The nose is normal    Pinnae are normal  CV:  Regular rate and rhythm     No edema  Resp:  Lungs are clear    Non-labored    No rales    No wheezing   GI:  Abdomen is soft and non-tender, there is no rigidity    No distension    No rebound tenderness   MS:  Normal muscular tone    No asymmetric leg swelling  Skin:  Erythema of left leg mostly laterally extending above knee, erythema extends to medial lower leg toward middle of lower leg  Neuro:   Awake, alert, GCS 15    Speech is normal and fluent    Face is symmetric    Moves all extremities    Normal finger-nose-finger     strength equal bilaterally    Equal sensation bilaterally    Normal dorsiflexion and plantarflexion bilaterally    Emergency Department Course     Imaging:  XR Chest Port 1 View   Final Result   IMPRESSION: Patchy opacity in the left lung base suspicious for pneumonia. No pleural effusion or pneumothorax. Normal heart size. Tortuous aorta with atherosclerotic calcifications.        Report per radiology    Laboratory:  Labs Ordered and Resulted from Time of ED Arrival to Time of ED Departure   COMPREHENSIVE METABOLIC PANEL - Abnormal       Result Value    Sodium 138      Potassium 4.2      Chloride 106      Carbon Dioxide (CO2) 25      Anion Gap 7      Urea Nitrogen 19      Creatinine 1.04      Calcium 8.5      Glucose 222 (*)     Alkaline Phosphatase 107      AST 31      ALT 49      Protein Total 7.0      Albumin 2.8 (*)     Bilirubin Total 1.1      GFR Estimate 72     PROCALCITONIN - Abnormal    Procalcitonin 3.60 (*)    ROUTINE UA WITH MICROSCOPIC - Abnormal    Color Urine Orange (*)     Appearance Urine Cloudy (*)     Glucose Urine Negative   "    Bilirubin Urine Negative      Ketones Urine Negative      Specific Gravity Urine 1.024      Blood Urine Large (*)     pH Urine 5.5      Protein Albumin Urine 600  (*)     Urobilinogen Urine Normal      Nitrite Urine Negative      Leukocyte Esterase Urine Large (*)     Bacteria Urine Many (*)     Budding Yeast Urine Many (*)     Mucus Urine Present (*)     RBC Urine >182 (*)     WBC Urine >182 (*)    CBC WITH PLATELETS AND DIFFERENTIAL - Abnormal    WBC Count 20.9 (*)     RBC Count 3.81 (*)     Hemoglobin 11.4 (*)     Hematocrit 35.3 (*)     MCV 93      MCH 29.9      MCHC 32.3      RDW 13.7      Platelet Count 203      % Neutrophils 93      % Lymphocytes 2      % Monocytes 4      % Eosinophils 0      % Basophils 0      % Immature Granulocytes 1      NRBCs per 100 WBC 0      Absolute Neutrophils 19.3 (*)     Absolute Lymphocytes 0.4 (*)     Absolute Monocytes 0.9      Absolute Eosinophils 0.0      Absolute Basophils 0.1      Absolute Immature Granulocytes 0.2      Absolute NRBCs 0.0     ISTAT GASES LACTATE VENOUS POCT - Abnormal    Lactic Acid POCT 3.5 (*)     Bicarbonate Venous POCT 27      O2 Sat, Venous POCT 49 (*)     pCO2V Venous POCT 43      pH Venous POCT 7.41      pO2 Venous POCT 26     LACTIC ACID WHOLE BLOOD - Abnormal    Lactic Acid 2.9 (*)    INR - Normal    INR 1.10     PARTIAL THROMBOPLASTIN TIME - Normal    aPTT 28     INFLUENZA A/B & SARS-COV2 PCR MULTIPLEX - Normal    Influenza A PCR Negative      Influenza B PCR Negative      SARS CoV2 PCR Negative     URINE CULTURE   BLOOD CULTURE   BLOOD CULTURE      Emergency Department Course:    Reviewed:  I reviewed nursing notes, vitals, past medical history, Care Everywhere and [facility]    Assessments:  1805 I obtained history and examined the patient as noted above.  1946 I rechecked the patient and explained findings.   1948  I spoke with Maria Dolores, wife of patient, and updated her on patients status.       Consults:  1826 I spoke with Dr. Samaniego, of the  "Hospitalist Service, on the phone.     Interventions:   1828  0.9% sodium chloride BOLUS 1000 mL, IV     Disposition:  The patient will board in the emergency department pending bed placement. Care was signed out to Dr. Samaniego.     Impression & Plan     CMS Diagnoses:   The patient has signs of Severe Sepsis        If one the following conditions is present, a 30 mL/kg bolus is recommended as part of the 6 hour bundle (IBW can be used for BMI >30, or document refusal/contraindication):      1.   Initial hypotension  defined as 2 bps < 90 or map < 65 in the 6hrs before or 6hrs after time zero.     2.  Lactate >4.     The patient has signs of Severe Sepsis as evidenced by:    1. 2 SIRS criteria, AND  2. Suspected infection, AND   3. Organ dysfunction: Lactic Acidosis with value >2.0    Time severe sepsis diagnosis confirmed: 2013  01/10/22 as this was the time when Lactate resulted, and the level was > 2.0    3 Hour Severe Sepsis Bundle Completion:    1. Initial Lactic Acid Result:   Recent Labs   Lab Test 01/10/22  2013 01/10/22  1822 02/26/15  1050   LACT 2.9* 3.5* 3.5*     2. Blood Cultures before Antibiotics: Yes  3. Broad Spectrum Antibiotics Administered:  yes       Anti-infectives (From admission through now)    Start     Dose/Rate Route Frequency Ordered Stop    01/10/22 1900  vancomycin 2500 mg in 0.9% NaCl 500 ml intermittent infusion 2,500 mg         25 mg/kg × 108.9 kg  over 120 Minutes Intravenous ONCE 01/10/22 1856      01/10/22 1855  piperacillin-tazobactam (ZOSYN) 4.5 g vial to attach to  mL bag        Note to Pharmacy: For [facility], [facility] and [facility]: For Zosyn-naive patients, use the \"Zosyn initial dose + extended infusion\" order panel.    4.5 g  over 30 Minutes Intravenous ONCE 01/10/22 1854 01/10/22 1947          4. Fluid volume administered in ED:  Full 30 mL/kg bolus given (see amount below).    BMI Readings from Last 1 Encounters:   01/10/22 41.20 kg/m      30 mL/kg fluids based on weight: 3,270 " mL  30 mL/kg fluids based on IBW (must be >= 60 inches tall): 1,780 mL                Severe Sepsis reassessment:  1. Repeat Lactic Acid Level: 2.9  2. MAP>65 after initial IVF bolus, will continue to monitor fluid status and vital signs      Medical Decision Making:  Chavez Mccain is a 81 year old male who presents to the emergency department with weakness. History/exam seems mostly generalized weakness. Symptoms started 24 hours ago.  There was some reports of him leaning leftward, but no focal weakness detected on exam. He has erythema in his left lower leg which is suggestive for cellulitis. Additionally this area is warmer when compared to the right. He does have a indwelling Sequeira catheter as well. Urinalysis will be obtained after Sequeira catheter change. COVID testing and sepsis work-up initiated. No trauma. He is DNR/DNI. Work-up reveals evidence of UTI and cellulitis of the left lower extremity.  He does have findings consistent with severe sepsis with a lactic acid level of 3.5.  This did improve with IV fluids.  He was given broad-spectrum antibiotics for his infections.  Plan for admission to the hospital for further treatment.  Discussed with the hospitalist agreed to accept him as an admission.      Diagnosis:    ICD-10-CM    1. Severe sepsis (H)  A41.9     R65.20    2. Cellulitis of left lower extremity  L03.116    3. Complicated urinary tract infection  N39.0        Discharge Medications:  New Prescriptions    No medications on file       Scribe Disclosure:  Edwina MOSES, am serving as a scribe at 6:05 PM on 1/10/2022 to document services personally performed by Cassius Moscoso MD based on my observations and the provider's statements to me.          Cassius Moscoso MD  01/11/22 0023

## 2022-01-11 NOTE — PROGRESS NOTES
RECEIVING UNIT ED HANDOFF REVIEW    ED Nurse Handoff Report was reviewed by: Kai Jordan RN on January 11, 2022 at 10:07 AM

## 2022-01-11 NOTE — ED TRIAGE NOTES
Pt presents to the ER with c/o weakness. Per report pt wasn't feeling well yesterday, today at 1030 am wife felt pt had sl speech and was lethargic. Pt c/o dizziness. Pt denies pain. Per EMS BS 26.6

## 2022-01-12 ENCOUNTER — APPOINTMENT (OUTPATIENT)
Dept: OCCUPATIONAL THERAPY | Facility: CLINIC | Age: 82
DRG: 698 | End: 2022-01-12
Attending: HOSPITALIST
Payer: COMMERCIAL

## 2022-01-12 ENCOUNTER — APPOINTMENT (OUTPATIENT)
Dept: PHYSICAL THERAPY | Facility: CLINIC | Age: 82
DRG: 698 | End: 2022-01-12
Attending: HOSPITALIST
Payer: COMMERCIAL

## 2022-01-12 LAB
ANION GAP SERPL CALCULATED.3IONS-SCNC: 6 MMOL/L (ref 3–14)
BACTERIA UR CULT: ABNORMAL
BUN SERPL-MCNC: 14 MG/DL (ref 7–30)
CALCIUM SERPL-MCNC: 8.2 MG/DL (ref 8.5–10.1)
CHLORIDE BLD-SCNC: 110 MMOL/L (ref 94–109)
CO2 SERPL-SCNC: 23 MMOL/L (ref 20–32)
CREAT SERPL-MCNC: 0.97 MG/DL (ref 0.66–1.25)
ERYTHROCYTE [DISTWIDTH] IN BLOOD BY AUTOMATED COUNT: 13.9 % (ref 10–15)
GFR SERPL CREATININE-BSD FRML MDRD: 78 ML/MIN/1.73M2
GLUCOSE BLD-MCNC: 148 MG/DL (ref 70–99)
GLUCOSE BLDC GLUCOMTR-MCNC: 138 MG/DL (ref 70–99)
GLUCOSE BLDC GLUCOMTR-MCNC: 158 MG/DL (ref 70–99)
GLUCOSE BLDC GLUCOMTR-MCNC: 159 MG/DL (ref 70–99)
GLUCOSE BLDC GLUCOMTR-MCNC: 174 MG/DL (ref 70–99)
HCT VFR BLD AUTO: 31.8 % (ref 40–53)
HGB BLD-MCNC: 10.5 G/DL (ref 13.3–17.7)
LACTATE SERPL-SCNC: 0.8 MMOL/L (ref 0.7–2)
MCH RBC QN AUTO: 30 PG (ref 26.5–33)
MCHC RBC AUTO-ENTMCNC: 33 G/DL (ref 31.5–36.5)
MCV RBC AUTO: 91 FL (ref 78–100)
NT-PROBNP SERPL-MCNC: 3187 PG/ML (ref 0–450)
PLATELET # BLD AUTO: 181 10E3/UL (ref 150–450)
POTASSIUM BLD-SCNC: 3.5 MMOL/L (ref 3.4–5.3)
RBC # BLD AUTO: 3.5 10E6/UL (ref 4.4–5.9)
SODIUM SERPL-SCNC: 139 MMOL/L (ref 133–144)
WBC # BLD AUTO: 14 10E3/UL (ref 4–11)

## 2022-01-12 PROCEDURE — 97162 PT EVAL MOD COMPLEX 30 MIN: CPT | Mod: GP

## 2022-01-12 PROCEDURE — 97166 OT EVAL MOD COMPLEX 45 MIN: CPT | Mod: GO | Performed by: OCCUPATIONAL THERAPIST

## 2022-01-12 PROCEDURE — 97535 SELF CARE MNGMENT TRAINING: CPT | Mod: GO | Performed by: OCCUPATIONAL THERAPIST

## 2022-01-12 PROCEDURE — 250N000011 HC RX IP 250 OP 636: Performed by: HOSPITALIST

## 2022-01-12 PROCEDURE — 85027 COMPLETE CBC AUTOMATED: CPT | Performed by: HOSPITALIST

## 2022-01-12 PROCEDURE — 99232 SBSQ HOSP IP/OBS MODERATE 35: CPT | Performed by: HOSPITALIST

## 2022-01-12 PROCEDURE — 82310 ASSAY OF CALCIUM: CPT | Performed by: HOSPITALIST

## 2022-01-12 PROCEDURE — 36415 COLL VENOUS BLD VENIPUNCTURE: CPT | Performed by: HOSPITALIST

## 2022-01-12 PROCEDURE — 258N000003 HC RX IP 258 OP 636: Performed by: HOSPITALIST

## 2022-01-12 PROCEDURE — 250N000013 HC RX MED GY IP 250 OP 250 PS 637: Performed by: PHYSICIAN ASSISTANT

## 2022-01-12 PROCEDURE — 83605 ASSAY OF LACTIC ACID: CPT | Performed by: HOSPITALIST

## 2022-01-12 PROCEDURE — 83880 ASSAY OF NATRIURETIC PEPTIDE: CPT | Performed by: HOSPITALIST

## 2022-01-12 PROCEDURE — 97530 THERAPEUTIC ACTIVITIES: CPT | Mod: GP

## 2022-01-12 PROCEDURE — 250N000013 HC RX MED GY IP 250 OP 250 PS 637: Performed by: HOSPITALIST

## 2022-01-12 PROCEDURE — 120N000001 HC R&B MED SURG/OB

## 2022-01-12 PROCEDURE — 97530 THERAPEUTIC ACTIVITIES: CPT | Mod: GO | Performed by: OCCUPATIONAL THERAPIST

## 2022-01-12 RX ORDER — FUROSEMIDE 10 MG/ML
20 INJECTION INTRAMUSCULAR; INTRAVENOUS ONCE
Status: COMPLETED | OUTPATIENT
Start: 2022-01-12 | End: 2022-01-12

## 2022-01-12 RX ORDER — CEFTRIAXONE 1 G/1
1 INJECTION, POWDER, FOR SOLUTION INTRAMUSCULAR; INTRAVENOUS EVERY 24 HOURS
Status: DISCONTINUED | OUTPATIENT
Start: 2022-01-12 | End: 2022-01-14

## 2022-01-12 RX ORDER — OLANZAPINE 5 MG/1
5 TABLET ORAL 2 TIMES DAILY PRN
Status: DISCONTINUED | OUTPATIENT
Start: 2022-01-12 | End: 2022-01-15

## 2022-01-12 RX ADMIN — ROPINIROLE HYDROCHLORIDE 0.25 MG: 0.25 TABLET, FILM COATED ORAL at 09:00

## 2022-01-12 RX ADMIN — FUROSEMIDE 20 MG: 10 INJECTION, SOLUTION INTRAVENOUS at 16:56

## 2022-01-12 RX ADMIN — CLOTRIMAZOLE: 1 CREAM TOPICAL at 20:58

## 2022-01-12 RX ADMIN — IRBESARTAN 300 MG: 150 TABLET ORAL at 09:00

## 2022-01-12 RX ADMIN — INSULIN ASPART 1 UNITS: 100 INJECTION, SOLUTION INTRAVENOUS; SUBCUTANEOUS at 12:11

## 2022-01-12 RX ADMIN — INSULIN ASPART 1 UNITS: 100 INJECTION, SOLUTION INTRAVENOUS; SUBCUTANEOUS at 18:20

## 2022-01-12 RX ADMIN — ROPINIROLE HYDROCHLORIDE 0.25 MG: 0.25 TABLET, FILM COATED ORAL at 12:11

## 2022-01-12 RX ADMIN — ATORVASTATIN CALCIUM 20 MG: 20 TABLET, FILM COATED ORAL at 09:00

## 2022-01-12 RX ADMIN — DONEPEZIL HYDROCHLORIDE 10 MG: 10 TABLET ORAL at 09:00

## 2022-01-12 RX ADMIN — MICONAZOLE NITRATE: 20 CREAM TOPICAL at 20:58

## 2022-01-12 RX ADMIN — CLOTRIMAZOLE: 1 CREAM TOPICAL at 09:08

## 2022-01-12 RX ADMIN — CEFTRIAXONE SODIUM 1 G: 1 INJECTION, POWDER, FOR SOLUTION INTRAMUSCULAR; INTRAVENOUS at 09:00

## 2022-01-12 RX ADMIN — ROPINIROLE HYDROCHLORIDE 0.25 MG: 0.25 TABLET, FILM COATED ORAL at 20:53

## 2022-01-12 RX ADMIN — Medication 125 MCG: at 20:53

## 2022-01-12 RX ADMIN — PIPERACILLIN SODIUM AND TAZOBACTAM SODIUM 4.5 G: 4; .5 INJECTION, POWDER, LYOPHILIZED, FOR SOLUTION INTRAVENOUS at 02:00

## 2022-01-12 RX ADMIN — CITALOPRAM HYDROBROMIDE 20 MG: 20 TABLET ORAL at 09:00

## 2022-01-12 RX ADMIN — LOPERAMIDE HYDROCHLORIDE 2 MG: 2 CAPSULE ORAL at 20:57

## 2022-01-12 RX ADMIN — MICONAZOLE NITRATE: 20 CREAM TOPICAL at 09:08

## 2022-01-12 RX ADMIN — SODIUM CHLORIDE: 9 INJECTION, SOLUTION INTRAVENOUS at 02:07

## 2022-01-12 RX ADMIN — CLOTRIMAZOLE: 1 CREAM TOPICAL at 03:37

## 2022-01-12 RX ADMIN — FINASTERIDE 5 MG: 5 TABLET, FILM COATED ORAL at 09:00

## 2022-01-12 RX ADMIN — LOPERAMIDE HYDROCHLORIDE 2 MG: 2 CAPSULE ORAL at 09:00

## 2022-01-12 ASSESSMENT — ACTIVITIES OF DAILY LIVING (ADL)
ADLS_ACUITY_SCORE: 21
ADLS_ACUITY_SCORE: 21
ADLS_ACUITY_SCORE: 24
ADLS_ACUITY_SCORE: 21
ADLS_ACUITY_SCORE: 25
ADLS_ACUITY_SCORE: 25
ADLS_ACUITY_SCORE: 21
ADLS_ACUITY_SCORE: 25
ADLS_ACUITY_SCORE: 21
ADLS_ACUITY_SCORE: 21
ADLS_ACUITY_SCORE: 24
ADLS_ACUITY_SCORE: 25
ADLS_ACUITY_SCORE: 24
ADLS_ACUITY_SCORE: 21
ADLS_ACUITY_SCORE: 21
ADLS_ACUITY_SCORE: 25
ADLS_ACUITY_SCORE: 24
ADLS_ACUITY_SCORE: 21

## 2022-01-12 NOTE — PROVIDER NOTIFICATION
MD Notification    Notified Person: MD    Notified Person Name: Thomas Chau    Notification Date/Time: 1/12/22 @0534    Notification Interaction:    Patient is restless and agitated. No PRN meds available for agitation and restless. Any new orders? thanks!    Purpose of Notification: [employer]    Orders Received:    Comments:
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
## 2022-01-12 NOTE — PROGRESS NOTES
"[facility]    Urology Progress Note     Assessment & Plan   Chavez Mccain is a 81 year old male with chronic sexton, CAUTI, possible balanitis    Plan:   - Keep 22F sexton in place, no need for CBI given clear outputs. OK to keep this cathter in place until due for next change. Larger size will allow for better drainage of sediment.   - Continue to hand irrigate q shift and PRN to ensure patency of sexton given sediment in urine. Urology hand irrigated at bedside for return of clear urine.   - Patient needs a new Urologist due to prior urologist retiring - I have discussed this with patient's wife and will add [facility] information to [facility] for follow up.   - Continue clotrimazole cream to glans x7-14 days and good perineal hygiene cares as outlined in Katherine Hood's 1/11 note.     Interval History   Sexton apparently clogged with sediment yesterday, not draining. Per patient's wife, he complains of \"pain in his rectum\" when his cathter plugs up and his bladder is full. Patient's wife reports he typically uses an 18F catheter at home and occasionally requires irrigations to keep patent. Sexton was upsized overnight from 16F to 22F 3-way for return of \"bloody\" urine. Hand irrigated but not connected to CBI. Upon my visit, patient denies bladder or rectum pain. Sexton was draining clear urine. Hand irrigated at bedside to ensure patency. Per wife, suprapubic catheter has been discussed in the past but was decided this is not a good option for the patient given his body habitus.     Jania Morales PA-C  [facility]  Pager: [phone]  Office: [phone]    OBJECTIVE:          BP (!) 151/77 (BP Location: Left arm)   Pulse 83   Temp 97.4  F (36.3  C) (Oral)   Resp 18   Ht 1.626 m (5' 4\")   Wt 108.9 kg (240 lb)   SpO2 93%   BMI 41.20 kg/m      Intake/Output Summary (Last 24 hours) at 1/12/2022 1408  Last data filed at 1/12/2022 0920  Gross per 24 hour   Intake 750 ml "   Output 3090 ml   Net -2340 ml            General appearance shows no deformaties and good grooming in no acute distress.  EYES: no icterus  HEAD, EARS, NOSE, MOUTH, AND THROAT: atraumatic, normocephalic  NECK: symmetric  CHEST WALL: symmetric  CARDIAC: skin well perfused  RESPIRATORY: breathing unlabored  ABDOMEN: soft, non tender, non distended, obese  : 22F 3-way sexton in place draining yellow urine. Hand irrigated for return of yellow urine. Buried penis with mild urethral erosion and erythema of the glans.   SKIN/HAIR/NAILS: no visible rashes aside from noted  findings  PSYCHIATRIC: awake, oriented to self, hard of hearing    LABS:         Lab Results   Component Value Date    WBC 14.0 01/12/2022    WBC 7.0 06/15/2019     Lab Results   Component Value Date    RBC 3.50 01/12/2022    RBC 3.83 06/15/2019     Lab Results   Component Value Date    HGB 10.5 01/12/2022    HGB 11.8 06/15/2019     Creatinine   Date Value Ref Range Status   01/12/2022 0.97 0.66 - 1.25 mg/dL Final   06/15/2019 0.95 0.66 - 1.25 mg/dL Final   ]  Lab Results   Component Value Date    BUN 14 01/12/2022    BUN 22 06/15/2019       Jania Morales PA-C  [facility]

## 2022-01-12 NOTE — PLAN OF CARE
Patient remains stable with vitals noted to be with base line hypertension (157/73). Will monitor and correct per PRN order. He remains disoriented to place time and situation. Complained of not getting enough sleep. He dosed off in between cares. His wife wants staff to remember and apply CPAP at bed time. Patient and his wife wondered about discharge. MD aware. Will monitor patient to see his improvement before deciding discharge plans. Antibiotics changed to rocephin.  Regular diet. Good appetite. Blood sugar monitored before meals and correction given. IV fluids stopped. Sequeira working appropriately, no clots or clogging.PT/OT consult this afternoon.

## 2022-01-12 NOTE — PLAN OF CARE
"Shift Note: 01/11/2022 1739-2450  A/O x2-3. Up x2 w/ GB, walker. Tolerating diabetic diet. BGM AC/HS. Incont bowel, no BM this shift. Takes scheduled imodium (baseline per pt). Chronic Sexton in place. Noted low UOP in bag, some sediment (urine and blood) in tubing near penis. Bladder scanned: 301mL. Paged MD regarding manual flush orders. Hospitalist advised paging on-call urologist due to extensive urology hx. On call urologist (Dr. Parmjit Luu) ordered PRN manual sexton irrigation. Order entered under \"Patient Care Order\"  LLE reddened, edematous, tender to touch. Per MD, to continue abx regimen. PIV infusing /hr, intermittent zosyn piggybacked. Takes meds whole with water, multiple at a time.  Cultures pending, discharge likely once cultures back and PO abx determined.  "

## 2022-01-12 NOTE — PROGRESS NOTES
[facility]    Medicine Progress Note - Hospitalist Service       Date of Admission:  1/10/2022    Assessment & Plan           Chavez Mccain is a 81 year old male admitted on 1/10/2022.   medical history significant for frontotemporal dementia, chronic indwelling Sequeira catheter, CKD, DM 2, HTN, HLD was brought to the ER for evaluation of generalized weakness, confusion, and is being admitted on 1/10/2022 for further.         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  Noted left leg erythema.  Marked leukocytosis, elevated lactic acid.  Abnormal UA but culture growing Candida only.  Abnormal chest x-ray, but LLL opacity on prior x-ray and has no respiratory symptoms to suggest PNA.     * LE US negative for DVT  - transition from zosyn to ceftriaxone 1/12 to cover cellulitis and possible PNA  - blood cultures ngtd  - stop IVF  - leukocytosis improved to 14; monitor  - clotrimazole cream to glans bid x7 days per Urology  - PT OT eval,  consult for discharge planning; wife reports goal is to return home - had very bad experience at TCU in recent past  - trouble with urinary retention due to clotting overnight, improved with irrgation  - Urology recs appreciated  - reporting some dyspnea today - will add on BNP as may need some lasix following IVF; also slept without CPAP overnight - denies cough or fever/chills        Acute metabolic encephalopathy, resolving  Frontotemporal dementia  Generalized weakness  * He reportedly had left leg weakness and slurred speech during his prior admission with UTI and wife was also concerned about weakness, EMS reported slurred speech but his mouth is extremely dry otherwise no overt deficits noted on exam.  Hold off on further work-up at this time.  - Continue PTA Requip, donepezil, Celexa  - confused overnight and  today; oriented to self and hospital only (thought it was [facility])       Possible left otitis media  -Scanty drainage noted in left ear.  Above antibiotics would cover if he has otitis media.     CKD stage II  Creatinine at baseline 0.9-1  - Monitor, stable 1/12     DM2  A1C 7.7% this admission  [PTA on metformin and glipizide]  - continue medium ssi for now     HLD, HTN:   - continue PTA irbesartan and statin     TEETEE  - continue CPAP with home settings         Diet: Moderate Consistent Carb (60 g CHO per Meal) Diet    DVT Prophylaxis: Pneumatic Compression Devices  Sequeira Catheter: PRESENT, indication: Other (Comment) (chronic Sequeira)  Central Lines: None  Code Status: No CPR- Do NOT Intubate      Disposition Plan   Expected Discharge: 01/13/2022     Anticipated discharge location:  Awaiting care coordination huddle  Delays:            The patient's care was discussed with the Bedside Nurse, Patient and Patient's Family.    aSlvatore Lopez MD  Hospitalist Service  [facility]  Securely message with the Vocera Web Console (learn more here)  Text page via [employer] Paging/Directory        Clinically Significant Risk Factors Present on Admission              # Diabetes, type II: last A1C 7.7 % (Ref range: 0.0 - 5.6 %)  # Severe Obesity: last Body mass index is 41.2 kg/m .      ______________________________________________________________________    Interval History   He reports feeling much better today with less abdominal pain.  Denies any shortness of breath but wife reports he appears more labored.  Denies cough or fever/chills.  Less leg tenderness today.  Wife also notes increased confusion.     Data reviewed today: I reviewed all medications, new labs and imaging results over the last 24 hours. I personally reviewed no images or EKG's today.    Physical Exam   Vital Signs: Temp: 97.4  F (36.3  C) Temp src: Oral BP: (!) 151/77 Pulse: 83   Resp: 18 SpO2: 93 % O2 Device: None (Room air)     Weight: 240 lbs 0 oz     General Appearance: Well nourished male in NAD  Respiratory: lungs CTAB, no wheezes or crackles no tachypnea   Cardiovascular: RRR, normal s1/s2 without murmur  GI: abdomen soft, normal bowel sounds  Skin: LLE erythema improved, 1+ pitting LE edema bilaterally  Other: Alert, oriented to self and hospital only, cranial nerves grossly intact      Data   Recent Labs   Lab 01/12/22  1138 01/12/22  0728 01/12/22  0630 01/11/22  0045 01/10/22  2013 01/10/22  1820   0000   WBC  --   --  14.0*  --   --  20.9*  --    HGB  --   --  10.5*  --   --  11.4*  --    MCV  --   --  91  --   --  93  --    PLT  --   --  181  --   --  203  --    INR  --   --   --   --   --  1.10  --    NA  --   --  139  --  138  --   --    POTASSIUM  --   --  3.5  --  4.2  --   --    CHLORIDE  --   --  110*  --  106  --   --    CO2  --   --  23  --  25  --   --    BUN  --   --  14  --  19  --   --    CR  --   --  0.97  --  1.04  --   --    ANIONGAP  --   --  6  --  7  --   --    CLAUDIA  --   --  8.2*  --  8.5  --   --    * 138* 148*   < > 222*  --    < >   ALBUMIN  --   --   --   --  2.8*  --   --    PROTTOTAL  --   --   --   --  7.0  --   --    BILITOTAL  --   --   --   --  1.1  --   --    ALKPHOS  --   --   --   --  107  --   --    ALT  --   --   --   --  49  --   --    AST  --   --   --   --  31  --   --     < > = values in this interval not displayed.

## 2022-01-12 NOTE — PROGRESS NOTES
01/12/22 1403   Quick Adds   Type of Visit Initial Occupational Therapy Evaluation   Living Environment   People in home spouse   Current Living Arrangements house   Home Accessibility no concerns;wheelchair accessible   Transportation Anticipated family or friend will provide   Living Environment Comments Pt lives in accessible rambler, walk-in shower with shower chair.   Self-Care   Usual Activity Tolerance fair   Current Activity Tolerance poor   Regular Exercise Yes   Activity/Exercise Type   (PT exercises 3x/week)   Equipment Currently Used at Home grab bar, toilet;grab bar, tub/shower;raised toilet seat;shower chair;walker, rolling  (transport chair, bed rails)   Activity/Exercise/Self-Care Comment Pt's wife reports assisting pt with all I/ADLs- RN comes 1x per month for managing catheter, bathing aide comes 2x/week. Pt's wife reports pt normally able to get out of bed independently and walk with a FWW household distance, but is mostly sedentary   Disability/Function   Hearing Difficulty or Deaf yes   Patient's preferred means of communication English speaker with hearing loss, no speech problems.;verbal   Describe hearing loss hearing loss on right side;hearing loss on left side;bilateral hearing loss   Use of hearing assistive devices none   Concentrating, Remembering or Making Decisions Difficulty yes   Concentration Management   (baseline frontotemporal dementia)   Walking or Climbing Stairs Difficulty yes   Walking or Climbing Stairs ambulation difficulty, requires equipment   Dressing/Bathing Difficulty yes   Dressing/Bathing bathing difficulty, requires equipment;bathing difficulty, assistance 1 person;dressing difficulty, assistance 1 person   Toileting issues yes   Toileting Management Chronic catheter   Toileting Assistance toileting difficulty, assistance 1 person  (fecal incontinence)   Doing Errands Independently Difficulty (such as shopping) yes   Errands Management does not drive   Fall  history within last six months no   General Information   Onset of Illness/Injury or Date of Surgery 01/10/22   Referring Physician Salvatore Lopez MD   Additional Occupational Profile Info/Pertinent History of Current Problem Chavez Mccain is a 81 year old male admitted on 1/10/2022.   medical history significant for frontotemporal dementia, chronic indwelling Sequeira catheter, CKD, DM 2, HTN, HLD was brought to the ER for evaluation of generalized weakness, confusion, L LE erythema   Existing Precautions/Restrictions fall   Limitations/Impairments safety/cognitive   Cognitive Status Examination   Orientation Status person;place   Affect/Mental Status (Cognitive) low arousal/lethargic   Follows Commands follows one-step commands;50-74% accuracy;repetition of directions required;increased processing time needed;delayed response/completion   Safety Deficit insight into deficits/self-awareness;moderate deficit   Visual Perception   Visual Impairment/Limitations corrective lenses full-time   Sensory   Sensory Quick Adds No deficits were identified   Pain Assessment   Patient Currently in Pain No   Range of Motion Comprehensive   General Range of Motion bilateral upper extremity ROM WFL   Strength Comprehensive (MMT)   Comment, General Manual Muscle Testing (MMT) Assessment Generalized weakness, B UE 4/5   Muscle Tone Assessment   Muscle Tone Quick Adds No deficits were identified   Coordination   Upper Extremity Coordination Left UE impaired;Right UE impaired   Coordination Comments Mild tremor bilaterally, difficulty with fine motor coordination   Bed Mobility   Bed Mobility supine-sit   Supine-Sit [place] (Bed Mobility) moderate assist (50% patient effort);verbal cues  (2nd person for safety)   Assistive Device (Bed Mobility) bed rails   Transfers   Transfers sit-stand transfer   Sit-Stand Transfer   Sit-Stand [place] (Transfers) moderate assist (50% patient effort);verbal cues   Assistive Device  (Sit-Stand Transfers) walker, front-wheeled   Sit/Stand Transfer Comments 2nd person for safety   Balance   Balance Comments Pt with impaired sitting and standing balance, requiring UE support and A of 1-2. Pt ambulated ~ 3 feet to chair with min-mod A of 2, high falls risk at this time   Activities of Daily Living   BADL Assessment lower body dressing;toileting;grooming   Lower Body Dressing Assessment   [place] Level (Lower Body Dressing) maximum assist (25% patient effort)   Grooming Assessment   [place] Level (Grooming) set up;verbal cues   Position (Grooming) supported sitting   Toileting   [place] Level (Toileting) maximum assist (25% patient effort)   Assistive Devices (Toileting) commode chair   Clinical Impression   Criteria for Skilled Therapeutic Interventions Met (OT) yes;meets criteria;skilled treatment is necessary   OT Diagnosis Impaired independence with functional mobility and I/ADLs   OT Problem List-Impairments impacting ADL activity tolerance impaired;balance;cognition;mobility;coordination;strength   Assessment of Occupational Performance 1-3 Performance Deficits   Identified Performance Deficits functional mobility, toileting   (gets assistance with all ADLs)   Planned Therapy Interventions (OT) ADL retraining;bed mobility training;transfer training;progressive activity/exercise   Clinical Decision Making Complexity (OT) moderate complexity   Therapy Frequency (OT) 5x/week   Predicted Duration of Therapy 1 week   Anticipated Equipment Needs Upon Discharge (OT)   (Has necessary adaptive equipment)   Risk & Benefits of therapy have been explained evaluation/treatment results reviewed;care plan/treatment goals reviewed;risks/benefits reviewed;current/potential barriers reviewed;participants voiced agreement with care plan;participants included;patient;spouse/significant other   OT Discharge Planning    OT Discharge Recommendation (DC Rec) Transitional Care Facility;Home with  assist;home with home care occupational therapy   OT Rationale for DC Rec Patient is below baseline with functional mobility, currently requiring A of 1-2 for all mobility. Pt's wife assists with all I/ADLs however anticipate patient will need more physical assistance than wife able to provide. Recommend TCU to progress activity tolerance and maximize independence. If pt were to return home, would require 24 hr assist with mobility and I/ADLs.   OT Brief overview of current status  Mod A supine>sit, mod A sit>stand, min-mod A of 2 transfer to chair   Total Evaluation Time (Minutes)   Total Evaluation Time (Minutes) 10

## 2022-01-12 NOTE — CONSULTS
Care Management Initial Consult    General Information  Assessment completed with: Spouse or significant other, Maria Dolores  Type of CM/SW Visit: CM Role Introduction    Primary Care Provider verified and updated as needed: Yes   Readmission within the last 30 days: no previous admission in last 30 days      Reason for Consult: discharge planning  Advance Care Planning:            Communication Assessment  Patient's communication style: spoken language (English or Bilingual)    Hearing Difficulty or Deaf: yes   Wear Glasses or Blind: yes    Cognitive  Cognitive/Neuro/Behavioral: .WDL except,orientation  Level of Consciousness: confused  Arousal Level: opens eyes spontaneously  Orientation: disoriented to,place,time,situation  Mood/Behavior: cooperative,anxious  Best Language: 0 - No aphasia  Speech: clear,spontaneous    Living Environment:   People in home: spouse  Maria Dolores  Current living Arrangements: house      Able to return to prior arrangements: other (see comments) (PT/OT recc A2 vs TCU)       Family/Social Support:  Care provided by: spouse/significant other  Provides care for: no one, unable/limited ability to care for self  Marital Status:   Wife,Children (children are in [facility])  Maria Dolores       Description of Support System: Supportive,Involved    Support Assessment: Adequate family and caregiver support,Adequate social supports    Current Resources:   Patient receiving home care services: No     Community Resources:    Equipment currently used at home: grab bar, toilet,grab bar, tub/shower,raised toilet seat,shower chair,walker, rolling (transport chair, bed rails, lift chair)  Supplies currently used at home:      Employment/Financial:  Employment Status: retired        Financial Concerns: No concerns identified           Lifestyle & Psychosocial Needs:  Social Determinants of Health     Tobacco Use: Medium Risk     Smoking Tobacco Use: Former Smoker     Smokeless Tobacco Use: Never Used    Alcohol Use: Not on file   Financial Resource Strain: Not on file   Food Insecurity: Not on file   Transportation Needs: Not on file   Physical Activity: Not on file   Stress: Not on file   Social Connections: Not on file   Intimate Partner Violence: Not on file   Depression: Not on file   Housing Stability: Not on file       Functional Status:  Prior to admission patient needed assistance: per spouse at baseline she assists with all IADL's however at baseline the patient ambulates with a walker.                Additional Information:  Writer met with the patient and spouse at the bedside. Patient was sleepy so primary conversation was with the spouse Maria Dolores at bedside. Introduced role and scope in safe discharge planning.  Per Spouse Maria Dolores, at baseline patient ambulates with a walker she does provide assist with all ADL's.  She informs this writer that OT just came in an recommended A2 people vs TCU.  Per Maria Dolores their kids live out of town and have families of their own.  Maria Dolores stated that the patient was in a TCU in July [facility] and she took him out due to the conditions of care.  She has also had him in Respite care at [facility], however she would like the patient to go to TCU for strengthening if supported and covered by insurance.  Per Maria Dolores she is aware that discharge could be as soon as tomorrow pending how he has done overnight and labs.  Maria Dolores plans on being here tomorrow and would like an update. Patient has been vaccinated for COVID 19 and boosted. Writer went over [employer] and that it requires prior authorization so once PT/OT have made recommendations and if they support TCU we would work on a referral and update her when known.  Maria Dolores was agreeable to have referrals go to the following if TCU recommended  1. [facility]  2. [facility] would like to transport the patient if able, writer suggested that we see how patient does  with PT and overall to determine safety.      Updated SW regarding the above as full consults from PT/OT are not in the system.     Carmen Greco RN, BSN, ACM   Care Transitions Specialist   [facility]  Care Transitions Specialist   [address] [place]. [address]  [email or web address]  Office:[phone] Fax: [phone]  [facility]          Carmen Greco RN

## 2022-01-12 NOTE — PROGRESS NOTES
01/12/22 1500   Quick Adds   Type of Visit Initial PT Evaluation       Present no   Living Environment   People in home spouse   Current Living Arrangements house   Home Accessibility no concerns   Transportation Anticipated family or friend will provide   Living Environment Comments Patient lives with maverick trotter his wife, Maria Dolores.  Patient has three adult children that live out of town (one in [place], two in [place]).     Self-Care   Usual Activity Tolerance fair   Current Activity Tolerance poor   Regular Exercise Yes   Activity/Exercise Type   (Neighbor assists with PT exercises several times per week)   Equipment Currently Used at Home grab bar, toilet;grab bar, tub/shower;raised toilet seat;shower chair;walker, rolling   Activity/Exercise/Self-Care Comment Patient's wife assists with determining prior level of function as patient with frontotemporal dementia.  Patient assists with dressing and toileting tasks.  Patient has a chronic urinary catheter and has a RN come once per month for management.  Patient has a bathing aide assist with showers 2x/week.  Patient's wife reports patient is independent with bed mobility and ambulates in the home with a FWW.     Disability/Function   Hearing Difficulty or Deaf yes   Wear Glasses or Blind yes   Vision Management Glasses   Concentrating, Remembering or Making Decisions Difficulty yes   Concentration Management Baseline frontotemporal dementia   Walking or Climbing Stairs Difficulty yes   Walking or Climbing Stairs ambulation difficulty, requires equipment   Mobility Management FWW   Dressing/Bathing Difficulty yes   Dressing/Bathing bathing difficulty, requires equipment;bathing difficulty, assistance 1 person;dressing difficulty, assistance 1 person   Toileting issues yes   Toileting Management Chronic catheter, incontinent of bowel   Toileting Assistance toileting difficulty, assistance 1 person   Doing Errands Independently Difficulty  (such as shopping) yes   Errands Management Family assists with transportation   Fall history within last six months no   General Information   Onset of Illness/Injury or Date of Surgery 01/10/22   Referring Physician Salvatore Lopez MD   Patient/Family Therapy Goals Statement (PT) Patient's wife would like for patient to discharge home because he's had negative experiences at TCU in the past.   Pertinent History of Current Problem (include personal factors and/or comorbidities that impact the POC) 81 year old male admitted on 1/10/2022 with medical history significant for frontotemporal dementia, chronic indwelling Sequeira catheter, CKD, DM 2, HTN, HLD was brought to the ER for evaluation of generalized weakness and confusion.   Existing Precautions/Restrictions fall   Cognition   Orientation Status (Cognition) oriented to;person;place;verbal cues/prompts needed for orientation   Affect/Mental Status (Cognition) WFL;confused   Follows Commands (Cognition) follows one-step commands;75-90% accuracy;delayed response/completion;increased processing time needed;repetition of directions required   Pain Assessment   Patient Currently in Pain Yes, see Vital Sign flowsheet  (Chronic back pain)   Integumentary/Edema   Integumentary/Edema Comments Age-related skin changes, bilateral lower extremity edema noted in feet   Posture    Posture Forward head position;Protracted shoulders   Range of Motion (ROM)   ROM Quick Adds ROM WFL   Strength   Manual Muscle Testing Quick Adds Able to perform R SLR;Able to perform L SLR;Deficits observed during functional mobility   Strength Comments Generalized lower extremity weakness, grossly 4/5   Bed Mobility   Comment (Bed Mobility) Patient requires minAx1 at bilateral lower extremities for sit>supine with HOB flat and upper extremity support on bed rail.  Requires maxAx2 for repositioning in bed, patient does assist boost with bilateral lower extremities.   Transfers   Transfer Safety  Comments Patient performs sit<> stand transfer from recliner chair with FWW and modAx2.  Patient requires cues for safe hand placement.  Patient cued for upright head and trunk posture once stand achieved with upper extremity support on FWW.   Balance   Balance Comments Impaired dynamic balance   Sensory Examination   Sensory Perception patient reports no sensory changes   Clinical Impression   Criteria for Skilled Therapeutic Intervention yes, treatment indicated   PT Diagnosis (PT) Impaired functional mobility   Influenced by the following impairments Generalized lower extremity weakness, decreased activity tolerance, impaired dynamic balance, cognitive status   Functional limitations due to impairments Impaired independence with bed mobility, transfers, and gait   Clinical Presentation Evolving/Changing   Clinical Presentation Rationale Clinical judgement, PMH, social support   Clinical Decision Making (Complexity) moderate complexity   Therapy Frequency (PT) Daily   Predicted Duration of Therapy Intervention (days/wks) 5 days   Planned Therapy Interventions (PT) balance training;bed mobility training;gait training;home exercise program;patient/family education;strengthening;transfer training;progressive activity/exercise   Risk & Benefits of therapy have been explained evaluation/treatment results reviewed;care plan/treatment goals reviewed;risks/benefits reviewed;current/potential barriers reviewed;participants voiced agreement with care plan;participants included;patient;spouse/significant other   PT Discharge Planning    PT Discharge Recommendation (DC Rec) Transitional Care Facility;home with assist;home with home care physical therapy   PT Rationale for DC Rec Patient currently below prior IND/mod I mobility baseline.  On evaluation, patient requiring Ax1-2 for sit<>stand transfer from recliner and stand-pivot transfer between recliner chair and bed with FWW.  Patient lives with his wife, who assists with  I/ADLs at baseline.  Given need for Ax2, recommend continued IP PT and TCU at discharge to progress safety and independence with functional mobility tasks.  If patient were to discharge home, recommend 24/7 assist x 1-2.  Patient owns all recommended equipment.      Total Evaluation Time   Total Evaluation Time (Minutes) 10

## 2022-01-12 NOTE — PROVIDER NOTIFICATION
MD Notification    Notified Person: MD    Notified Person Name: Jus([facility])    Notification Date/Time: 1/12/22 @ 0000    Notification Interaction: Phone call     Purpose of Notification:  Sexton not draining, unable to irrigate, thick clots expelling from around the sexton. 16fr catheter currently in.       Orders Received:  OK to place larger sexton      1/12/22 @ 0130    Update to Dr Luu: 22fr 3 way catheter placed, 800ml of bloody urine returned, do you want CBI? No CBI at this time, allow for draining overnight, will reassess in the morning.

## 2022-01-12 NOTE — PLAN OF CARE
[id number]:  A/Ox2, to self and place only. Up A2 w/GB and Walker. On mod carb /diabetic diet. VSS on RA except high BP.Denied N/V and pain. Bladder discomfort d/t clotting of sexton.  Incontinent for bowel, BM this shift x1. 16fr Sexton was removed d/t clotting and no urine coming out. Notified Urologist, new order for 22fr 3 way sexton catheter in place now with 800mL bloody urine returned, pt reported less painful. Pt was confused, restless and agitated at times with nurses, notified MD and received order Zyprexa PO PRN, patient refused to take med intervention. PIV NS infusing 100mL/ hr with abx piggybag. LLE redden, edematous, tender to touch. Discharge plan pending.

## 2022-01-13 ENCOUNTER — APPOINTMENT (OUTPATIENT)
Dept: PHYSICAL THERAPY | Facility: CLINIC | Age: 82
DRG: 698 | End: 2022-01-13
Payer: COMMERCIAL

## 2022-01-13 ENCOUNTER — APPOINTMENT (OUTPATIENT)
Dept: OCCUPATIONAL THERAPY | Facility: CLINIC | Age: 82
DRG: 698 | End: 2022-01-13
Payer: COMMERCIAL

## 2022-01-13 LAB
ERYTHROCYTE [DISTWIDTH] IN BLOOD BY AUTOMATED COUNT: 13.7 % (ref 10–15)
GLUCOSE BLDC GLUCOMTR-MCNC: 139 MG/DL (ref 70–99)
GLUCOSE BLDC GLUCOMTR-MCNC: 150 MG/DL (ref 70–99)
GLUCOSE BLDC GLUCOMTR-MCNC: 205 MG/DL (ref 70–99)
GLUCOSE BLDC GLUCOMTR-MCNC: 214 MG/DL (ref 70–99)
GLUCOSE BLDC GLUCOMTR-MCNC: 228 MG/DL (ref 70–99)
HCT VFR BLD AUTO: 32.8 % (ref 40–53)
HGB BLD-MCNC: 10.5 G/DL (ref 13.3–17.7)
LACTATE SERPL-SCNC: 1.4 MMOL/L (ref 0.7–2)
MCH RBC QN AUTO: 29.3 PG (ref 26.5–33)
MCHC RBC AUTO-ENTMCNC: 32 G/DL (ref 31.5–36.5)
MCV RBC AUTO: 92 FL (ref 78–100)
PLATELET # BLD AUTO: 205 10E3/UL (ref 150–450)
PROCALCITONIN SERPL-MCNC: 2.08 NG/ML
RBC # BLD AUTO: 3.58 10E6/UL (ref 4.4–5.9)
WBC # BLD AUTO: 10.4 10E3/UL (ref 4–11)

## 2022-01-13 PROCEDURE — 250N000011 HC RX IP 250 OP 636: Performed by: HOSPITALIST

## 2022-01-13 PROCEDURE — 97110 THERAPEUTIC EXERCISES: CPT | Mod: GO

## 2022-01-13 PROCEDURE — 36415 COLL VENOUS BLD VENIPUNCTURE: CPT | Performed by: HOSPITALIST

## 2022-01-13 PROCEDURE — 97530 THERAPEUTIC ACTIVITIES: CPT | Mod: GP

## 2022-01-13 PROCEDURE — 250N000013 HC RX MED GY IP 250 OP 250 PS 637: Performed by: INTERNAL MEDICINE

## 2022-01-13 PROCEDURE — 99233 SBSQ HOSP IP/OBS HIGH 50: CPT | Performed by: INTERNAL MEDICINE

## 2022-01-13 PROCEDURE — 83605 ASSAY OF LACTIC ACID: CPT | Performed by: HOSPITALIST

## 2022-01-13 PROCEDURE — 250N000013 HC RX MED GY IP 250 OP 250 PS 637: Performed by: HOSPITALIST

## 2022-01-13 PROCEDURE — 120N000001 HC R&B MED SURG/OB

## 2022-01-13 PROCEDURE — 85027 COMPLETE CBC AUTOMATED: CPT | Performed by: HOSPITALIST

## 2022-01-13 PROCEDURE — 36415 COLL VENOUS BLD VENIPUNCTURE: CPT | Performed by: INTERNAL MEDICINE

## 2022-01-13 PROCEDURE — 84145 PROCALCITONIN (PCT): CPT | Performed by: INTERNAL MEDICINE

## 2022-01-13 RX ORDER — AMLODIPINE BESYLATE 5 MG/1
5 TABLET ORAL DAILY
Status: DISCONTINUED | OUTPATIENT
Start: 2022-01-13 | End: 2022-01-14

## 2022-01-13 RX ORDER — TRIAMCINOLONE ACETONIDE 0.25 MG/G
1 CREAM TOPICAL 2 TIMES DAILY
Status: DISCONTINUED | OUTPATIENT
Start: 2022-01-13 | End: 2022-01-14

## 2022-01-13 RX ADMIN — CITALOPRAM HYDROBROMIDE 20 MG: 20 TABLET ORAL at 08:09

## 2022-01-13 RX ADMIN — CEFTRIAXONE SODIUM 1 G: 1 INJECTION, POWDER, FOR SOLUTION INTRAMUSCULAR; INTRAVENOUS at 10:05

## 2022-01-13 RX ADMIN — ROPINIROLE HYDROCHLORIDE 0.25 MG: 0.25 TABLET, FILM COATED ORAL at 12:11

## 2022-01-13 RX ADMIN — MICONAZOLE NITRATE: 20 CREAM TOPICAL at 08:15

## 2022-01-13 RX ADMIN — LOPERAMIDE HYDROCHLORIDE 2 MG: 2 CAPSULE ORAL at 08:09

## 2022-01-13 RX ADMIN — ROPINIROLE HYDROCHLORIDE 0.25 MG: 0.25 TABLET, FILM COATED ORAL at 08:09

## 2022-01-13 RX ADMIN — DONEPEZIL HYDROCHLORIDE 10 MG: 10 TABLET ORAL at 08:09

## 2022-01-13 RX ADMIN — OLANZAPINE 5 MG: 5 TABLET, FILM COATED ORAL at 13:17

## 2022-01-13 RX ADMIN — TRIAMCINOLONE ACETONIDE 1 G: 0.25 CREAM TOPICAL at 20:44

## 2022-01-13 RX ADMIN — FINASTERIDE 5 MG: 5 TABLET, FILM COATED ORAL at 08:09

## 2022-01-13 RX ADMIN — LOPERAMIDE HYDROCHLORIDE 2 MG: 2 CAPSULE ORAL at 20:40

## 2022-01-13 RX ADMIN — ROPINIROLE HYDROCHLORIDE 0.5 MG: 0.5 TABLET, FILM COATED ORAL at 22:25

## 2022-01-13 RX ADMIN — AMLODIPINE BESYLATE 5 MG: 5 TABLET ORAL at 10:02

## 2022-01-13 RX ADMIN — OLANZAPINE 5 MG: 5 TABLET, FILM COATED ORAL at 20:35

## 2022-01-13 RX ADMIN — CLOTRIMAZOLE: 1 CREAM TOPICAL at 08:15

## 2022-01-13 RX ADMIN — MICONAZOLE NITRATE: 20 CREAM TOPICAL at 20:43

## 2022-01-13 RX ADMIN — ATORVASTATIN CALCIUM 20 MG: 20 TABLET, FILM COATED ORAL at 08:08

## 2022-01-13 RX ADMIN — CLOTRIMAZOLE: 1 CREAM TOPICAL at 20:43

## 2022-01-13 RX ADMIN — IRBESARTAN 300 MG: 150 TABLET ORAL at 08:09

## 2022-01-13 RX ADMIN — ROPINIROLE HYDROCHLORIDE 0.25 MG: 0.25 TABLET, FILM COATED ORAL at 20:37

## 2022-01-13 RX ADMIN — INSULIN ASPART 2 UNITS: 100 INJECTION, SOLUTION INTRAVENOUS; SUBCUTANEOUS at 17:09

## 2022-01-13 RX ADMIN — Medication 125 MCG: at 20:40

## 2022-01-13 ASSESSMENT — ACTIVITIES OF DAILY LIVING (ADL)
ADLS_ACUITY_SCORE: 24
ADLS_ACUITY_SCORE: 24
ADLS_ACUITY_SCORE: 26
ADLS_ACUITY_SCORE: 26
ADLS_ACUITY_SCORE: 24
ADLS_ACUITY_SCORE: 26
ADLS_ACUITY_SCORE: 24
ADLS_ACUITY_SCORE: 26
ADLS_ACUITY_SCORE: 24
ADLS_ACUITY_SCORE: 26
ADLS_ACUITY_SCORE: 26
ADLS_ACUITY_SCORE: 24
ADLS_ACUITY_SCORE: 26
ADLS_ACUITY_SCORE: 24
ADLS_ACUITY_SCORE: 24
ADLS_ACUITY_SCORE: 26
ADLS_ACUITY_SCORE: 26
ADLS_ACUITY_SCORE: 24
ADLS_ACUITY_SCORE: 24

## 2022-01-13 NOTE — PLAN OF CARE
1/12/2022 [id number]:      Pt alert to self only - [place]. VSS on RA, CPAP overnight. Sequeira in place, incont of bowel. PIV SL. Up A2 w/ Gb + W, no OOB overnight. Lactic protocol fired - negative . LLE red and swollen. Discharge to TCU / rehab possibly today.

## 2022-01-13 NOTE — PLAN OF CARE
Shift Note: 01/12/2022 [id number]  A/O to self only, confused about situation, location, believes staff is his family. Intermittently agitated, refused some cares, refused scheduled medications first several times offered. Up x2 w/ GB, walker. Pivoted to chair well with x1, GB, walker. Incont of bowel, one loose stool this evening. When standing and getting cleaned by aide, pt slid off edge of bed and aide assisted pt to floor in sitting position. No pt injuries, VSS post-fall. Used lift to get back into bed.  Tolerating diabetic diet, decreased appetite. BGM AC/HS. Takes scheduled imodium (baseline per pt). Chronic Sequeira in place, good UOP this shift, clear/yellow. No orders for manual flushing, but it was discussed by urology per their note. Good UOP in bag, not flushed this shift due to pt's agitation.  LLE reddened, edematous, tender to touch. PIV SL. Received one-time Lasix dose this shift for elevated BNP. Possible discharge tomorrow 1/13 to TCU/rehab.

## 2022-01-13 NOTE — PROGRESS NOTES
"[facility]    Medicine Progress Note - Hospitalist Service       Date of Admission:  1/10/2022    Assessment & Plan           Chavez Mccain is a 81 year old male admitted on 1/10/2022.   medical history significant for frontotemporal dementia, chronic indwelling Sequeira catheter, CKD, DM 2, HTN, HLD was brought to the ER for evaluation of generalized weakness, confusion, and is being admitted on 1/10/2022 for further.         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  Noted left leg erythema.  Marked leukocytosis, elevated lactic acid.  Abnormal UA but culture growing Candida only.  Abnormal chest x-ray, but LLL opacity on prior x-ray and has no respiratory symptoms to suggest PNA.     * LE US negative for DVT  - transition from zosyn to ceftriaxone 1/12 to cover cellulitis and possible PNA  - blood cultures no growth after 2 days  - discontinued IVF  - WBC is now normal  - recheck procalcitonin, especially in light of \"patchy opacity at left lung base\" seen on CXR.  Expect procalcitonin to improve with appropriate antibiotic treatment.  - clotrimazole cream to glans bid x7 days per Urology  - PT OT eval,  consult for discharge planning; had very bad experience at TCU in recent past, but now desires rehab stay  - trouble with urinary retention due to clotting 1/12, improved with irrgation  - Urology recs appreciated  - reported some dyspnea 1/12; BNP modestly elevated.  Gave Lasix 20 mg IV x 1  - Resume topical steroid for rash on leg     CHF  - BNP is elevated, likely related to prior IVF. He no longer requires supplemental oxygen  - I cannot locate any prior Echo from [place] records or Care Everywhere. Many blood pressure readings are above goal.  Will check Echo to guide management of hypertension, CHF    Acute metabolic encephalopathy, " resolving  Frontotemporal dementia  Generalized weakness  * He reportedly had left leg weakness and slurred speech during his prior admission with UTI and wife was also concerned about weakness, EMS reported slurred speech but his mouth is extremely dry otherwise no overt deficits noted on exam.  Hold off on further work-up at this time.  - Continue PTA Requip, donepezil, Celexa  - confused; oriented to self and hospital only (thought it was [facility])       Possible left otitis media  -Scanty drainage noted in left ear.  Above antibiotics would cover if he has otitis media.     CKD stage II  Creatinine at baseline 0.9-1  - Monitor, stable      DM2  A1C 7.7% this admission  [PTA on metformin and glipizide]  - continue medium ssi for now  - blood sugar readings are at goal     HLD, HTN:   - continue PTA irbesartan and statin     TEETEE  - continue CPAP with home settings       Diet: Moderate Consistent Carb (60 g CHO per Meal) Diet    DVT Prophylaxis: Pneumatic Compression Devices  Sequeira Catheter: PRESENT, indication: Retention, Other (Comment) (chronic Sequeira)  Central Lines: None  Code Status: No CPR- Do NOT Intubate      Disposition Plan   Expected Discharge: tomorrow  Anticipated discharge location: inpatient rehabilitation facility    Delays:       Total time spent:  > 35 minutes  Time spent counseling, coordination of care: 25 minutes including discussion with care team and wife, Maria Dolores, regarding swelling in leg, rash, antibiotics, urinary catheter, discharge planning, personal review and interpretation of labs and studies as noted above     Iris Berry MD  Hospitalist Service  [facility]  Securely message with the Vocera Web Console (learn more here)  Text page via [employer] Paging/Directory      Clinically Significant Risk Factors Present on Admission              # Diabetes, type II: last A1C 7.7 % (Ref range: 0.0 - 5.6 %)  # Severe Obesity: last Body mass index is 41.2 kg/m .   "    ______________________________________________________________________    Interval History   \"I am staying?\"  Patient asked if he is going to remain in hospital, he feels ready to leave.  I had a lengthy discussion with his wife, Maria Dolores, at the bedside.  She is concerned about rash on Chavez's left leg, leg is swollen.  We discussed is dyspnea, treatment with the Lasix, he is feeling better.  We discussed that he will likely be ready for discharge to TCU tomorrow, if facility is available.    Data reviewed today: I reviewed all medications, new labs and imaging results over the last 24 hours. I personally reviewed no images or EKG's today.    Physical Exam   Vital Signs: Temp: 97.4  F (36.3  C) Temp src: Oral BP: (!) 174/80 Pulse: 76   Resp: 18 SpO2: 93 % O2 Device: None (Room air)    Weight: 240 lbs 0 oz     General Appearance: Well nourished male in NAD  Respiratory: lungs CTAB, no wheezes or crackles no tachypnea   Cardiovascular: RRR, normal s1/s2 without murmur  GI: abdomen soft, normal bowel sounds  Skin: Taut edema in left lower extremity  Other: Moves arms and legs, got on the commode with assistance from staff    Data   Recent Labs   Lab 01/13/22  0833 01/13/22  0507 01/13/22  0200 01/12/22  2221 01/12/22  0728 01/12/22  0630 01/11/22  0045 01/10/22  2013 01/10/22  1820   0000   WBC  --  10.4  --   --   --  14.0*  --   --  20.9*  --    HGB  --  10.5*  --   --   --  10.5*  --   --  11.4*  --    MCV  --  92  --   --   --  91  --   --  93  --    PLT  --  205  --   --   --  181  --   --  203  --    INR  --   --   --   --   --   --   --   --  1.10  --    NA  --   --   --   --   --  139  --  138  --   --    POTASSIUM  --   --   --   --   --  3.5  --  4.2  --   --    CHLORIDE  --   --   --   --   --  110*  --  106  --   --    CO2  --   --   --   --   --  23  --  25  --   --    BUN  --   --   --   --   --  14  --  19  --   --    CR  --   --   --   --   --  0.97  --  1.04  --   --    ANIONGAP  --   --   --   " --   --  6  --  7  --   --    CLAUDIA  --   --   --   --   --  8.2*  --  8.5  --   --    *  --  150* 174*   < > 148*   < > 222*  --    < >   ALBUMIN  --   --   --   --   --   --   --  2.8*  --   --    PROTTOTAL  --   --   --   --   --   --   --  7.0  --   --    BILITOTAL  --   --   --   --   --   --   --  1.1  --   --    ALKPHOS  --   --   --   --   --   --   --  107  --   --    ALT  --   --   --   --   --   --   --  49  --   --    AST  --   --   --   --   --   --   --  31  --   --     < > = values in this interval not displayed.

## 2022-01-13 NOTE — PROGRESS NOTES
Care Management Follow Up    Length of Stay (days): 3    Expected Discharge Date: 01/14/2022     Concerns to be Addressed: discharge planning     Patient plan of care discussed at interdisciplinary rounds: Yes    Anticipated Discharge Disposition: Transitional Care,Home Care,DME     Anticipated Discharge Services: None  Anticipated Discharge DME: None    Patient/family educated on Medicare website which has current facility and service quality ratings: no (spouse aware of medicare ratings)  Education Provided on the Discharge Plan:    Patient/Family in Agreement with the Plan: other (see comments) (awaiting pt/ot)    Referrals Placed by CM/SW: Transportation  Private pay costs discussed: Not applicable    Additional Information:  SW met with patient and spouse to update that [facility] did not have any beds available and to obtain additional referral choices. Spouse requesting high rated facilities as they had a negative experience at a previous facility. SW sent additional referrals out to facilities [place] [facility].  [facility] Medicare Advantage Prior Auth form completed and faxed to [facility].    ALEX Hinton

## 2022-01-14 ENCOUNTER — APPOINTMENT (OUTPATIENT)
Dept: CARDIOLOGY | Facility: CLINIC | Age: 82
DRG: 698 | End: 2022-01-14
Attending: INTERNAL MEDICINE
Payer: COMMERCIAL

## 2022-01-14 PROBLEM — F02.80 FRONTOTEMPORAL DEMENTIA (H): Status: ACTIVE | Noted: 2022-01-14

## 2022-01-14 PROBLEM — G47.33 OSA ON CPAP: Status: ACTIVE | Noted: 2022-01-14

## 2022-01-14 PROBLEM — R53.1 WEAKNESS: Status: RESOLVED | Noted: 2021-07-11 | Resolved: 2022-01-14

## 2022-01-14 PROBLEM — R29.898 LEFT LEG WEAKNESS: Status: RESOLVED | Noted: 2021-07-11 | Resolved: 2022-01-14

## 2022-01-14 PROBLEM — G93.41 SEPTIC ENCEPHALOPATHY: Status: ACTIVE | Noted: 2022-01-14

## 2022-01-14 PROBLEM — J18.9 PNEUMONIA: Status: ACTIVE | Noted: 2022-01-14

## 2022-01-14 PROBLEM — G31.09 FRONTOTEMPORAL DEMENTIA (H): Status: ACTIVE | Noted: 2022-01-14

## 2022-01-14 PROBLEM — R41.0 CONFUSION: Status: RESOLVED | Noted: 2021-07-11 | Resolved: 2022-01-14

## 2022-01-14 PROBLEM — Z97.8 PRESENCE OF INDWELLING FOLEY CATHETER: Status: RESOLVED | Noted: 2021-07-11 | Resolved: 2022-01-14

## 2022-01-14 LAB
ANION GAP SERPL CALCULATED.3IONS-SCNC: 7 MMOL/L (ref 3–14)
BUN SERPL-MCNC: 13 MG/DL (ref 7–30)
CALCIUM SERPL-MCNC: 8.7 MG/DL (ref 8.5–10.1)
CHLORIDE BLD-SCNC: 111 MMOL/L (ref 94–109)
CO2 SERPL-SCNC: 24 MMOL/L (ref 20–32)
CREAT SERPL-MCNC: 0.95 MG/DL (ref 0.66–1.25)
ERYTHROCYTE [DISTWIDTH] IN BLOOD BY AUTOMATED COUNT: 13.4 % (ref 10–15)
GFR SERPL CREATININE-BSD FRML MDRD: 80 ML/MIN/1.73M2
GLUCOSE BLD-MCNC: 215 MG/DL (ref 70–99)
GLUCOSE BLDC GLUCOMTR-MCNC: 216 MG/DL (ref 70–99)
HCT VFR BLD AUTO: 32.7 % (ref 40–53)
HGB BLD-MCNC: 10.9 G/DL (ref 13.3–17.7)
LVEF ECHO: NORMAL
MCH RBC QN AUTO: 30.2 PG (ref 26.5–33)
MCHC RBC AUTO-ENTMCNC: 33.3 G/DL (ref 31.5–36.5)
MCV RBC AUTO: 91 FL (ref 78–100)
PLATELET # BLD AUTO: 216 10E3/UL (ref 150–450)
POTASSIUM BLD-SCNC: 2.9 MMOL/L (ref 3.4–5.3)
RBC # BLD AUTO: 3.61 10E6/UL (ref 4.4–5.9)
SODIUM SERPL-SCNC: 142 MMOL/L (ref 133–144)
WBC # BLD AUTO: 8.6 10E3/UL (ref 4–11)

## 2022-01-14 PROCEDURE — 82310 ASSAY OF CALCIUM: CPT | Performed by: INTERNAL MEDICINE

## 2022-01-14 PROCEDURE — 93306 TTE W/DOPPLER COMPLETE: CPT | Mod: 26 | Performed by: INTERNAL MEDICINE

## 2022-01-14 PROCEDURE — 85014 HEMATOCRIT: CPT | Performed by: INTERNAL MEDICINE

## 2022-01-14 PROCEDURE — 999N000208 ECHOCARDIOGRAM COMPLETE

## 2022-01-14 PROCEDURE — 250N000013 HC RX MED GY IP 250 OP 250 PS 637: Performed by: INTERNAL MEDICINE

## 2022-01-14 PROCEDURE — 255N000002 HC RX 255 OP 636: Performed by: INTERNAL MEDICINE

## 2022-01-14 PROCEDURE — 99232 SBSQ HOSP IP/OBS MODERATE 35: CPT | Performed by: INTERNAL MEDICINE

## 2022-01-14 PROCEDURE — 120N000001 HC R&B MED SURG/OB

## 2022-01-14 PROCEDURE — 36415 COLL VENOUS BLD VENIPUNCTURE: CPT | Performed by: INTERNAL MEDICINE

## 2022-01-14 PROCEDURE — 250N000011 HC RX IP 250 OP 636: Performed by: INTERNAL MEDICINE

## 2022-01-14 RX ORDER — NITROFURANTOIN MACROCRYSTALS 50 MG/1
50 CAPSULE ORAL AT BEDTIME
Status: DISCONTINUED | OUTPATIENT
Start: 2022-01-15 | End: 2022-01-14

## 2022-01-14 RX ORDER — NITROFURANTOIN MACROCRYSTALS 50 MG/1
50 CAPSULE ORAL AT BEDTIME
Status: DISCONTINUED | OUTPATIENT
Start: 2022-01-14 | End: 2022-01-14

## 2022-01-14 RX ORDER — LOPERAMIDE HCL 2 MG
2 CAPSULE ORAL 2 TIMES DAILY PRN
Status: DISCONTINUED | OUTPATIENT
Start: 2022-01-14 | End: 2022-01-15

## 2022-01-14 RX ORDER — IRBESARTAN 150 MG/1
300 TABLET ORAL DAILY
Status: DISCONTINUED | OUTPATIENT
Start: 2022-01-14 | End: 2022-01-14

## 2022-01-14 RX ORDER — AMLODIPINE BESYLATE 10 MG/1
10 TABLET ORAL DAILY
Status: DISCONTINUED | OUTPATIENT
Start: 2022-01-14 | End: 2022-01-14

## 2022-01-14 RX ADMIN — CLOTRIMAZOLE: 1 CREAM TOPICAL at 12:11

## 2022-01-14 RX ADMIN — CLOTRIMAZOLE: 1 CREAM TOPICAL at 20:50

## 2022-01-14 RX ADMIN — NITROFURANTOIN MACROCRYSTALS 50 MG: 50 CAPSULE ORAL at 12:25

## 2022-01-14 RX ADMIN — MICONAZOLE NITRATE: 20 CREAM TOPICAL at 20:49

## 2022-01-14 RX ADMIN — MICONAZOLE NITRATE: 20 CREAM TOPICAL at 12:11

## 2022-01-14 RX ADMIN — HYDRALAZINE HYDROCHLORIDE 10 MG: 20 INJECTION INTRAMUSCULAR; INTRAVENOUS at 01:56

## 2022-01-14 RX ADMIN — INSULIN ASPART 2 UNITS: 100 INJECTION, SOLUTION INTRAVENOUS; SUBCUTANEOUS at 10:29

## 2022-01-14 RX ADMIN — TRIAMCINOLONE ACETONIDE 1 G: 0.25 CREAM TOPICAL at 12:10

## 2022-01-14 RX ADMIN — HUMAN ALBUMIN MICROSPHERES AND PERFLUTREN 9 ML: 10; .22 INJECTION, SOLUTION INTRAVENOUS at 11:06

## 2022-01-14 ASSESSMENT — ACTIVITIES OF DAILY LIVING (ADL)
ADLS_ACUITY_SCORE: 25
ADLS_ACUITY_SCORE: 24
ADLS_ACUITY_SCORE: 25
ADLS_ACUITY_SCORE: 24
ADLS_ACUITY_SCORE: 25
ADLS_ACUITY_SCORE: 24
ADLS_ACUITY_SCORE: 25
ADLS_ACUITY_SCORE: 24

## 2022-01-14 NOTE — PROGRESS NOTES
"[facility]    Medicine Progress Note - Hospitalist Service       Date of Admission:  1/10/2022    Assessment & Plan           81 year old male admitted on 1/10/2022.   medical history significant for frontotemporal dementia, chronic indwelling Sequeira catheter, CKD, DM 2, HTN, HLD was brought to the ER for evaluation of generalized weakness, confusion:        Catheter associated UTI  Possible LLE cellulitis  Possible left basilar pneumonia  Ulcer in the glans penis  Patient with increased sleepiness, confusion, weakness.  Noted left leg erythema.  Marked leukocytosis, elevated lactic acid.  Abnormal UA but culture growing Candida only.  Abnormal chest x-ray, but LLL opacity on prior x-ray and has no respiratory symptoms to suggest PNA.     * LE US negative for DVT  - transition from zosyn to ceftriaxone 1/12 to cover cellulitis and possible PNA  - blood cultures no growth after 2 days  - discontinued IVF  - WBC is now normal  - recheck procalcitonin, especially in light of \"patchy opacity at left lung base\" seen on CXR.  Expect procalcitonin to improve with appropriate antibiotic treatment.  - clotrimazole cream to glans bid x7 days per Urology  - PT OT eval,  consult for discharge planning; had very bad experience at TCU in recent past, but now desires rehab stay  - trouble with urinary retention due to clotting 1/12, improved with irrgation  - Urology recs appreciated  - reported some dyspnea 1/12; BNP modestly elevated.  Gave Lasix 20 mg IV x 1  - Resume topical steroid for rash on leg     CHF  - BNP is elevated, likely related to prior IVF. He no longer requires supplemental oxygen  - I cannot locate any prior Echo from [place] records or Care Everywhere. Many blood pressure readings are above goal.  Will check Echo to guide management of hypertension, CHF    Acute metabolic encephalopathy, resolving  Frontotemporal dementia  Generalized weakness  * He reportedly had left " leg weakness and slurred speech during his prior admission with UTI and wife was also concerned about weakness, EMS reported slurred speech but his mouth is extremely dry otherwise no overt deficits noted on exam.  Hold off on further work-up at this time.  - Continue PTA Requip, donepezil, Celexa  - confused; oriented to self and hospital only (thought it was [facility])       Possible left otitis media  -Scanty drainage noted in left ear.  Above antibiotics would cover if he has otitis media.     CKD stage II  Creatinine at baseline 0.9-1  - Monitor, stable      DM2  A1C 7.7% this admission  [PTA on metformin and glipizide]  - continue medium ssi for now  - blood sugar readings are at goal     HLD, HTN:   - continue PTA irbesartan and statin     TEETEE  - continue CPAP with home settings    Plan -- met with wife and .  Given all options his wife said she thinks it time for hospice.  She has been caring for him at home for the last 11 yrs by herself, currently he needs 2 people to transfer.  Will switch to comfort care and over next 1-2 days see if is maintaining or actively dying -- in which case would get [facility] involved.     Diet: Regular Diet Adult    DVT Prophylaxis: Pneumatic Compression Devices  Sequeira Catheter: PRESENT, indication: Retention, Other (Comment) (chronic Sequeira)  Central Lines: None  Code Status: No CPR- Do NOT Intubate , comfort care    Disposition Plan    Expected Discharge:  Anticipated discharge possibly home with hospice vs [facility] hospice     Malik Brown MD  Pager: [phone]  Cell Phone:  [phone]   [facility]  Securely message with the Vocera Web Console (learn more here)  ______________________________________________________________________    Interval History   Minimal oral intake, no new complaints.    Physical Exam   Vital Signs: Temp: (!) 96.2  F (35.7  C) Temp src: Axillary BP: (!) 182/81 Pulse: 76   Resp: 18 SpO2: 94 % O2  Device: None (Room air)    Weight: 240 lbs 0 oz     General Appearance: Well nourished male in NAD  Respiratory: lungs CTAB, no wheezes or crackles no tachypnea   Cardiovascular: RRR, normal s1/s2 without murmur  GI: abdomen soft, normal bowel sounds  Skin: Taut edema in left lower extremity  Other: Moves arms and legs, got on the commode with assistance from staff    Data   Recent Labs   Lab 01/14/22  1006 01/14/22  0708 01/13/22  2142 01/13/22  0833 01/13/22  0507 01/12/22  0728 01/12/22  0630 01/11/22  0045 01/10/22  2013 01/10/22  1820   0000   WBC  --  8.6  --   --  10.4  --  14.0*  --   --  20.9*  --    HGB  --  10.9*  --   --  10.5*  --  10.5*  --   --  11.4*  --    MCV  --  91  --   --  92  --  91  --   --  93  --    PLT  --  216  --   --  205  --  181  --   --  203  --    INR  --   --   --   --   --   --   --   --   --  1.10  --    NA  --  142  --   --   --   --  139  --  138  --   --    POTASSIUM  --  2.9*  --   --   --   --  3.5  --  4.2  --   --    CHLORIDE  --  111*  --   --   --   --  110*  --  106  --   --    CO2  --  24  --   --   --   --  23  --  25  --   --    BUN  --  13  --   --   --   --  14  --  19  --   --    CR  --  0.95  --   --   --   --  0.97  --  1.04  --   --    ANIONGAP  --  7  --   --   --   --  6  --  7  --   --    CLAUDIA  --  8.7  --   --   --   --  8.2*  --  8.5  --   --    * 215* 228*   < >  --    < > 148*   < > 222*  --    < >   ALBUMIN  --   --   --   --   --   --   --   --  2.8*  --   --    PROTTOTAL  --   --   --   --   --   --   --   --  7.0  --   --    BILITOTAL  --   --   --   --   --   --   --   --  1.1  --   --    ALKPHOS  --   --   --   --   --   --   --   --  107  --   --    ALT  --   --   --   --   --   --   --   --  49  --   --    AST  --   --   --   --   --   --   --   --  31  --   --     < > = values in this interval not displayed.

## 2022-01-14 NOTE — PLAN OF CARE
AOx2, disoriented to time and situation. [place], hears better in R ear. Pt insistent on trying to leave, PRN zyprexa given 1x for agitation. VSS on RA ex HTN, BP decreased with scheduled morning meds, PRN BP meds available if SBP > 160. CPAP on while sleeping. Up A2 and GB/W, pivot to BSC. Pt able to shift weight with reminding. Mod carb diet, poor appetite. Denies pain and N/V. Sequeira with good UOP, irrigation q 8 hrs, no clots. 1 soft BM this shift. PIV SL w/int abx. ECHO ordered, still needs to be done. LLE with swelling and redness, kenalog cream ordered. Worked with PT and OT. Uro following. Wife at bedside for most of shift. Discharge to TCU pending.

## 2022-01-14 NOTE — PROGRESS NOTES
Care Management Follow Up    Length of Stay (days): 4    Expected Discharge Date: 01/14/2022     Concerns to be Addressed: discharge planning     Patient plan of care discussed at interdisciplinary rounds: Yes    Anticipated Discharge Disposition: Hospice,Assisted Living     Anticipated Discharge Services: None  Anticipated Discharge DME: None    Patient/family educated on Medicare website which has current facility and service quality ratings: no (spouse aware of medicare ratings)  Education Provided on the Discharge Plan:    Patient/Family in Agreement with the Plan: yes    Referrals Placed by CM/SW: Post Acute Facilities,Hospice  Private pay costs discussed: Not applicable    Additional Information:  Met with pt's wife, Maria Dolores. She has decided to no longer pursue TCU for pt and focus on comfort care. Dr Jett involved with conversation and in agreement that it is time to focus on comfort and hospice care. Maria Dolores would like to take pt home. She has had pt assessed by hospice in the past. Discussed hospice and offered choice of agency. She would like to use [facility]. Referral sent. Ronald with [facility] will reach out to Maria Dolores this afternoon. Discussed hiring private duty services at home. Maria Dolores has been the primary caregiver and appears hesitant about level of care her spouse will need and what an outside caregiver would provide. CLARIBEL asked [facility] to assist with conversation as well.       ZOYA Lopez, LGSW  [phone]  [facility]

## 2022-01-14 NOTE — PLAN OF CARE
A/Ox1. Confused- restless-insistent on trying to leave, PRN zyprexa given 1x for agitation. VSS on RA ex HTN. PRN BP meds available if SBP > 160- Hydralazine 1mg  given once. Ax2. Pt able to shift weight. Mod carb diet. Denies pain and N/V. Irrigation X1-sexton with good output.  PIV SL w/int abx. ECHO ordered, still needs to be done. LLE with swelling and redness, kenalog cream applied.PT and OT following . CPAP removed multiple times. Possible discharge to TCU today.

## 2022-01-15 LAB
BACTERIA BLD CULT: NO GROWTH
BACTERIA BLD CULT: NO GROWTH

## 2022-01-15 PROCEDURE — 99231 SBSQ HOSP IP/OBS SF/LOW 25: CPT | Performed by: INTERNAL MEDICINE

## 2022-01-15 PROCEDURE — 120N000001 HC R&B MED SURG/OB

## 2022-01-15 PROCEDURE — 99207 PR CDG-MDM COMPONENT: MEETS LOW - DOWN CODED: CPT | Performed by: INTERNAL MEDICINE

## 2022-01-15 RX ORDER — NALOXONE HYDROCHLORIDE 0.4 MG/ML
0.2 INJECTION, SOLUTION INTRAMUSCULAR; INTRAVENOUS; SUBCUTANEOUS
Status: DISCONTINUED | OUTPATIENT
Start: 2022-01-15 | End: 2022-01-17 | Stop reason: HOSPADM

## 2022-01-15 RX ORDER — LORAZEPAM 2 MG/ML
1 CONCENTRATE ORAL EVERY 4 HOURS PRN
Status: DISCONTINUED | OUTPATIENT
Start: 2022-01-15 | End: 2022-01-17 | Stop reason: HOSPADM

## 2022-01-15 RX ORDER — HYDROXYZINE HYDROCHLORIDE 25 MG/1
25 TABLET, FILM COATED ORAL EVERY 4 HOURS PRN
Status: DISCONTINUED | OUTPATIENT
Start: 2022-01-15 | End: 2022-01-17 | Stop reason: HOSPADM

## 2022-01-15 RX ORDER — MORPHINE SULFATE 20 MG/ML
20 SOLUTION ORAL
Status: DISCONTINUED | OUTPATIENT
Start: 2022-01-15 | End: 2022-01-17 | Stop reason: HOSPADM

## 2022-01-15 RX ORDER — NALOXONE HYDROCHLORIDE 0.4 MG/ML
0.4 INJECTION, SOLUTION INTRAMUSCULAR; INTRAVENOUS; SUBCUTANEOUS
Status: DISCONTINUED | OUTPATIENT
Start: 2022-01-15 | End: 2022-01-17 | Stop reason: HOSPADM

## 2022-01-15 RX ORDER — ROPINIROLE 0.25 MG/1
0.25 TABLET, FILM COATED ORAL 3 TIMES DAILY PRN
Status: DISCONTINUED | OUTPATIENT
Start: 2022-01-15 | End: 2022-01-17 | Stop reason: HOSPADM

## 2022-01-15 RX ADMIN — MICONAZOLE NITRATE: 20 CREAM TOPICAL at 09:35

## 2022-01-15 RX ADMIN — CLOTRIMAZOLE: 1 CREAM TOPICAL at 21:07

## 2022-01-15 RX ADMIN — CLOTRIMAZOLE: 1 CREAM TOPICAL at 09:36

## 2022-01-15 RX ADMIN — MICONAZOLE NITRATE: 20 CREAM TOPICAL at 21:07

## 2022-01-15 ASSESSMENT — ACTIVITIES OF DAILY LIVING (ADL)
ADLS_ACUITY_SCORE: 25
ADLS_ACUITY_SCORE: 27
ADLS_ACUITY_SCORE: 29
ADLS_ACUITY_SCORE: 25
ADLS_ACUITY_SCORE: 25
ADLS_ACUITY_SCORE: 29
ADLS_ACUITY_SCORE: 25
ADLS_ACUITY_SCORE: 29
ADLS_ACUITY_SCORE: 25
ADLS_ACUITY_SCORE: 29
ADLS_ACUITY_SCORE: 25
ADLS_ACUITY_SCORE: 29
ADLS_ACUITY_SCORE: 25
ADLS_ACUITY_SCORE: 29
ADLS_ACUITY_SCORE: 29
ADLS_ACUITY_SCORE: 27
ADLS_ACUITY_SCORE: 27
ADLS_ACUITY_SCORE: 25
ADLS_ACUITY_SCORE: 25
ADLS_ACUITY_SCORE: 29

## 2022-01-15 NOTE — PLAN OF CARE
Occupational Therapy Discharge Summary    Reason for therapy discharge:    Change in medical status.  Transition to comfort cares    Progress towards therapy goal(s). See goals on Care Plan in [facility] electronic health record for goal details.  Goals not met.  Barriers to achieving goals:   limited tolerance for therapy.    Therapy recommendation(s):    No further therapy is recommended.

## 2022-01-15 NOTE — PROGRESS NOTES
"[facility]    Medicine Progress Note - Hospitalist Service       Date of Admission:  1/10/2022    Assessment & Plan           81 year old male admitted on 1/10/2022.   medical history significant for frontotemporal dementia, chronic indwelling Sequeira catheter, CKD, DM 2, HTN, HLD was brought to the ER for evaluation of generalized weakness, confusion:        Catheter associated UTI  Possible LLE cellulitis  Possible left basilar pneumonia  Ulcer in the glans penis  Patient with increased sleepiness, confusion, weakness.  Noted left leg erythema.  Marked leukocytosis, elevated lactic acid.  Abnormal UA but culture growing Candida only.  Abnormal chest x-ray, but LLL opacity on prior x-ray and has no respiratory symptoms to suggest PNA.     * LE US negative for DVT  - transition from zosyn to ceftriaxone 1/12 to cover cellulitis and possible PNA  - blood cultures no growth after 2 days  - discontinued IVF  - WBC is now normal  - recheck procalcitonin, especially in light of \"patchy opacity at left lung base\" seen on CXR.  Expect procalcitonin to improve with appropriate antibiotic treatment.  - clotrimazole cream to glans bid x7 days per Urology  - PT OT eval,  consult for discharge planning; had very bad experience at [facility] in recent past, but now desires rehab stay  - trouble with urinary retention due to clotting 1/12, improved with irrgation  - Urology recs appreciated  - reported some dyspnea 1/12; BNP modestly elevated.  Gave Lasix 20 mg IV x 1  - Resume topical steroid for rash on leg     CHF  - BNP is elevated, likely related to prior IVF. He no longer requires supplemental oxygen  - I cannot locate any prior Echo from [facility] records or Care Everywhere. Many blood pressure readings are above goal.  Will check Echo to guide management of hypertension, CHF    Acute metabolic encephalopathy, resolving  Frontotemporal dementia  Generalized weakness  * He reportedly had left " leg weakness and slurred speech during his prior admission with UTI and wife was also concerned about weakness, EMS reported slurred speech but his mouth is extremely dry otherwise no overt deficits noted on exam.  Hold off on further work-up at this time.  - Continue PTA Requip, donepezil, Celexa  - confused; oriented to self and hospital only (thought it was [facility])       Possible left otitis media  -Scanty drainage noted in left ear.  Above antibiotics would cover if he has otitis media.     CKD stage II  Creatinine at baseline 0.9-1  - Monitor, stable      DM2  A1C 7.7% this admission  [PTA on metformin and glipizide]  - continue medium ssi for now  - blood sugar readings are at goal     HLD, HTN:   - continue PTA irbesartan and statin     TEETEE  - continue CPAP with home settings    Plan -- met with wife and .  Given all options his wife said she thinks it time for hospice.  She has been caring for him at home for the last 11 yrs by herself, currently he needs 2 people to transfer.  Will switch to comfort care and over next 1-2 days see if is maintaining or actively dying -- in which case would get [facility] involved.     Diet: Regular Diet Adult    DVT Prophylaxis: Pneumatic Compression Devices  Sequeira Catheter: PRESENT, indication: End of Life, Other (Comment) (chronic Sequeira)  Central Lines: None  Code Status: No CPR- Do NOT Intubate , comfort care    Disposition Plan    Expected Discharge:  Anticipated home with [facility] on Monday 1/17/22 ... unless he declines prior to then.     Malik Brown MD  Pager: [phone]  Cell Phone:  [phone]   [facility]  Securely message with the Vocera Web Console (learn more here)  ______________________________________________________________________    Interval History   More alert today, and now taking oral intake.     Physical Exam   Vital Signs: Temp: (!) 96.4  F (35.8  C) Temp src: Oral BP: (!) 169/72  Pulse: (!) 128   Resp: 18 SpO2: 92 % O2 Device: None (Room air)    Weight: 240 lbs 0 oz     General Appearance: Well nourished male in NAD  Respiratory: lungs CTAB, no wheezes or crackles no tachypnea   Cardiovascular: RRR, normal s1/s2 without murmur  GI: abdomen soft, normal bowel sounds  Neuro: Alert, minimal speech, pleasant, drinking thin liquids without coughing     Data   Recent Labs   Lab 01/14/22  1006 01/14/22  0708 01/13/22  2142 01/13/22  0833 01/13/22  0507 01/12/22  0728 01/12/22  0630 01/11/22  0045 01/10/22  2013 01/10/22  1820   0000   WBC  --  8.6  --   --  10.4  --  14.0*  --   --  20.9*  --    HGB  --  10.9*  --   --  10.5*  --  10.5*  --   --  11.4*  --    MCV  --  91  --   --  92  --  91  --   --  93  --    PLT  --  216  --   --  205  --  181  --   --  203  --    INR  --   --   --   --   --   --   --   --   --  1.10  --    NA  --  142  --   --   --   --  139  --  138  --   --    POTASSIUM  --  2.9*  --   --   --   --  3.5  --  4.2  --   --    CHLORIDE  --  111*  --   --   --   --  110*  --  106  --   --    CO2  --  24  --   --   --   --  23  --  25  --   --    BUN  --  13  --   --   --   --  14  --  19  --   --    CR  --  0.95  --   --   --   --  0.97  --  1.04  --   --    ANIONGAP  --  7  --   --   --   --  6  --  7  --   --    CLAUDIA  --  8.7  --   --   --   --  8.2*  --  8.5  --   --    * 215* 228*   < >  --    < > 148*   < > 222*  --    < >   ALBUMIN  --   --   --   --   --   --   --   --  2.8*  --   --    PROTTOTAL  --   --   --   --   --   --   --   --  7.0  --   --    BILITOTAL  --   --   --   --   --   --   --   --  1.1  --   --    ALKPHOS  --   --   --   --   --   --   --   --  107  --   --    ALT  --   --   --   --   --   --   --   --  49  --   --    AST  --   --   --   --   --   --   --   --  31  --   --     < > = values in this interval not displayed.

## 2022-01-15 NOTE — PLAN OF CARE
Patient was intermittently alert, but disoriented and incoherent with conversation. He had vitals checked once per day per MD. Patient remains stable hemodynamically. He was able to eat and drink regular food. Loose bowel movement times once. Sequeira in place. Excoriation on groin, cream applied.Repositioned as needed and Q2h. Discharge planned for Monday at 1300 on home hospice.  Assessments deferred to comfort cares.

## 2022-01-15 NOTE — PROGRESS NOTES
Care Management Follow Up    Length of Stay (days): 5    Expected Discharge Date: 01/15/2022     Concerns to be Addressed: discharge planning     Patient plan of care discussed at interdisciplinary rounds: Yes    Anticipated Discharge Disposition: Hospice,Assisted Living     Anticipated Discharge Services: None  Anticipated Discharge DME: None    Patient/family educated on Medicare website which has current facility and service quality ratings: no (spouse aware of medicare ratings)  Education Provided on the Discharge Plan:    Patient/Family in Agreement with the Plan: yes    Referrals Placed by CM/SW: Post Acute Facilities,Hospice  Private pay costs discussed: transportation costs    Additional Information:  Writer talked with patients spouse Maria Dolores. Maria Dolores states that yesterdays meeting with [facility] went really well. Maria Dolores states that per MD they want to discharge on Monday with hospice. Maria Dolores states that her sons are coming to town on Monday and will be helping at home starting Monday afternoon so is requesting a discharge in the afternoon(1300). Maria Dolores would like stretcher transportation lined up at discharge and is aware of the fees. Maria Dolores would also like a hospital bed ordered through hospice as she feels he will not be getting out of bed. At this time, Maria Dolores does not want private duty home care lined up because she is equipped to take care of his catheter and can care for him in bed as well as her sons being there to help. Writer called and spoke with Irish at [facility] who is going to order the hospital bed and have the admin call SW regarding discharge on Monday.       ALEX Steward

## 2022-01-15 NOTE — PROVIDER NOTIFICATION
MD Notification    Notified Person: MD    Notified Person Name: Johnie    Notification Date/Time: 01/15/22, 1615    Notification Interaction: [employer] messaging    Purpose of Notification: Are we able to reorder the atarax for pt's c/o itchiness to his legs and ropinirole for restless leg PRN?    Orders Received:     Comments: MD will order.

## 2022-01-15 NOTE — PLAN OF CARE
Lethargic, alert at times, confused, Samish, on sexton, Q8 flushes, no BM this shift, Q2 turn and reposition, excoriation in the groin area, bed bath today, CPAP @ night, plan to discharge to home with home hospice. SW following.

## 2022-01-15 NOTE — PLAN OF CARE
[id number]: [facility]. Pt lethargic, opens eyes with repositioning. Very [place]. T/R Q2HR. Sequeira in place, excoriation noted to groin. Cpap on through night. Discharge pending home hospice.

## 2022-01-15 NOTE — PLAN OF CARE
Physical Therapy Discharge Summary    Reason for therapy discharge:    Change in medical status.  Transitioned to comfort cares    Progress towards therapy goal(s). See goals on Care Plan in [facility] electronic health record for goal details.  Goals not met.  Barriers to achieving goals:   limited tolerance for therapy.    Therapy recommendation(s):    No further therapy is recommended.

## 2022-01-16 VITALS
BODY MASS INDEX: 40.97 KG/M2 | HEART RATE: 84 BPM | SYSTOLIC BLOOD PRESSURE: 191 MMHG | HEIGHT: 64 IN | OXYGEN SATURATION: 93 % | TEMPERATURE: 97.7 F | RESPIRATION RATE: 20 BRPM | DIASTOLIC BLOOD PRESSURE: 80 MMHG | WEIGHT: 240 LBS

## 2022-01-16 PROCEDURE — 120N000001 HC R&B MED SURG/OB

## 2022-01-16 PROCEDURE — 99207 PR CDG-MDM COMPONENT: MEETS LOW - DOWN CODED: CPT | Performed by: INTERNAL MEDICINE

## 2022-01-16 PROCEDURE — 250N000013 HC RX MED GY IP 250 OP 250 PS 637: Performed by: INTERNAL MEDICINE

## 2022-01-16 PROCEDURE — 99231 SBSQ HOSP IP/OBS SF/LOW 25: CPT | Performed by: INTERNAL MEDICINE

## 2022-01-16 RX ADMIN — MICONAZOLE NITRATE: 2 POWDER TOPICAL at 06:39

## 2022-01-16 RX ADMIN — MICONAZOLE NITRATE: 20 CREAM TOPICAL at 08:59

## 2022-01-16 RX ADMIN — CLOTRIMAZOLE: 1 CREAM TOPICAL at 08:59

## 2022-01-16 RX ADMIN — MICONAZOLE NITRATE: 20 CREAM TOPICAL at 20:22

## 2022-01-16 ASSESSMENT — ACTIVITIES OF DAILY LIVING (ADL)
ADLS_ACUITY_SCORE: 25
ADLS_ACUITY_SCORE: 27
ADLS_ACUITY_SCORE: 27
ADLS_ACUITY_SCORE: 29
ADLS_ACUITY_SCORE: 25
ADLS_ACUITY_SCORE: 27
ADLS_ACUITY_SCORE: 25
ADLS_ACUITY_SCORE: 29
ADLS_ACUITY_SCORE: 25
ADLS_ACUITY_SCORE: 27
ADLS_ACUITY_SCORE: 25
ADLS_ACUITY_SCORE: 27
ADLS_ACUITY_SCORE: 27
ADLS_ACUITY_SCORE: 25
ADLS_ACUITY_SCORE: 27
ADLS_ACUITY_SCORE: 25
ADLS_ACUITY_SCORE: 27
ADLS_ACUITY_SCORE: 27
ADLS_ACUITY_SCORE: 29
ADLS_ACUITY_SCORE: 27
ADLS_ACUITY_SCORE: 29
ADLS_ACUITY_SCORE: 25
ADLS_ACUITY_SCORE: 27
ADLS_ACUITY_SCORE: 27

## 2022-01-16 NOTE — PROGRESS NOTES
Care Management Follow Up    Length of Stay (days): 6    Expected Discharge Date: 01/17/2022     Concerns to be Addressed: discharge planning     Patient plan of care discussed at interdisciplinary rounds: Yes    Anticipated Discharge Disposition: Hospice,Assisted Living     Anticipated Discharge Services: None  Anticipated Discharge DME: None    Patient/family educated on Medicare website which has current facility and service quality ratings: no (spouse aware of medicare ratings)  Education Provided on the Discharge Plan:  yes  Patient/Family in Agreement with the Plan: yes    Referrals Placed by CM/SW: Post Acute Facilities,Hospice  Private pay costs discussed: Not applicable    Additional Information:  Call placed to [employer] to arrange for stretcher transport at 13:00 tomorrow as patient is hospice, lethargic, confused, and needs closer monitoring in the back of the rig.  Faxed the facesheet and PCS to [employer].    Will continue to follow.      ZOYA Burgess, [facility]    [phone]  [facility]

## 2022-01-16 NOTE — PLAN OF CARE
Pt A/O, to self, place at times. All VS and assessment. Denies pain. Int nausea and vomiting from possible eating too fast, pt denies interventions. Regular diet. A2, T/R q2h. Sequeira in place w/ clear yellow UOP. Plan for discharge Monday to home w/ hospice via stretcher at 1300.

## 2022-01-16 NOTE — PROGRESS NOTES
[facility]    Medicine Progress Note - Hospitalist Service       Date of Admission:  1/10/2022    Assessment & Plan           81 year old male admitted on 1/10/2022.   medical history significant for frontotemporal dementia, chronic indwelling Sequeira catheter, CKD, DM 2, HTN, HLD was brought to the ER for evaluation of generalized weakness, confusion:        Catheter associated UTI  Possible LLE cellulitis  Possible left basilar pneumonia  Ulcer in the glans penis  Patient with increased sleepiness, confusion, weakness.  Noted left leg erythema.  Marked leukocytosis, elevated lactic acid.  Abnormal UA but culture growing Candida only.  Abnormal chest x-ray, but LLL opacity on prior x-ray and has no respiratory symptoms to suggest PNA.     * LE US negative for DVT  - transition from zosyn to ceftriaxone 1/12 to cover cellulitis and possible PNA  - blood cultures no growth after 2 days     CHF  - BNP is elevated, likely related to prior IVF. He no longer requires supplemental oxygen    Acute metabolic encephalopathy, resolving  Frontotemporal dementia  Generalized weakness  * He reportedly had left leg weakness and slurred speech during his prior admission with UTI and wife was also concerned about weakness, EMS reported slurred speech but his mouth is extremely dry otherwise no overt deficits noted on exam.  Hold off on further work-up at this time.  - Continue PTA Requip, donepezil, Celexa    Possible left otitis media  -Scanty drainage noted in left ear.  Above antibiotics would cover if he has otitis media.     CKD stage II  Creatinine at baseline 0.9-1  - Monitor, stable      DM2  A1C 7.7% this admission     HLD, HTN:   - continue PTA irbesartan and statin     TEETEE  - continue CPAP with home settings    Plan -- met with wife, and plans stretcher transport home tomorrow with [facility].     Diet: Regular Diet Adult    DVT Prophylaxis: Pneumatic Compression Devices  Sequeira Catheter:  PRESENT, indication: End of Life, Other (Comment) (chronic Sequeira)  Central Lines: None  Code Status: No CPR- Do NOT Intubate , comfort care    Disposition Plan    Expected Discharge:  Anticipated home with [facility] on Monday 1/17/22,    Malik Brown MD  Pager: [phone]  Cell Phone:  [phone]   [facility]  Securely message with the Vocera Web Console (learn more here)  ______________________________________________________________________    Interval History   Remains alert, taking PO, did have large BM, but also vomited twice yesterday and abd mildly distended.     Physical Exam   Vital Signs: Temp: 97.7  F (36.5  C) Temp src: Oral BP: (!) 191/80 Pulse: 84   Resp: 20 SpO2: 93 % O2 Device: None (Room air)    Weight: 240 lbs 0 oz     General Appearance: Well nourished male in NAD  Respiratory: lungs CTAB, no wheezes or crackles no tachypnea   Cardiovascular: RRR, normal s1/s2 without murmur  GI: abdomen semi-firm, moderately distended and decreased bowel sounds.   Neuro: Alert, minimal speech, pleasant, drinking thin liquids without coughing     Data   Recent Labs   Lab 01/14/22  1006 01/14/22  0708 01/13/22  2142 01/13/22  0833 01/13/22  0507 01/12/22  0728 01/12/22  0630 01/11/22  0045 01/10/22  2013 01/10/22  1820   0000   WBC  --  8.6  --   --  10.4  --  14.0*  --   --  20.9*  --    HGB  --  10.9*  --   --  10.5*  --  10.5*  --   --  11.4*  --    MCV  --  91  --   --  92  --  91  --   --  93  --    PLT  --  216  --   --  205  --  181  --   --  203  --    INR  --   --   --   --   --   --   --   --   --  1.10  --    NA  --  142  --   --   --   --  139  --  138  --   --    POTASSIUM  --  2.9*  --   --   --   --  3.5  --  4.2  --   --    CHLORIDE  --  111*  --   --   --   --  110*  --  106  --   --    CO2  --  24  --   --   --   --  23  --  25  --   --    BUN  --  13  --   --   --   --  14  --  19  --   --    CR  --  0.95  --   --   --   --  0.97  --  1.04  --   --     ANIONGAP  --  7  --   --   --   --  6  --  7  --   --    CLAUDIA  --  8.7  --   --   --   --  8.2*  --  8.5  --   --    * 215* 228*   < >  --    < > 148*   < > 222*  --    < >   ALBUMIN  --   --   --   --   --   --   --   --  2.8*  --   --    PROTTOTAL  --   --   --   --   --   --   --   --  7.0  --   --    BILITOTAL  --   --   --   --   --   --   --   --  1.1  --   --    ALKPHOS  --   --   --   --   --   --   --   --  107  --   --    ALT  --   --   --   --   --   --   --   --  49  --   --    AST  --   --   --   --   --   --   --   --  31  --   --     < > = values in this interval not displayed.

## 2022-01-16 NOTE — PLAN OF CARE
Patient was alert this shift for the most part. Spent time sitting in recliner. He had popsickle and sherbet. Reduced appetite. Patient had large soft bowel movement. No nausea. Chronic back pain. Declines intervention. Vitals checked once. BP was 191/80. Turning and repositioning as needed skin in groin and left shin improving with cream. Coccyx appears intact. Sequeira in place.Patient is waiting for a visit from his brother from [place].   Discharge tomorrow 1 pm to home hospice.

## 2022-01-16 NOTE — PROGRESS NOTES
Pt A/O to self and place. VS and assessments deferred due to comfort care status. Denies pain. Int nausea, declined interventions. Sequeira patent good uop. No bm this shift. Up A 2 with lift, not oob this shift. T/R q 2 h. Regular diet. L PIV SL. Plan to discharge home on hospice 1/17 via stretcher 1300

## 2022-01-17 PROCEDURE — 99239 HOSP IP/OBS DSCHRG MGMT >30: CPT | Performed by: INTERNAL MEDICINE

## 2022-01-17 RX ORDER — LORAZEPAM 2 MG/ML
1 CONCENTRATE ORAL EVERY 4 HOURS PRN
Qty: 30 ML | Refills: 0 | Status: SHIPPED | OUTPATIENT
Start: 2022-01-17

## 2022-01-17 RX ORDER — MORPHINE SULFATE 20 MG/ML
20 SOLUTION ORAL
Qty: 30 ML | Refills: 0 | Status: SHIPPED | OUTPATIENT
Start: 2022-01-17

## 2022-01-17 RX ADMIN — MICONAZOLE NITRATE: 20 CREAM TOPICAL at 10:07

## 2022-01-17 ASSESSMENT — ACTIVITIES OF DAILY LIVING (ADL)
ADLS_ACUITY_SCORE: 25
ADLS_ACUITY_SCORE: 31
ADLS_ACUITY_SCORE: 25
ADLS_ACUITY_SCORE: 25
ADLS_ACUITY_SCORE: 31
ADLS_ACUITY_SCORE: 27
ADLS_ACUITY_SCORE: 25
ADLS_ACUITY_SCORE: 25
ADLS_ACUITY_SCORE: 27
ADLS_ACUITY_SCORE: 25
ADLS_ACUITY_SCORE: 25
ADLS_ACUITY_SCORE: 31

## 2022-01-17 NOTE — DISCHARGE SUMMARY
[facility]    Discharge Summary  Hospitalist    Date of Admission:  1/10/2022  Date of Discharge:  1/17/2022  Discharging Provider: Malik Brown MD    Discharge Diagnoses   Principal Problem:    Severe sepsis (HR 95, RR 22, WBC 20.9, Lact 3.5, confusion)      Possible UTI with chronic sexton      Cellulitis of left lower extremity       Pneumonia, LLL       Septic encephalopathy    Active Problems:    Urinary retention w chronic sexton        Frontotemporal dementia, Advanced      TEETEE on CPAP      History of Present Illness   81 year old male with medical history significant for frontotemporal dementia, chronic indwelling Sexton catheter, CKD, DM 2, HTN, HLD was brought to the ER for evaluation of generalized weakness, confusion.  Symptoms have been for one day, and he has had frequent bladder infections.      In ER, noted left leg erythema which was not noted during the day per his wife. Temp 98.8, WBC 20.9, elevated lactic acid of 3.5 -->2.9 after fluids.  Procalcitonin was elevated at 3.6.  Chest x-ray showed patchy opacity in the left lung base, suspicious for pneumonia, glucose 222,  UA with > 182 WBC's and >182 RBC's  Urine culture and blood cultures were sent and IV Zosyn and vancomycin.    Hospital Course   Admitted to medical floor, Urine culture grew 50-100K Candida, and obtained left leg US which was negative for DVT.  Overall he became more alert, initially not eating but by discharge he was back to his baseline and taking oral food and awake during the day.     Going forward, discussed with wife whether to restart prophylactic antibiotics, and whether to consider long term nursing home -- and her spontaneous response was that she wanted to take him home, but she thought it was time for comfort care, and she requested hospice care at discharge, and she was connected with [facility] which will be involved with his home care.      Since he is currently taking PO well  I anticipate he will do OK until his next infection, at which point wife does not want to use antibiotics.   He will go home with sexton catheter in place and can change monthly and PRN as needed.     Malik Brown MD  Pager: [phone]  Cell Phone:  [phone]       Significant Results and Procedures   As above    Pending Results   These results will be followed up by Dr. Brown  Unresulted Labs Ordered in the Past 30 Days of this Admission     No orders found from 12/11/2021 to 1/11/2022.          Code Status   DNR / DNI, hospice care       Primary Care Physician   [facility]    Physical Exam   Temp: 97.7  F (36.5  C) Temp src: Oral BP: (!) 191/80     Resp: 20 SpO2: 93 % O2 Device: None (Room air)    Vitals:    01/10/22 1808   Weight: 108.9 kg (240 lb)     Vital Signs with Ranges  Temp:  [97.7  F (36.5  C)] 97.7  F (36.5  C)  Resp:  [20] 20  BP: (191)/(80) 191/80  SpO2:  [93 %] 93 %  I/O last 3 completed shifts:  In: 120 [P.O.:120]  Out: 1225 [Urine:825; Emesis/NG output:400]    Exam on discharge:   Awake, pleasant, does not answer questions.     Discharge Disposition   Discharged to home with hospice ([facility] involved)  Condition at discharge: Terminal    Consultations This Hospital Stay   PHARMACY TO DOSE VANCO  UROLOGY IP CONSULT  PHYSICAL THERAPY ADULT IP CONSULT  OCCUPATIONAL THERAPY ADULT IP CONSULT  CARE MANAGEMENT / SOCIAL WORK IP CONSULT  SPIRITUAL HEALTH SERVICES IP CONSULT    Time Spent on this Encounter   I spent a total of 35 minutes discharging this patient.     Discharge Orders      Home care nursing referral      Follow Up ([facility])    If you would like to establish new urology care with [facility], please call our office at the number below to schedule an appointment with one of our urologists. Recommend follow up in about 1 month.     [facility]  [address] [place] [address]  You may call [phone] with any  questions or concerns.   Central Appointment #: [phone]     Reason for your hospital stay    Severe bladder infection and underlying dementia.     Follow-up and recommended labs and tests     Follow up with with hospice nurse as needed.     Activity    Your activity upon discharge: activity as tolerated     MD face to face encounter    Documentation of Face to Face and Certification for Home Health Services    I certify that patient: Chavez Mccain is under my care and that I, or a nurse practitioner or physician's assistant working with me, had a face-to-face encounter that meets the physician face-to-face encounter requirements with this patient on: 1/17/2022.    This encounter with the patient was in whole, or in part, for the following medical condition, which is the primary reason for home health care: hospice care with advanced dementia and sexton with recurrent bladder infections.    I certify that, based on my findings, the following services are medically necessary home health services: Nursing.    My clinical findings support the need for the above services because: Nurse is needed: to assist with end of life care.    Further, I certify that my clinical findings support that this patient is homebound (i.e. absences from home require considerable and taxing effort and are for medical reasons or [employer] services or infrequently or of short duration when for other reasons) because: Leaving home is medically contraindicated for the following reason(s): unable to walk..    Based on the above findings. I certify that this patient is confined to the home and needs intermittent skilled nursing care, physical therapy and/or speech therapy.  The patient is under my care, and I have initiated the establishment of the plan of care.  This patient will be followed by a physician who will periodically review the plan of care.  Physician/Provider to provide follow up care: Clinic, [facility]  [facility] physician (the Medicare certified [facility] provider): Malik Brown MD  Physician Signature: See electronic signature associated with these discharge orders.  Date: 1/17/2022     Tubes and drains    You are going home with the following tubes or drains: sexton catheter to gravity drainage.     Diet    Follow this diet upon discharge: Orders Placed This Encounter      Regular Diet Adult     Discharge Medications   Current Discharge Medication List      START taking these medications    Details   LORazepam (ATIVAN) 2 MG/ML (HIGH CONC) oral solution Take 0.5 mLs (1 mg) by mouth every 4 hours as needed for anxiety or agitation  Qty: 30 mL, Refills: 0    Associated Diagnoses: Severe sepsis (H)      morphine sulfate (ROXANOL) 20 mg/mL (HIGH CONC) soln Take 1 mL (20 mg) by mouth every 2 hours as needed for moderate to severe pain  Qty: 30 mL, Refills: 0    Associated Diagnoses: Severe sepsis (H)         CONTINUE these medications which have NOT CHANGED    Details   miconazole (MICATIN) 2 % cream Apply topically 2 times daily      !! rOPINIRole (REQUIP) 0.25 MG tablet Take 0.25 mg by mouth 3 times daily      !! rOPINIRole (REQUIP) 0.5 MG tablet Take 0.5 mg by mouth At Bedtime      hydrOXYzine (ATARAX) 25 MG tablet Take 25 mg by mouth 3 times daily as needed for itching       !! - Potential duplicate medications found. Please discuss with provider.      STOP taking these medications       atorvastatin (LIPITOR) 20 MG tablet Comments:   Reason for Stopping:         Cholecalciferol (VITAMIN D3) 1000 UNIT TABS Comments:   Reason for Stopping:         citalopram (CELEXA) 20 MG tablet Comments:   Reason for Stopping:         donepezil (ARICEPT) 10 MG tablet Comments:   Reason for Stopping:         finasteride (PROSCAR) 5 MG tablet Comments:   Reason for Stopping:         glipiZIDE (GLUCOTROL XL) 5 MG 24 hr tablet Comments:   Reason for Stopping:         irbesartan (AVAPRO) 300 MG tablet  Comments:   Reason for Stopping:         loperamide (IMODIUM) 2 MG capsule Comments:   Reason for Stopping:         metFORMIN (GLUCOPHAGE-XR) 500 MG 24 hr tablet Comments:   Reason for Stopping:         nystatin (MYCOSTATIN) 424712 UNIT/GM POWD Comments:   Reason for Stopping:         triamcinolone (KENALOG) 0.025 % cream Comments:   Reason for Stopping:             Allergies   No Known Allergies  Data   Most Recent 3 CBC's:Recent Labs   Lab Test 01/14/22  0708 01/13/22  0507 01/12/22  0630   WBC 8.6 10.4 14.0*   HGB 10.9* 10.5* 10.5*   MCV 91 92 91    205 181      Most Recent 3 BMP's:  Recent Labs   Lab Test 01/14/22  1006 01/14/22  0708 01/13/22  2142 01/12/22  0728 01/12/22  0630 01/11/22  0045 01/10/22  2013   NA  --  142  --   --  139  --  138   POTASSIUM  --  2.9*  --   --  3.5  --  4.2   CHLORIDE  --  111*  --   --  110*  --  106   CO2  --  24  --   --  23  --  25   BUN  --  13  --   --  14  --  19   CR  --  0.95  --   --  0.97  --  1.04   ANIONGAP  --  7  --   --  6  --  7   CLAUDIA  --  8.7  --   --  8.2*  --  8.5   * 215* 228*   < > 148*   < > 222*    < > = values in this interval not displayed.     Most Recent 2 LFT's:  Recent Labs   Lab Test 01/10/22  2013 07/11/21  1359   AST 31 38   ALT 49 60   ALKPHOS 107 97   BILITOTAL 1.1 0.6     Most Recent INR's and Anticoagulation Dosing History:  Anticoagulation Dose History     Recent Dosing and Labs Latest Ref Rng & Units 11/29/2005 11/30/2005 12/1/2005 12/2/2005 12/6/2011 3/2/2015 1/10/2022    INR 0.85 - 1.15 1.00 1.31(H) 1.52(H) 1.96(H) 1.17(H) 0.97 1.10        Most Recent 3 Troponin's:  Recent Labs   Lab Test 07/11/21  1359   TROPONIN <0.015     Most Recent Cholesterol Panel:No lab results found.  Most Recent 6 Bacteria Isolates From Any Culture (See EPIC Reports for Culture Details):  Recent Labs   Lab Test 06/15/19  0947 06/23/17  2329 07/10/16  1055 09/02/15  1658 03/02/15  1145 02/26/15  1127   CULT >100,000 colonies/mL  Citrobacter freundii  complex  *  10,000 to 50,000 colonies/mL  Candida albicans / dubliniensis  Candida albicans and Candida dubliniensis are not routinely speciated  Susceptibility testing not routinely done  * 50,000 to 100,000 colonies/mL Citrobacter freundii complex* No growth >100,000 colonies/mL Klebsiella pneumoniae* No growth >100,000 colonies/mL Klebsiella pneumoniae  >100,000 colonies/mL Staphylococcus aureus  10,000 to 50,000 colonies/mL Strain 2 Klebsiella pneumoniae  *     Most Recent TSH, T4 and A1c Labs:  Recent Labs   Lab Test 01/10/22  1820   A1C 7.7*

## 2022-01-17 NOTE — PROGRESS NOTES
Care Management Discharge Note    Discharge Date: 01/17/2022       Discharge Disposition: Hospice,Assisted Living    Discharge Services: None    Discharge DME: None    Discharge Transportation: agency    Private pay costs discussed: Not applicable    PAS Confirmation Code:    Patient/family educated on Medicare website which has current facility and service quality ratings: no (spouse aware of medicare ratings)    Education Provided on the Discharge Plan:    Persons Notified of Discharge Plans: care team/spouse  Patient/Family in Agreement with the Plan: yes    Handoff Referral Completed: Yes    Additional Information:    Pt is scheduled to discharge today at 1300 via HE stretcher transport with [facility].  Lian confirmed with [facility] that they are ready for pt and a nurse will be arriving to home today at 1400.  Lian confirmed with spouse that pt will discharge today; spouse is aware.  Spouse stated that they have a hill that can be difficult for vans to get up; they do have a ramp that can be used to get into the home through the garage.  Lian updated [facility] transport team.  DME hospital bed has arrived to home.  Lian faxed discharge orders to [facility].          YANDEL Lakhani

## 2022-01-17 NOTE — PLAN OF CARE
0934-1503: Pt A/O, d/o to situation. All assessments and VS deferred, comfort cares. Pt denies pain, nausea. A2, w/ lift, T/R when in bed. Regular diet. Sequeira catheter in place w/ good UOP. Excoriated groin. Wife at bedside. Plan for discharge to home w/ hospice 1/17, at 1300 via stretcher.

## 2022-01-17 NOTE — PLAN OF CARE
1900: 0700: Comfort cares. Pt opens eyes with repositioning, much more alert, asking for sips of water and pop in refrigerator . VS and assessments deferred. Very [place]. T/R Q2HR. Sequeira in place, excoriation noted to groin. Cpap while sleeping. Discharge home w/ hospice today at 1300 via stretcher.

## 2022-01-17 NOTE — PLAN OF CARE
Discharge Note    Patient discharged to home via EMS/BLS accompanied by significant other .  IV: Discontinued  Prescriptions printed and given to patient/family.   Belongings reviewed and sent with family.   Home medications returned to patient: NA  Equipment sent with: N/A.   family verbalizes understanding of discharge instructions. AVS given to family and faxed to [facility] .